# Patient Record
Sex: MALE | Race: WHITE | Employment: OTHER
[De-identification: names, ages, dates, MRNs, and addresses within clinical notes are randomized per-mention and may not be internally consistent; named-entity substitution may affect disease eponyms.]

---

## 2017-01-18 RX ORDER — MELOXICAM 15 MG/1
TABLET ORAL
Qty: 90 TABLET | Refills: 3 | Status: SHIPPED | OUTPATIENT
Start: 2017-01-18 | End: 2018-01-24 | Stop reason: SDUPTHER

## 2017-02-02 ENCOUNTER — SURGERY (OUTPATIENT)
Age: 82
End: 2017-02-02

## 2017-02-02 ENCOUNTER — ANESTHESIA (OUTPATIENT)
Dept: ENDOSCOPY | Age: 82
End: 2017-02-02
Payer: MEDICARE

## 2017-02-02 ENCOUNTER — ANESTHESIA EVENT (OUTPATIENT)
Dept: ENDOSCOPY | Age: 82
End: 2017-02-02
Payer: MEDICARE

## 2017-02-02 VITALS
SYSTOLIC BLOOD PRESSURE: 116 MMHG | DIASTOLIC BLOOD PRESSURE: 67 MMHG | OXYGEN SATURATION: 99 % | RESPIRATION RATE: 18 BRPM

## 2017-02-02 PROCEDURE — 6360000002 HC RX W HCPCS: Performed by: NURSE ANESTHETIST, CERTIFIED REGISTERED

## 2017-02-02 PROCEDURE — 2500000003 HC RX 250 WO HCPCS: Performed by: NURSE ANESTHETIST, CERTIFIED REGISTERED

## 2017-02-02 RX ORDER — PROPOFOL 10 MG/ML
INJECTION, EMULSION INTRAVENOUS PRN
Status: DISCONTINUED | OUTPATIENT
Start: 2017-02-02 | End: 2017-02-02 | Stop reason: SDUPTHER

## 2017-02-02 RX ORDER — LIDOCAINE HYDROCHLORIDE 10 MG/ML
INJECTION, SOLUTION EPIDURAL; INFILTRATION; INTRACAUDAL; PERINEURAL PRN
Status: DISCONTINUED | OUTPATIENT
Start: 2017-02-02 | End: 2017-02-02 | Stop reason: SDUPTHER

## 2017-02-02 RX ADMIN — PROPOFOL 220 MG: 10 INJECTION, EMULSION INTRAVENOUS at 08:18

## 2017-02-02 RX ADMIN — LIDOCAINE HYDROCHLORIDE 5 ML: 10 INJECTION, SOLUTION EPIDURAL; INFILTRATION; INTRACAUDAL; PERINEURAL at 08:18

## 2017-02-20 RX ORDER — SIMVASTATIN 10 MG
TABLET ORAL
Qty: 90 TABLET | Refills: 0 | Status: SHIPPED | OUTPATIENT
Start: 2017-02-20 | End: 2017-05-27 | Stop reason: SDUPTHER

## 2017-03-02 RX ORDER — LISINOPRIL 10 MG/1
TABLET ORAL
Qty: 30 TABLET | Refills: 5 | Status: SHIPPED | OUTPATIENT
Start: 2017-03-02 | End: 2017-08-31 | Stop reason: SDUPTHER

## 2017-04-03 RX ORDER — CARVEDILOL 6.25 MG/1
TABLET ORAL
Qty: 180 TABLET | Refills: 3 | Status: SHIPPED | OUTPATIENT
Start: 2017-04-03 | End: 2018-04-17 | Stop reason: SDUPTHER

## 2017-04-04 ENCOUNTER — OFFICE VISIT (OUTPATIENT)
Dept: FAMILY MEDICINE CLINIC | Age: 82
End: 2017-04-04
Payer: MEDICARE

## 2017-04-04 ENCOUNTER — HOSPITAL ENCOUNTER (OUTPATIENT)
Dept: GENERAL RADIOLOGY | Age: 82
Discharge: HOME | End: 2017-04-04
Payer: MEDICARE

## 2017-04-04 VITALS
WEIGHT: 200 LBS | DIASTOLIC BLOOD PRESSURE: 78 MMHG | SYSTOLIC BLOOD PRESSURE: 140 MMHG | HEART RATE: 86 BPM | BODY MASS INDEX: 25.68 KG/M2 | TEMPERATURE: 98 F | OXYGEN SATURATION: 96 %

## 2017-04-04 DIAGNOSIS — R27.0 ATAXIA: ICD-10-CM

## 2017-04-04 DIAGNOSIS — R90.89 ABNORMAL BRAIN CT: ICD-10-CM

## 2017-04-04 DIAGNOSIS — G31.9 BRAIN ATROPHY (HCC): ICD-10-CM

## 2017-04-04 DIAGNOSIS — Z96.0 HISTORY OF PENILE IMPLANT: ICD-10-CM

## 2017-04-04 DIAGNOSIS — R53.1 WEAKNESS: ICD-10-CM

## 2017-04-04 DIAGNOSIS — G93.89: ICD-10-CM

## 2017-04-04 DIAGNOSIS — F32.89 OTHER DEPRESSION: ICD-10-CM

## 2017-04-04 DIAGNOSIS — I10 ESSENTIAL HYPERTENSION: ICD-10-CM

## 2017-04-04 DIAGNOSIS — E78.2 MIXED HYPERLIPIDEMIA: ICD-10-CM

## 2017-04-04 DIAGNOSIS — J01.20 SUBACUTE ETHMOIDAL SINUSITIS: ICD-10-CM

## 2017-04-04 DIAGNOSIS — N42.9 DISORDER OF PROSTATE: ICD-10-CM

## 2017-04-04 PROCEDURE — 70450 CT HEAD/BRAIN W/O DYE: CPT

## 2017-04-04 PROCEDURE — 93880 EXTRACRANIAL BILAT STUDY: CPT

## 2017-04-04 PROCEDURE — 99214 OFFICE O/P EST MOD 30 MIN: CPT | Performed by: FAMILY MEDICINE

## 2017-04-04 ASSESSMENT — ENCOUNTER SYMPTOMS
CONSTIPATION: 0
ALLERGIC/IMMUNOLOGIC NEGATIVE: 1
BLOOD IN STOOL: 0
VOMITING: 0
EYES NEGATIVE: 1
SHORTNESS OF BREATH: 0
DIARRHEA: 0
CHEST TIGHTNESS: 0
NAUSEA: 0
WHEEZING: 0

## 2017-04-05 ENCOUNTER — TELEPHONE (OUTPATIENT)
Dept: FAMILY MEDICINE CLINIC | Age: 82
End: 2017-04-05

## 2017-04-10 DIAGNOSIS — R26.81 UNSTEADY GAIT: Primary | ICD-10-CM

## 2017-04-20 ENCOUNTER — TELEPHONE (OUTPATIENT)
Dept: FAMILY MEDICINE CLINIC | Age: 82
End: 2017-04-20

## 2017-05-30 RX ORDER — SIMVASTATIN 10 MG
TABLET ORAL
Qty: 90 TABLET | Refills: 3 | Status: SHIPPED | OUTPATIENT
Start: 2017-05-30 | End: 2018-05-30 | Stop reason: SDUPTHER

## 2017-07-05 RX ORDER — SERTRALINE HYDROCHLORIDE 100 MG/1
TABLET, FILM COATED ORAL
Qty: 90 TABLET | Refills: 0 | Status: SHIPPED | OUTPATIENT
Start: 2017-07-05 | End: 2017-10-06 | Stop reason: SDUPTHER

## 2017-07-10 ENCOUNTER — OFFICE VISIT (OUTPATIENT)
Dept: FAMILY MEDICINE CLINIC | Age: 82
End: 2017-07-10
Payer: MEDICARE

## 2017-07-10 VITALS
HEIGHT: 74 IN | SYSTOLIC BLOOD PRESSURE: 138 MMHG | DIASTOLIC BLOOD PRESSURE: 84 MMHG | RESPIRATION RATE: 14 BRPM | WEIGHT: 201 LBS | OXYGEN SATURATION: 98 % | BODY MASS INDEX: 25.8 KG/M2 | TEMPERATURE: 99.1 F | HEART RATE: 62 BPM

## 2017-07-10 DIAGNOSIS — G60.9 IDIOPATHIC PERIPHERAL NEUROPATHY: ICD-10-CM

## 2017-07-10 DIAGNOSIS — I25.10 CORONARY ARTERY DISEASE INVOLVING NATIVE CORONARY ARTERY OF NATIVE HEART WITHOUT ANGINA PECTORIS: ICD-10-CM

## 2017-07-10 DIAGNOSIS — G31.9 BRAIN ATROPHY (HCC): ICD-10-CM

## 2017-07-10 DIAGNOSIS — I10 ESSENTIAL HYPERTENSION: ICD-10-CM

## 2017-07-10 DIAGNOSIS — I48.0 PAROXYSMAL ATRIAL FIBRILLATION (HCC): ICD-10-CM

## 2017-07-10 DIAGNOSIS — R27.0 ATAXIA: ICD-10-CM

## 2017-07-10 DIAGNOSIS — R90.89 ABNORMAL BRAIN CT: ICD-10-CM

## 2017-07-10 DIAGNOSIS — E78.5 HYPERLIPIDEMIA, UNSPECIFIED HYPERLIPIDEMIA TYPE: ICD-10-CM

## 2017-07-10 DIAGNOSIS — G47.10 HYPERSOMNIA: ICD-10-CM

## 2017-07-10 PROCEDURE — 99214 OFFICE O/P EST MOD 30 MIN: CPT | Performed by: FAMILY MEDICINE

## 2017-07-10 ASSESSMENT — ENCOUNTER SYMPTOMS
BLOOD IN STOOL: 0
EYES NEGATIVE: 1
ALLERGIC/IMMUNOLOGIC NEGATIVE: 1
VOMITING: 0
CONSTIPATION: 0
NAUSEA: 0
SHORTNESS OF BREATH: 0
DIARRHEA: 0
WHEEZING: 0
CHEST TIGHTNESS: 0

## 2017-07-11 ENCOUNTER — HOSPITAL ENCOUNTER (OUTPATIENT)
Dept: GENERAL RADIOLOGY | Age: 82
Discharge: HOME | End: 2017-07-11
Payer: MEDICARE

## 2017-07-11 DIAGNOSIS — G31.9 BRAIN ATROPHY (HCC): ICD-10-CM

## 2017-07-11 DIAGNOSIS — R27.0 ATAXIA: ICD-10-CM

## 2017-07-11 DIAGNOSIS — R90.89 ABNORMAL BRAIN CT: ICD-10-CM

## 2017-07-11 DIAGNOSIS — G60.9 IDIOPATHIC PERIPHERAL NEUROPATHY: ICD-10-CM

## 2017-07-11 PROCEDURE — 70450 CT HEAD/BRAIN W/O DYE: CPT

## 2017-07-13 ENCOUNTER — TELEPHONE (OUTPATIENT)
Dept: FAMILY MEDICINE CLINIC | Age: 82
End: 2017-07-13

## 2017-08-07 DIAGNOSIS — R53.1 WEAKNESS: ICD-10-CM

## 2017-09-01 RX ORDER — LISINOPRIL 10 MG/1
TABLET ORAL
Qty: 30 TABLET | Refills: 5 | Status: SHIPPED | OUTPATIENT
Start: 2017-09-01 | End: 2018-03-15 | Stop reason: SDUPTHER

## 2017-10-07 RX ORDER — SERTRALINE HYDROCHLORIDE 100 MG/1
TABLET, FILM COATED ORAL
Qty: 90 TABLET | Refills: 3 | Status: SHIPPED | OUTPATIENT
Start: 2017-10-07 | End: 2018-01-04 | Stop reason: SDUPTHER

## 2018-01-04 RX ORDER — SERTRALINE HYDROCHLORIDE 100 MG/1
TABLET, FILM COATED ORAL
Qty: 90 TABLET | Refills: 3 | Status: SHIPPED | OUTPATIENT
Start: 2018-01-04 | End: 2018-12-20 | Stop reason: SDUPTHER

## 2018-01-24 RX ORDER — MELOXICAM 15 MG/1
15 TABLET ORAL DAILY
Qty: 90 TABLET | Refills: 3 | Status: SHIPPED | OUTPATIENT
Start: 2018-01-24 | End: 2018-12-20 | Stop reason: SDUPTHER

## 2018-02-28 ENCOUNTER — OFFICE VISIT (OUTPATIENT)
Dept: FAMILY MEDICINE CLINIC | Age: 83
End: 2018-02-28
Payer: MEDICARE

## 2018-02-28 VITALS
TEMPERATURE: 97.9 F | DIASTOLIC BLOOD PRESSURE: 72 MMHG | WEIGHT: 202.5 LBS | SYSTOLIC BLOOD PRESSURE: 130 MMHG | BODY MASS INDEX: 26 KG/M2 | OXYGEN SATURATION: 96 % | HEART RATE: 71 BPM

## 2018-02-28 DIAGNOSIS — I10 ESSENTIAL HYPERTENSION: ICD-10-CM

## 2018-02-28 DIAGNOSIS — R26.89 LOSS OF BALANCE: ICD-10-CM

## 2018-02-28 DIAGNOSIS — I48.0 PAROXYSMAL ATRIAL FIBRILLATION (HCC): ICD-10-CM

## 2018-02-28 DIAGNOSIS — I25.10 CORONARY ARTERY DISEASE INVOLVING NATIVE CORONARY ARTERY OF NATIVE HEART WITHOUT ANGINA PECTORIS: ICD-10-CM

## 2018-02-28 DIAGNOSIS — E78.5 HYPERLIPIDEMIA, UNSPECIFIED HYPERLIPIDEMIA TYPE: ICD-10-CM

## 2018-02-28 DIAGNOSIS — R27.0 ATAXIA: ICD-10-CM

## 2018-02-28 PROCEDURE — 99214 OFFICE O/P EST MOD 30 MIN: CPT | Performed by: FAMILY MEDICINE

## 2018-02-28 RX ORDER — MECLIZINE HYDROCHLORIDE 25 MG/1
TABLET ORAL
Qty: 120 TABLET | Refills: 3 | Status: SHIPPED | OUTPATIENT
Start: 2018-02-28 | End: 2019-04-29 | Stop reason: ALTCHOICE

## 2018-02-28 ASSESSMENT — ENCOUNTER SYMPTOMS
BLOOD IN STOOL: 0
COUGH: 0
ALLERGIC/IMMUNOLOGIC NEGATIVE: 1
VOMITING: 0
DIARRHEA: 0
EYES NEGATIVE: 1
CHEST TIGHTNESS: 0
SHORTNESS OF BREATH: 0
NAUSEA: 0
CONSTIPATION: 0
RESPIRATORY NEGATIVE: 1
WHEEZING: 0

## 2018-02-28 NOTE — PROGRESS NOTES
referred to specialty care physician at this time. We therefore going to give him a trial of antevert 25 and have him use one half in the morning one half around noon, one half in the late afternoon and 1 whole pill at bedtime. We will see if this will help his ataxia somewhat. Review of Systems   Constitutional: Positive for fatigue. HENT: Negative. Eyes: Negative. Respiratory: Negative. Negative for cough, chest tightness, shortness of breath and wheezing. Cardiovascular: Negative for chest pain, palpitations and leg swelling. Gastrointestinal: Negative for blood in stool, constipation, diarrhea, nausea and vomiting. Endocrine: Negative. Genitourinary: Negative. Negative for hematuria. Musculoskeletal: Negative. Skin: Negative. Allergic/Immunologic: Negative. Neurological: Positive for weakness. Hematological: Negative. Psychiatric/Behavioral: Negative. Objective:   Physical Exam   Constitutional: He is oriented to person, place, and time. He appears well-developed and well-nourished. HENT:   Head: Normocephalic and atraumatic. Right Ear: External ear normal.   Left Ear: External ear normal.   Nose: Nose normal.   Mouth/Throat: Oropharynx is clear and moist.   Eyes: Conjunctivae and EOM are normal. Pupils are equal, round, and reactive to light. Neck: Normal range of motion. Neck supple. Cardiovascular: Normal rate, regular rhythm, S1 normal, S2 normal, normal heart sounds, intact distal pulses and normal pulses. Pulmonary/Chest: Effort normal and breath sounds normal. No apnea. Abdominal: Soft. Normal appearance. Musculoskeletal: Normal range of motion. Neurological: He is alert and oriented to person, place, and time. He has normal strength and normal reflexes. Skin: Skin is warm, dry and intact. Psychiatric: He has a normal mood and affect.  His speech is normal and behavior is normal. Judgment and thought content normal. Cognition and memory are

## 2018-03-15 RX ORDER — LISINOPRIL 10 MG/1
TABLET ORAL
Qty: 30 TABLET | Refills: 5 | Status: SHIPPED | OUTPATIENT
Start: 2018-03-15 | End: 2018-10-09 | Stop reason: SDUPTHER

## 2018-04-18 RX ORDER — CARVEDILOL 6.25 MG/1
TABLET ORAL
Qty: 180 TABLET | Refills: 3 | Status: SHIPPED | OUTPATIENT
Start: 2018-04-18 | End: 2019-03-18 | Stop reason: SDUPTHER

## 2018-04-18 RX ORDER — CARVEDILOL 6.25 MG/1
TABLET ORAL
Qty: 180 TABLET | Refills: 0 | Status: SHIPPED | OUTPATIENT
Start: 2018-04-18 | End: 2018-04-23 | Stop reason: SDUPTHER

## 2018-04-23 ENCOUNTER — OFFICE VISIT (OUTPATIENT)
Dept: FAMILY MEDICINE CLINIC | Age: 83
End: 2018-04-23
Payer: MEDICARE

## 2018-04-23 ENCOUNTER — HOSPITAL ENCOUNTER (OUTPATIENT)
Dept: GENERAL RADIOLOGY | Age: 83
Discharge: HOME | End: 2018-04-23
Payer: MEDICARE

## 2018-04-23 VITALS
TEMPERATURE: 98.5 F | HEART RATE: 65 BPM | SYSTOLIC BLOOD PRESSURE: 132 MMHG | DIASTOLIC BLOOD PRESSURE: 76 MMHG | HEIGHT: 74 IN | RESPIRATION RATE: 16 BRPM | OXYGEN SATURATION: 97 % | BODY MASS INDEX: 25.8 KG/M2 | WEIGHT: 201 LBS

## 2018-04-23 DIAGNOSIS — R53.1 WEAKNESS: ICD-10-CM

## 2018-04-23 DIAGNOSIS — R41.3 MEMORY LOSS: ICD-10-CM

## 2018-04-23 DIAGNOSIS — R27.0 ATAXIA: ICD-10-CM

## 2018-04-23 DIAGNOSIS — I48.0 PAROXYSMAL ATRIAL FIBRILLATION (HCC): ICD-10-CM

## 2018-04-23 DIAGNOSIS — I25.10 CORONARY ARTERY DISEASE INVOLVING NATIVE CORONARY ARTERY OF NATIVE HEART WITHOUT ANGINA PECTORIS: ICD-10-CM
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
## 2018-04-23 DIAGNOSIS — I10 ESSENTIAL HYPERTENSION: ICD-10-CM

## 2018-04-23 DIAGNOSIS — E78.5 HYPERLIPIDEMIA, UNSPECIFIED HYPERLIPIDEMIA TYPE: ICD-10-CM

## 2018-04-23 DIAGNOSIS — G31.9 BRAIN ATROPHY (HCC): ICD-10-CM

## 2018-04-23 PROCEDURE — 70450 CT HEAD/BRAIN W/O DYE: CPT

## 2018-04-23 PROCEDURE — 99214 OFFICE O/P EST MOD 30 MIN: CPT | Performed by: FAMILY MEDICINE

## 2018-04-23 ASSESSMENT — ENCOUNTER SYMPTOMS
VOMITING: 0
NAUSEA: 0
DIARRHEA: 0
BLOOD IN STOOL: 0
EYES NEGATIVE: 1
CHEST TIGHTNESS: 0
WHEEZING: 0
SHORTNESS OF BREATH: 0
CONSTIPATION: 0
COUGH: 0

## 2018-04-26 ENCOUNTER — OFFICE VISIT (OUTPATIENT)
Dept: NEUROSURGERY | Facility: CLINIC | Age: 83
End: 2018-04-26

## 2018-04-26 VITALS
HEIGHT: 74 IN | BODY MASS INDEX: 25.8 KG/M2 | DIASTOLIC BLOOD PRESSURE: 94 MMHG | WEIGHT: 201 LBS | SYSTOLIC BLOOD PRESSURE: 142 MMHG

## 2018-04-26 DIAGNOSIS — R41.3 MEMORY DEFICIT: ICD-10-CM

## 2018-04-26 DIAGNOSIS — R26.89 BALANCE PROBLEM: Primary | ICD-10-CM

## 2018-04-26 DIAGNOSIS — Z78.9 NONSMOKER: ICD-10-CM

## 2018-04-26 PROCEDURE — 99204 OFFICE O/P NEW MOD 45 MIN: CPT | Performed by: NURSE PRACTITIONER

## 2018-04-26 RX ORDER — NITROGLYCERIN 0.4 MG/1
0.4 TABLET SUBLINGUAL
COMMUNITY
Start: 2018-03-12 | End: 2019-08-27 | Stop reason: SDUPTHER

## 2018-04-26 NOTE — PROGRESS NOTES
"Neurosurgery Initial Patient Visit    Patient: Ayan Pichardo  : 1932    Primary Care Provider: Carlyle Coker MD    Requesting Provider:  Carlyle Coker MD      History    Chief Complaint:   Chief Complaint   Patient presents with   • Balance Issues     Pt states that \"he just knows he has hydrocephalitis\".  The patient states that he has issues with balance       History of Present Illness: He is an 85-year-old  male who presents with chief complaint of balance, neck pain and popping with movement.  He later states he is also having issues with his memory.  He has been referred by Dr. Coker, and we have been asked to see him in consultation for further evaluation of possible hydrocephalus.    The patient and his wife give history that he has actually been having some issues with his memory that have been noticeable, for about the past year.  For the last 4-6 weeks he has had terrible problems with his bowel and and has had several falls as a result.  It is difficult for him to describe how he feels when he loses his balance.  He does not particularly feel that he is not lightheaded or actually dizzy.  He does not relate any associated headache.  He is not describing any joint pain or radicular pain into the lower extremities, particularly no numbness.  He has been using a cane to help him feel more stable.  He has had a recent CT of the head done on the .  We also had imaging available from [facility] with additional CTs done in April and 2017.  While there is some slight ventriculomegaly, this appears to be stable and perhaps is seen in the setting of cerebral atrophy.  No other obvious acute neurologic findings have been seen.  The patient has been doing extensive research and is quite certain that he has hydrocephalus which his sister also hands.  She is now in her 90s and he describes that she is treated with repeated lumbar punctures for CSF removal.    Again, he has noted no " headache but is aware that he is having issues with his memory.  He has difficulty with short-term memory and daily activities.  He has to rely on his wife for assistance in remembering much of anything.  He has difficulty with his balance, which affects his gait and ambulation, but it does not appear to be broad-based.  He does not relate any difficulty with urinary control or incontinence.    Allergies:   Review of patient's allergies indicates no known allergies.    Past Medical History:    Past Medical History:   Diagnosis Date   • Atrial fibrillation    • Back pain    • Brain atrophy    • CAD (coronary artery disease)    • Constipation    • Hyperlipidemia    • Hypersomnia    • Hypertension    • Memory deficit    • Neuropathy    • Radiculopathy        Past Surgical History:   Past Surgical History:   Procedure Laterality Date   • AV NODE ABLATION     • PENILE PROSTHESIS IMPLANT         Medications:    Current Outpatient Prescriptions on File Prior to Visit   Medication Sig Dispense Refill   • aspirin 81 MG EC tablet Take 81 mg by mouth Daily.     • carvedilol (COREG) 6.25 MG tablet Take 6.25 mg by mouth 2 (Two) Times a Day With Meals.     • lisinopril (PRINIVIL,ZESTRIL) 10 MG tablet Take 10 mg by mouth Daily.     • meloxicam (MOBIC) 15 MG tablet Take 15 mg by mouth Daily.     • sertraline (ZOLOFT) 100 MG tablet Take 100 mg by mouth Daily.     • simvastatin (ZOCOR) 10 MG tablet Take 10 mg by mouth Every Night.     • [DISCONTINUED] dutasteride-tamsulosin (NADIR) 0.5-0.4 MG capsule capsule Take  by mouth Daily.     • [DISCONTINUED] linaclotide (LINZESS) 290 MCG capsule capsule Take 290 mcg by mouth Every Morning Before Breakfast.       No current facility-administered medications on file prior to visit.         Social History:   reports that he has never smoked. He has never used smokeless tobacco. He reports that he does not drink alcohol or use drugs.  He is  and his wife accompanies him.  They live here  in [place]   He is retired as a  with the [employer].    Family History:    Family History   Problem Relation Age of Onset   • Cancer Mother    • Hypertension Mother        Review of Systems:  Review of Systems   Constitutional: Positive for fatigue. Negative for chills, fever and unexpected weight change.   HENT: Positive for tinnitus. Negative for congestion, hearing loss, rhinorrhea and sore throat.    Eyes: Negative for photophobia and visual disturbance.   Respiratory: Negative for cough, shortness of breath and wheezing.    Cardiovascular: Negative for chest pain and palpitations.   Gastrointestinal: Negative for constipation, diarrhea, nausea and vomiting.   Genitourinary: Negative for difficulty urinating.   Musculoskeletal: Positive for gait problem, neck pain and neck stiffness. Negative for arthralgias and back pain.   Skin: Negative for rash.   Allergic/Immunologic: Negative for environmental allergies.   Neurological: Positive for dizziness. Negative for seizures, numbness and headaches.   Hematological: Does not bruise/bleed easily.   Psychiatric/Behavioral: Negative for confusion. The patient is not nervous/anxious.    All other systems reviewed and are negative.          Neurological Physical Examination    Physical Exam   Constitutional: He is oriented to person, place, and time. He appears well-developed and well-nourished. No distress.   He is pleasant and cooperative in no acute distress.  He exhibits short-term memory deficit and asks questions repeatedly.   HENT:   Head: Normocephalic and atraumatic.   Eyes: No scleral icterus.   Neck: Neck supple. No tracheal deviation and normal range of motion present.   Cardiovascular: Normal rate, regular rhythm and normal heart sounds.    No murmur heard.  Pulmonary/Chest: Effort normal and breath sounds normal. No respiratory distress. He has no wheezes.   Respirations even and unlabored with no distress.   Musculoskeletal:   There is good  symmetric strength of both upper and lower extremities.  I do not detect a true focal weakness.  He ambulates with steady gait, unassisted for short distances.  His gait does not appear broad-based, nor is it shuffling.   Neurological: He is alert and oriented to person, place, and time. He displays normal reflexes. No cranial nerve deficit.   Optic discs not visualized.  Cranial nerves II through XII appear intact without obvious, acute neurologic deficit.  He is able to obey and follow commands very well without particular difficulty.   Skin: Skin is warm and dry. No rash noted.   Psychiatric: He has a normal mood and affect. His speech is normal and behavior is normal. Cognition and memory are normal.   Vitals reviewed.         Medical Decision Making    Management Options:  In the office we have discussed his care.  I have reviewed multiple CT images on disc from [facility].  It is difficult for me to determine that there has been any significant change in ventricular size over the past year.  I have discussed this with them.    The patient and his wife are well known to our practice.  I spent quite a bit of time with them in the past, and she has been a patient previously.  In many ways they are much as I remember them, although I do note a particular difference in the patient's demeanor and obviously he is having significant difficulties with memory.  His overall presentation to me is not clearly that of hydrocephalus.  I believe there is at least an underlying issue of dementia.  I believe he has been prescribed meclizine but has not yet been able to get the medication and begin using it.  We have talked about physical therapy to evaluate and assist with strengthening, gait and ambulation, and he is very agreeable.  We have also talked about referral to neurology, and they are also agreeable.  Would like to see how he responds to therapy, hoping that this will help to improve his balance and gait.  We would then  see him back with Dr. Rodriguez in about a month.  Hopefully we will also have neurology's thoughts and recommendations.  If he does not improve, then perhaps consider large volume CSF removal and possible MRI of the brain for further evaluation.  Again, they are agreeable with our plan of care.    His imaging is here in the office on file for further review as needed.    Ayan was seen today for balance issues.    Diagnoses and all orders for this visit:    Balance problem  -     Ambulatory Referral to Neurology  -     Ambulatory Referral to Physical Therapy Evaluate and treat (3x a week for 4 weeks), Neuro    Memory deficit  -     Ambulatory Referral to Neurology  -     Ambulatory Referral to Physical Therapy Evaluate and treat (3x a week for 4 weeks), Neuro    BMI 25.0-25.9,adult    Nonsmoker        Thank you, Dr. Coker, for this Consultation and the opportunity to participate in the care of this pleasant patient.

## 2018-05-03 ENCOUNTER — TELEPHONE (OUTPATIENT)
Dept: FAMILY MEDICINE CLINIC | Age: 83
End: 2018-05-03

## 2018-05-09 ENCOUNTER — APPOINTMENT (OUTPATIENT)
Dept: PHYSICAL THERAPY | Facility: HOSPITAL | Age: 83
End: 2018-05-09

## 2018-05-30 RX ORDER — SIMVASTATIN 10 MG
TABLET ORAL
Qty: 90 TABLET | Refills: 1 | Status: SHIPPED | OUTPATIENT
Start: 2018-05-30 | End: 2018-10-09 | Stop reason: SDUPTHER

## 2018-08-10 ENCOUNTER — APPOINTMENT (OUTPATIENT)
Dept: GENERAL RADIOLOGY | Facility: HOSPITAL | Age: 83
End: 2018-08-10

## 2018-08-10 ENCOUNTER — HOSPITAL ENCOUNTER (EMERGENCY)
Facility: HOSPITAL | Age: 83
Discharge: HOME | End: 2018-08-10
Attending: EMERGENCY MEDICINE | Admitting: EMERGENCY MEDICINE

## 2018-08-10 VITALS
RESPIRATION RATE: 16 BRPM | BODY MASS INDEX: 23.1 KG/M2 | SYSTOLIC BLOOD PRESSURE: 160 MMHG | TEMPERATURE: 98 F | HEIGHT: 74 IN | WEIGHT: 180 LBS | DIASTOLIC BLOOD PRESSURE: 90 MMHG | OXYGEN SATURATION: 97 % | HEART RATE: 86 BPM

## 2018-08-10 DIAGNOSIS — T75.1XXA NEAR DROWNING, INITIAL ENCOUNTER: Primary | ICD-10-CM

## 2018-08-10 PROCEDURE — 93005 ELECTROCARDIOGRAM TRACING: CPT

## 2018-08-10 PROCEDURE — 93005 ELECTROCARDIOGRAM TRACING: CPT | Performed by: EMERGENCY MEDICINE

## 2018-08-10 PROCEDURE — 71046 X-RAY EXAM CHEST 2 VIEWS: CPT

## 2018-08-10 PROCEDURE — 99284 EMERGENCY DEPT VISIT MOD MDM: CPT

## 2018-08-10 PROCEDURE — 93010 ELECTROCARDIOGRAM REPORT: CPT | Performed by: INTERNAL MEDICINE
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
## 2018-08-22 ENCOUNTER — OFFICE VISIT (OUTPATIENT)
Dept: OTOLARYNGOLOGY | Facility: CLINIC | Age: 83
End: 2018-08-22

## 2018-08-22 VITALS
DIASTOLIC BLOOD PRESSURE: 80 MMHG | TEMPERATURE: 98 F | SYSTOLIC BLOOD PRESSURE: 141 MMHG | WEIGHT: 189 LBS | HEART RATE: 83 BPM | BODY MASS INDEX: 24.26 KG/M2 | HEIGHT: 74 IN

## 2018-08-22 DIAGNOSIS — C43.9 RECURRENT MALIGNANT MELANOMA OF SKIN (HCC): Primary | ICD-10-CM

## 2018-08-22 PROCEDURE — 99203 OFFICE O/P NEW LOW 30 MIN: CPT | Performed by: OTOLARYNGOLOGY

## 2018-08-22 RX ORDER — LIDOCAINE AND PRILOCAINE 25; 25 MG/G; MG/G
CREAM TOPICAL
Qty: 1 EACH | Refills: 0 | Status: SHIPPED | OUTPATIENT
Start: 2018-08-22 | End: 2018-09-05

## 2018-08-22 RX ORDER — CLOPIDOGREL BISULFATE 75 MG
TABLET ORAL
COMMUNITY
End: 2018-09-05

## 2018-08-22 NOTE — PROGRESS NOTES
CC:   Chief Complaint   Patient presents with   • Skin Lesion     SCALP MELANOMA       HPI: Ayan Pichardo is a 86 y.o. male who reports a skin lesion on the scalp.  This lesion has been present for 6 months.  The lesion has changed. He reports the lesion came up as a knot with a central black spot.  Nothing makes the lesion better or worse.  An excisional biopsy has been performed by Dr. Galarza on 7/16/18 revealing malignant melanoma 2.5 mm in depth with adjacent extensive mature scar. No ulceration with 5/mm2 mitoses.  He had a melanoma removed from this same area 10 years ago in [place] - not sure how it was reconstructed.    Prior history of skin cancer: prior history of malignant melanoma of the scalp removed approximately 10 years ago by Dr. Paula in [place]. His wife is unsure of the pathology.    Dermatologist: Rahat Galarza MD    He is taking ASA and Plavix for history of Afib requiring ablation x 2 by Dr. Alvares at [facility] in [place].     PFSH:  Past Medical History:   Diagnosis Date   • Atrial fibrillation (CMS/HCC)    • Back pain    • Brain atrophy    • CAD (coronary artery disease)    • Constipation    • Hyperlipidemia    • Hypersomnia    • Hypertension    • Melanoma (CMS/HCC)     SCALP   • Memory deficit    • Neuropathy    • Radiculopathy      Past Surgical History:   Procedure Laterality Date   • AV NODE ABLATION     • PENILE PROSTHESIS IMPLANT       Family History   Problem Relation Age of Onset   • Cancer Mother    • Hypertension Mother      Social History   Substance Use Topics   • Smoking status: Never Smoker   • Smokeless tobacco: Never Used   • Alcohol use No     Allergies:  Patient has no known allergies.    Current Outpatient Prescriptions:   •  aspirin 81 MG EC tablet, Take 81 mg by mouth Daily., Disp: , Rfl:   •  carvedilol (COREG) 6.25 MG tablet, Take 6.25 mg by mouth 2 (Two) Times a Day With Meals., Disp: , Rfl:   •  clopidogrel (PLAVIX) 75 MG tablet, Plavix 75 mg tablet  Take 1  "tablet every day by oral route., Disp: , Rfl:   •  lisinopril (PRINIVIL,ZESTRIL) 10 MG tablet, Take 10 mg by mouth Daily., Disp: , Rfl:   •  meloxicam (MOBIC) 15 MG tablet, Take 15 mg by mouth Daily., Disp: , Rfl:   •  nitroglycerin (NITROSTAT) 0.4 MG SL tablet, , Disp: , Rfl:   •  sertraline (ZOLOFT) 100 MG tablet, Take 100 mg by mouth Daily., Disp: , Rfl:   •  simvastatin (ZOCOR) 10 MG tablet, Take 10 mg by mouth Every Night., Disp: , Rfl:     ROS:  Review of Systems   Constitutional: Negative for activity change, appetite change, chills, fatigue, fever and unexpected weight change.   HENT: Negative for congestion, dental problem, facial swelling and nosebleeds.    Eyes: Negative for discharge and redness.   Skin: Negative for color change, pallor and rash.   Hematological: Negative for adenopathy. Does not bruise/bleed easily.       PE:  /80   Pulse 83   Temp 98 °F (36.7 °C)   Ht 188 cm (74\")   Wt 85.7 kg (189 lb)   BMI 24.27 kg/m²   Physical Exam   Constitutional: He is oriented to person, place, and time. He appears well-developed and well-nourished. He is cooperative. No distress.   HENT:   Head: Normocephalic and atraumatic.   Right Ear: External ear normal.   Left Ear: External ear normal.   Nose: Nose normal. No nasal deformity.   Mouth/Throat: Uvula is midline, oropharynx is clear and moist and mucous membranes are normal.   Eyes: Pupils are equal, round, and reactive to light. Conjunctivae, EOM and lids are normal. Right eye exhibits no discharge. Left eye exhibits no discharge. No scleral icterus.   Neck: Normal range of motion and phonation normal. Neck supple. No tracheal deviation present.   Cardiovascular: Normal rate and regular rhythm.    Pulmonary/Chest: Effort normal. No stridor. No respiratory distress.   Musculoskeletal: Normal range of motion. He exhibits no edema or deformity.   Lymphadenopathy:     He has no cervical adenopathy.   Neurological: He is alert and oriented to person, " place, and time. He has normal strength. No cranial nerve deficit. Coordination normal.   Skin: Skin is warm and dry. No rash noted. He is not diaphoretic. No erythema. No pallor.   Left central scalp (just left of midline) with 3 x 2.5 cm granulating wound    Psychiatric: He has a normal mood and affect. His speech is normal and behavior is normal. Judgment and thought content normal. Cognition and memory are normal.   Nursing note and vitals reviewed.          Data review:      Assessment:  1. Recurrent malignant melanoma of skin (CMS/HCC)        Plan:    1.  I have offered and recommended excision of the malignant melanoma of the scalp with FTSG in the operating room with permanent section analysis. I have also recommended sentinel lymph node biopsy with lymphoscintigraphy. Since this lesion is marcela the left paramedian, will follow left-sided drainage for SLNBx.  2. The risks and benefits of my recommendations, as well as other treatment options were discussed with the patient and wife today.   3. Discussion of skin lesion. Discussed risks, benefits, alternatives, and possible complications of excision of the skin lesion with reconstruction utilizing local tissue rearrangement, full-thickness skin grafting, or local interpolated flaps. Risks include, but are not limited too: bleeding, infection, hematoma, recurrence, need for additional procedures, flap failure, cosmetic deformity. Patient understands risks and would like to proceed with surgery.  4. We will contact Dr. Alvares at [facility] regarding clearance to stop ASA and Plavix prior to the procedure.      Return for 1 week postoperatively.      Eric Silva MD   08/22/2018  10:47 AM

## 2018-08-27 PROBLEM — C43.9: Status: ACTIVE | Noted: 2018-08-27

## 2018-09-02 ENCOUNTER — APPOINTMENT (OUTPATIENT)
Dept: GENERAL RADIOLOGY | Facility: HOSPITAL | Age: 83
End: 2018-09-02

## 2018-09-02 ENCOUNTER — HOSPITAL ENCOUNTER (EMERGENCY)
Facility: HOSPITAL | Age: 83
Discharge: HOME | End: 2018-09-02
Admitting: EMERGENCY MEDICINE

## 2018-09-02 VITALS
WEIGHT: 180 LBS | DIASTOLIC BLOOD PRESSURE: 70 MMHG | OXYGEN SATURATION: 98 % | RESPIRATION RATE: 16 BRPM | HEART RATE: 100 BPM | TEMPERATURE: 97.4 F | BODY MASS INDEX: 23.1 KG/M2 | HEIGHT: 74 IN | SYSTOLIC BLOOD PRESSURE: 134 MMHG

## 2018-09-02 DIAGNOSIS — R07.9 CHEST PAIN, UNSPECIFIED TYPE: Primary | ICD-10-CM

## 2018-09-02 LAB
ALBUMIN SERPL-MCNC: 3.8 G/DL (ref 3.5–5)
ALBUMIN/GLOB SERPL: 1.4 G/DL (ref 1.1–2.5)
ALP SERPL-CCNC: 74 U/L (ref 24–120)
ALT SERPL W P-5'-P-CCNC: 30 U/L (ref 0–54)
ANION GAP SERPL CALCULATED.3IONS-SCNC: 7 MMOL/L (ref 4–13)
APTT PPP: 36 SECONDS (ref 24.1–34.8)
AST SERPL-CCNC: 31 U/L (ref 7–45)
BASOPHILS # BLD AUTO: 0.03 10*3/MM3 (ref 0–0.2)
BASOPHILS NFR BLD AUTO: 0.5 % (ref 0–2)
BILIRUB SERPL-MCNC: 0.6 MG/DL (ref 0.1–1)
BUN BLD-MCNC: 23 MG/DL (ref 5–21)
BUN/CREAT SERPL: 24 (ref 7–25)
CALCIUM SPEC-SCNC: 9.8 MG/DL (ref 8.4–10.4)
CHLORIDE SERPL-SCNC: 103 MMOL/L (ref 98–110)
CO2 SERPL-SCNC: 31 MMOL/L (ref 24–31)
CREAT BLD-MCNC: 0.96 MG/DL (ref 0.5–1.4)
DEPRECATED RDW RBC AUTO: 43.8 FL (ref 40–54)
EOSINOPHIL # BLD AUTO: 0.17 10*3/MM3 (ref 0–0.7)
EOSINOPHIL NFR BLD AUTO: 2.9 % (ref 0–4)
ERYTHROCYTE [DISTWIDTH] IN BLOOD BY AUTOMATED COUNT: 12.2 % (ref 12–15)
GFR SERPL CREATININE-BSD FRML MDRD: 74 ML/MIN/1.73
GLOBULIN UR ELPH-MCNC: 2.7 GM/DL
GLUCOSE BLD-MCNC: 95 MG/DL (ref 70–100)
HCT VFR BLD AUTO: 40.7 % (ref 40–52)
HGB BLD-MCNC: 13.8 G/DL (ref 14–18)
IMM GRANULOCYTES # BLD: 0.02 10*3/MM3 (ref 0–0.03)
IMM GRANULOCYTES NFR BLD: 0.3 % (ref 0–5)
INR PPP: 1.03 (ref 0.91–1.09)
LIPASE SERPL-CCNC: 87 U/L (ref 23–203)
LYMPHOCYTES # BLD AUTO: 1.72 10*3/MM3 (ref 0.72–4.86)
LYMPHOCYTES NFR BLD AUTO: 28.9 % (ref 15–45)
MCH RBC QN AUTO: 32.9 PG (ref 28–32)
MCHC RBC AUTO-ENTMCNC: 33.9 G/DL (ref 33–36)
MCV RBC AUTO: 96.9 FL (ref 82–95)
MONOCYTES # BLD AUTO: 0.48 10*3/MM3 (ref 0.19–1.3)
MONOCYTES NFR BLD AUTO: 8.1 % (ref 4–12)
NEUTROPHILS # BLD AUTO: 3.54 10*3/MM3 (ref 1.87–8.4)
NEUTROPHILS NFR BLD AUTO: 59.3 % (ref 39–78)
NRBC BLD MANUAL-RTO: 0 /100 WBC (ref 0–0)
NT-PROBNP SERPL-MCNC: 326 PG/ML (ref 0–1800)
PLATELET # BLD AUTO: 125 10*3/MM3 (ref 130–400)
PMV BLD AUTO: 10.1 FL (ref 6–12)
POTASSIUM BLD-SCNC: 5.2 MMOL/L (ref 3.5–5.3)
PROT SERPL-MCNC: 6.5 G/DL (ref 6.3–8.7)
PROTHROMBIN TIME: 13.8 SECONDS (ref 11.9–14.6)
RBC # BLD AUTO: 4.2 10*6/MM3 (ref 4.8–5.9)
SODIUM BLD-SCNC: 141 MMOL/L (ref 135–145)
TROPONIN I SERPL-MCNC: <0.012 NG/ML (ref 0–0.03)
TROPONIN I SERPL-MCNC: <0.012 NG/ML (ref 0–0.03)
WBC NRBC COR # BLD: 5.96 10*3/MM3 (ref 4.8–10.8)

## 2018-09-02 PROCEDURE — 36415 COLL VENOUS BLD VENIPUNCTURE: CPT

## 2018-09-02 PROCEDURE — 85025 COMPLETE CBC W/AUTO DIFF WBC: CPT | Performed by: NURSE PRACTITIONER

## 2018-09-02 PROCEDURE — 99285 EMERGENCY DEPT VISIT HI MDM: CPT

## 2018-09-02 PROCEDURE — 85730 THROMBOPLASTIN TIME PARTIAL: CPT | Performed by: NURSE PRACTITIONER

## 2018-09-02 PROCEDURE — 83880 ASSAY OF NATRIURETIC PEPTIDE: CPT | Performed by: NURSE PRACTITIONER

## 2018-09-02 PROCEDURE — 93005 ELECTROCARDIOGRAM TRACING: CPT | Performed by: NURSE PRACTITIONER

## 2018-09-02 PROCEDURE — 85610 PROTHROMBIN TIME: CPT | Performed by: NURSE PRACTITIONER

## 2018-09-02 PROCEDURE — 83690 ASSAY OF LIPASE: CPT | Performed by: NURSE PRACTITIONER

## 2018-09-02 PROCEDURE — 71045 X-RAY EXAM CHEST 1 VIEW: CPT

## 2018-09-02 PROCEDURE — 93010 ELECTROCARDIOGRAM REPORT: CPT | Performed by: INTERNAL MEDICINE

## 2018-09-02 PROCEDURE — 80053 COMPREHEN METABOLIC PANEL: CPT | Performed by: NURSE PRACTITIONER

## 2018-09-02 PROCEDURE — 93005 ELECTROCARDIOGRAM TRACING: CPT

## 2018-09-02 PROCEDURE — 84484 ASSAY OF TROPONIN QUANT: CPT | Performed by: NURSE PRACTITIONER

## 2018-09-02 RX ORDER — SODIUM CHLORIDE 0.9 % (FLUSH) 0.9 %
10 SYRINGE (ML) INJECTION AS NEEDED
Status: DISCONTINUED | OUTPATIENT
Start: 2018-09-02 | End: 2018-09-02 | Stop reason: HOSPADM

## 2018-09-02 NOTE — DISCHARGE INSTRUCTIONS
Please follow up with Dr. Alvares on Tuesday  Return to the ER as needed          Nonspecific Chest Pain  Chest pain can be caused by many different conditions. There is always a chance that your pain could be related to something serious, such as a heart attack or a blood clot in your lungs. Chest pain can also be caused by conditions that are not life-threatening. If you have chest pain, it is very important to follow up with your health care provider.  What are the causes?  Causes of this condition include:  · Heartburn.  · Pneumonia or bronchitis.  · Anxiety or stress.  · Inflammation around your heart (pericarditis) or lung (pleuritis or pleurisy).  · A blood clot in your lung.  · A collapsed lung (pneumothorax). This can develop suddenly on its own (spontaneous pneumothorax) or from trauma to the chest.  · Shingles infection (varicella-zoster virus).  · Heart attack.  · Damage to the bones, muscles, and cartilage that make up your chest wall. This can include:  ? Bruised bones due to injury.  ? Strained muscles or cartilage due to frequent or repeated coughing or overwork.  ? Fracture to one or more ribs.  ? Sore cartilage due to inflammation (costochondritis).    What increases the risk?  Risk factors for this condition may include:  · Activities that increase your risk for trauma or injury to your chest.  · Respiratory infections or conditions that cause frequent coughing.  · Medical conditions or overeating that can cause heartburn.  · Heart disease or family history of heart disease.  · Conditions or health behaviors that increase your risk of developing a blood clot.  · Having had chicken pox (varicella zoster).    What are the signs or symptoms?  Chest pain can feel like:  · Burning or tingling on the surface of your chest or deep in your chest.  · Crushing, pressure, aching, or squeezing pain.  · Dull or sharp pain that is worse when you move, cough, or take a deep breath.  · Pain that is also felt in  your back, neck, shoulder, or arm, or pain that spreads to any of these areas.    Your chest pain may come and go, or it may stay constant.  How is this diagnosed?  Lab tests or other studies may be needed to find the cause of your pain. Your health care provider may have you take a test called an ECG (electrocardiogram). An ECG records your heartbeat patterns at the time the test is performed. You may also have other tests, such as:  · Transthoracic echocardiogram (TTE). In this test, sound waves are used to create a picture of the heart structures and to look at how blood flows through your heart.  · Transesophageal echocardiogram (JAYLYN). This is a more advanced imaging test that takes images from inside your body. It allows your health care provider to see your heart in finer detail.  · Cardiac monitoring. This allows your health care provider to monitor your heart rate and rhythm in real time.  · Holter monitor. This is a portable device that records your heartbeat and can help to diagnose abnormal heartbeats. It allows your health care provider to track your heart activity for several days, if needed.  · Stress tests. These can be done through exercise or by taking medicine that makes your heart beat more quickly.  · Blood tests.  · Other imaging tests.    How is this treated?  Treatment depends on what is causing your chest pain. Treatment may include:  · Medicines. These may include:  ? Acid blockers for heartburn.  ? Anti-inflammatory medicine.  ? Pain medicine for inflammatory conditions.  ? Antibiotic medicine, if an infection is present.  ? Medicines to dissolve blood clots.  ? Medicines to treat coronary artery disease (CAD).  · Supportive care for conditions that do not require medicines. This may include:  ? Resting.  ? Applying heat or cold packs to injured areas.  ? Limiting activities until pain decreases.    Follow these instructions at home:  Medicines  · If you were prescribed an antibiotic, take  it as told by your health care provider. Do not stop taking the antibiotic even if you start to feel better.  · Take over-the-counter and prescription medicines only as told by your health care provider.  Lifestyle  · Do not use any products that contain nicotine or tobacco, such as cigarettes and e-cigarettes. If you need help quitting, ask your health care provider.  · Do not drink alcohol.  · Make lifestyle changes as directed by your health care provider. These may include:  ? Getting regular exercise. Ask your health care provider to suggest some activities that are safe for you.  ? Eating a heart-healthy diet. A registered dietitian can help you to learn healthy eating options.  ? Maintaining a healthy weight.  ? Managing diabetes, if necessary.  ? Reducing stress, such as with yoga or relaxation techniques.  General instructions  · Avoid any activities that bring on chest pain.  · If heartburn is the cause for your chest pain, raise (elevate) the head of your bed about 6 inches (15 cm) by putting blocks under the legs. Sleeping with more pillows does not effectively relieve heartburn because it only changes the position of your head.  · Keep all follow-up visits as told by your health care provider. This is important. This includes any further testing if your chest pain does not go away.  Contact a health care provider if:  · Your chest pain does not go away.  · You have a rash with blisters on your chest.  · You have a fever.  · You have chills.  Get help right away if:  · Your chest pain is worse.  · You have a cough that gets worse, or you cough up blood.  · You have severe pain in your abdomen.  · You have severe weakness.  · You faint.  · You have sudden, unexplained chest discomfort.  · You have sudden, unexplained discomfort in your arms, back, neck, or jaw.  · You have shortness of breath at any time.  · You suddenly start to sweat, or your skin gets clammy.  · You feel nauseous or you vomit.  · You  suddenly feel light-headed or dizzy.  · Your heart begins to beat quickly, or it feels like it is skipping beats.  These symptoms may represent a serious problem that is an emergency. Do not wait to see if the symptoms will go away. Get medical help right away. Call your local emergency services (911 in the [place] Do not drive yourself to the hospital.  This information is not intended to replace advice given to you by your health care provider. Make sure you discuss any questions you have with your health care provider.  Document Released: 09/27/2006 Document Revised: 09/11/2017 Document Reviewed: 09/11/2017  [employer] Interactive Patient Education © 2017 [employer]
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
## 2018-09-05 ENCOUNTER — APPOINTMENT (OUTPATIENT)
Dept: PREADMISSION TESTING | Facility: HOSPITAL | Age: 83
End: 2018-09-05

## 2018-09-05 VITALS
SYSTOLIC BLOOD PRESSURE: 156 MMHG | WEIGHT: 192.24 LBS | RESPIRATION RATE: 18 BRPM | HEART RATE: 75 BPM | HEIGHT: 72 IN | DIASTOLIC BLOOD PRESSURE: 102 MMHG | OXYGEN SATURATION: 98 % | BODY MASS INDEX: 26.04 KG/M2

## 2018-09-10 ENCOUNTER — TELEPHONE (OUTPATIENT)
Dept: OTOLARYNGOLOGY | Facility: CLINIC | Age: 83
End: 2018-09-10

## 2018-09-10 RX ORDER — LIDOCAINE AND PRILOCAINE 25; 25 MG/G; MG/G
CREAM TOPICAL ONCE
Status: COMPLETED | OUTPATIENT
Start: 2018-09-11 | End: 2018-09-11

## 2018-09-10 NOTE — ED NOTES
"ED Call Back Questions    1. How are you doing since leaving the Emergency Department?    Doing well, good er visit  2. Do you have any questions about your discharge instructions? No     3. Have you filled your new prescriptions yet? N/A  a. Do you have any questions about those medications? N/A    4. Were you able to make a follow-up appointment with the physician? Yes     5. Do you have a primary care physician? Yes   a. If No, would you like for me to set you up with one? N/A  i. If Yes, “I will have our ED  give you a call right back at this number to work with you on the best time for an appointment.”    6. We are always looking to get better at what we do. Do you have any suggestions for what we can do to be even better? No   a. If Yes, \"Thank you for sharing your concerns. I apologize. I will follow up with our manager and patient . Would you like someone to call you back?\" N/A    7. Is there anything else I can do for you? No     "

## 2018-09-10 NOTE — TELEPHONE ENCOUNTER
Patient holding Aspirin x7 days. No longer takes Plavix. Arrival time given for surgery tomorrow.

## 2018-09-11 ENCOUNTER — ANESTHESIA EVENT (OUTPATIENT)
Dept: PERIOP | Facility: HOSPITAL | Age: 83
End: 2018-09-11

## 2018-09-11 ENCOUNTER — ANESTHESIA (OUTPATIENT)
Dept: PERIOP | Facility: HOSPITAL | Age: 83
End: 2018-09-11

## 2018-09-11 ENCOUNTER — HOSPITAL ENCOUNTER (OUTPATIENT)
Facility: HOSPITAL | Age: 83
Discharge: HOME | End: 2018-09-12
Attending: OTOLARYNGOLOGY | Admitting: RADIOLOGY

## 2018-09-11 ENCOUNTER — HOSPITAL ENCOUNTER (OUTPATIENT)
Dept: NUCLEAR MEDICINE | Facility: HOSPITAL | Age: 83
Discharge: HOME | End: 2018-09-11
Attending: OTOLARYNGOLOGY

## 2018-09-11 DIAGNOSIS — C43.9 RECURRENT MALIGNANT MELANOMA OF SKIN (HCC): ICD-10-CM

## 2018-09-11 PROCEDURE — 63710000001 ATORVASTATIN 10 MG TABLET: Performed by: OTOLARYNGOLOGY

## 2018-09-11 PROCEDURE — 25010000002 SUCCINYLCHOLINE PER 20 MG: Performed by: NURSE ANESTHETIST, CERTIFIED REGISTERED

## 2018-09-11 PROCEDURE — A9541 TC99M SULFUR COLLOID: HCPCS | Performed by: OTOLARYNGOLOGY

## 2018-09-11 PROCEDURE — 38510 BIOPSY/REMOVAL LYMPH NODES: CPT | Performed by: OTOLARYNGOLOGY

## 2018-09-11 PROCEDURE — 25010000002 FENTANYL CITRATE (PF) 100 MCG/2ML SOLUTION: Performed by: NURSE ANESTHETIST, CERTIFIED REGISTERED

## 2018-09-11 PROCEDURE — 63710000001 LISINOPRIL 10 MG TABLET: Performed by: OTOLARYNGOLOGY

## 2018-09-11 PROCEDURE — 94799 UNLISTED PULMONARY SVC/PX: CPT

## 2018-09-11 PROCEDURE — G0378 HOSPITAL OBSERVATION PER HR: HCPCS

## 2018-09-11 PROCEDURE — 63710000001 HYDROCODONE-ACETAMINOPHEN 7.5-325 MG TABLET: Performed by: OTOLARYNGOLOGY

## 2018-09-11 PROCEDURE — 0 TECHNETIUM FILTERED SULFUR COLLOID: Performed by: OTOLARYNGOLOGY

## 2018-09-11 PROCEDURE — A9270 NON-COVERED ITEM OR SERVICE: HCPCS | Performed by: OTOLARYNGOLOGY

## 2018-09-11 PROCEDURE — 25010000002 ONDANSETRON PER 1 MG: Performed by: NURSE ANESTHETIST, CERTIFIED REGISTERED

## 2018-09-11 PROCEDURE — 15220 FTH/GFT FR S/A/L 20 SQ CM/<: CPT | Performed by: OTOLARYNGOLOGY

## 2018-09-11 PROCEDURE — 63710000001 ZOLPIDEM 5 MG TABLET: Performed by: OTOLARYNGOLOGY

## 2018-09-11 PROCEDURE — 25010000002 PROPOFOL 10 MG/ML EMULSION: Performed by: NURSE ANESTHETIST, CERTIFIED REGISTERED

## 2018-09-11 PROCEDURE — 63710000001 CARVEDILOL 6.25 MG TABLET: Performed by: OTOLARYNGOLOGY

## 2018-09-11 PROCEDURE — 25010000003 CEFAZOLIN PER 500 MG: Performed by: OTOLARYNGOLOGY

## 2018-09-11 PROCEDURE — 78195 LYMPH SYSTEM IMAGING: CPT

## 2018-09-11 PROCEDURE — 15221 FTH/GFT FR S/A/L EACH ADDL: CPT | Performed by: OTOLARYNGOLOGY

## 2018-09-11 PROCEDURE — 21014 EXC FACE TUM DEEP 2 CM/>: CPT | Performed by: OTOLARYNGOLOGY

## 2018-09-11 PROCEDURE — 25010000002 DEXAMETHASONE PER 1 MG: Performed by: NURSE ANESTHETIST, CERTIFIED REGISTERED

## 2018-09-11 PROCEDURE — 63710000001 SERTRALINE 100 MG TABLET: Performed by: OTOLARYNGOLOGY

## 2018-09-11 PROCEDURE — 38724 REMOVAL OF LYMPH NODES NECK: CPT | Performed by: OTOLARYNGOLOGY

## 2018-09-11 PROCEDURE — 88305 TISSUE EXAM BY PATHOLOGIST: CPT | Performed by: OTOLARYNGOLOGY

## 2018-09-11 RX ORDER — SERTRALINE HYDROCHLORIDE 100 MG/1
100 TABLET, FILM COATED ORAL DAILY
Status: DISCONTINUED | OUTPATIENT
Start: 2018-09-11 | End: 2018-09-12 | Stop reason: HOSPADM

## 2018-09-11 RX ORDER — OXYCODONE AND ACETAMINOPHEN 7.5; 325 MG/1; MG/1
2 TABLET ORAL ONCE AS NEEDED
Status: DISCONTINUED | OUTPATIENT
Start: 2018-09-11 | End: 2018-09-11 | Stop reason: HOSPADM

## 2018-09-11 RX ORDER — MEPERIDINE HYDROCHLORIDE 25 MG/ML
12.5 INJECTION INTRAMUSCULAR; INTRAVENOUS; SUBCUTANEOUS
Status: DISCONTINUED | OUTPATIENT
Start: 2018-09-11 | End: 2018-09-11 | Stop reason: HOSPADM

## 2018-09-11 RX ORDER — NALOXONE HCL 0.4 MG/ML
0.4 VIAL (ML) INJECTION
Status: DISCONTINUED | OUTPATIENT
Start: 2018-09-11 | End: 2018-09-12 | Stop reason: HOSPADM

## 2018-09-11 RX ORDER — PROPOFOL 10 MG/ML
VIAL (ML) INTRAVENOUS AS NEEDED
Status: DISCONTINUED | OUTPATIENT
Start: 2018-09-11 | End: 2018-09-11 | Stop reason: SURG

## 2018-09-11 RX ORDER — IBUPROFEN 600 MG/1
600 TABLET ORAL ONCE AS NEEDED
Status: DISCONTINUED | OUTPATIENT
Start: 2018-09-11 | End: 2018-09-11 | Stop reason: HOSPADM

## 2018-09-11 RX ORDER — LIDOCAINE HYDROCHLORIDE AND EPINEPHRINE 10; 10 MG/ML; UG/ML
INJECTION, SOLUTION INFILTRATION; PERINEURAL AS NEEDED
Status: DISCONTINUED | OUTPATIENT
Start: 2018-09-11 | End: 2018-09-11 | Stop reason: HOSPADM

## 2018-09-11 RX ORDER — HYDROCODONE BITARTRATE AND ACETAMINOPHEN 7.5; 325 MG/1; MG/1
1 TABLET ORAL EVERY 4 HOURS PRN
Status: DISCONTINUED | OUTPATIENT
Start: 2018-09-11 | End: 2018-09-12 | Stop reason: HOSPADM

## 2018-09-11 RX ORDER — OXYCODONE AND ACETAMINOPHEN 10; 325 MG/1; MG/1
1 TABLET ORAL ONCE AS NEEDED
Status: DISCONTINUED | OUTPATIENT
Start: 2018-09-11 | End: 2018-09-11 | Stop reason: HOSPADM

## 2018-09-11 RX ORDER — SODIUM CHLORIDE 0.9 % (FLUSH) 0.9 %
1-10 SYRINGE (ML) INJECTION AS NEEDED
Status: DISCONTINUED | OUTPATIENT
Start: 2018-09-11 | End: 2018-09-12 | Stop reason: HOSPADM

## 2018-09-11 RX ORDER — ACETAMINOPHEN 500 MG
1000 TABLET ORAL ONCE
Status: COMPLETED | OUTPATIENT
Start: 2018-09-11 | End: 2018-09-11

## 2018-09-11 RX ORDER — IPRATROPIUM BROMIDE AND ALBUTEROL SULFATE 2.5; .5 MG/3ML; MG/3ML
3 SOLUTION RESPIRATORY (INHALATION) ONCE AS NEEDED
Status: DISCONTINUED | OUTPATIENT
Start: 2018-09-11 | End: 2018-09-11 | Stop reason: HOSPADM

## 2018-09-11 RX ORDER — FENTANYL CITRATE 50 UG/ML
25 INJECTION, SOLUTION INTRAMUSCULAR; INTRAVENOUS AS NEEDED
Status: DISCONTINUED | OUTPATIENT
Start: 2018-09-11 | End: 2018-09-11 | Stop reason: HOSPADM

## 2018-09-11 RX ORDER — SODIUM CHLORIDE, SODIUM LACTATE, POTASSIUM CHLORIDE, CALCIUM CHLORIDE 600; 310; 30; 20 MG/100ML; MG/100ML; MG/100ML; MG/100ML
1000 INJECTION, SOLUTION INTRAVENOUS CONTINUOUS
Status: DISCONTINUED | OUTPATIENT
Start: 2018-09-11 | End: 2018-09-11

## 2018-09-11 RX ORDER — LABETALOL HYDROCHLORIDE 5 MG/ML
5 INJECTION, SOLUTION INTRAVENOUS
Status: DISCONTINUED | OUTPATIENT
Start: 2018-09-11 | End: 2018-09-11 | Stop reason: HOSPADM

## 2018-09-11 RX ORDER — ONDANSETRON 2 MG/ML
4 INJECTION INTRAMUSCULAR; INTRAVENOUS ONCE AS NEEDED
Status: DISCONTINUED | OUTPATIENT
Start: 2018-09-11 | End: 2018-09-11 | Stop reason: HOSPADM

## 2018-09-11 RX ORDER — ROCURONIUM BROMIDE 10 MG/ML
INJECTION, SOLUTION INTRAVENOUS AS NEEDED
Status: DISCONTINUED | OUTPATIENT
Start: 2018-09-11 | End: 2018-09-11 | Stop reason: SURG

## 2018-09-11 RX ORDER — PROMETHAZINE HYDROCHLORIDE 25 MG/ML
12.5 INJECTION, SOLUTION INTRAMUSCULAR; INTRAVENOUS EVERY 6 HOURS PRN
Status: DISCONTINUED | OUTPATIENT
Start: 2018-09-11 | End: 2018-09-12 | Stop reason: HOSPADM

## 2018-09-11 RX ORDER — METOCLOPRAMIDE HYDROCHLORIDE 5 MG/ML
5 INJECTION INTRAMUSCULAR; INTRAVENOUS
Status: DISCONTINUED | OUTPATIENT
Start: 2018-09-11 | End: 2018-09-11 | Stop reason: HOSPADM

## 2018-09-11 RX ORDER — FENTANYL CITRATE 50 UG/ML
INJECTION, SOLUTION INTRAMUSCULAR; INTRAVENOUS AS NEEDED
Status: DISCONTINUED | OUTPATIENT
Start: 2018-09-11 | End: 2018-09-11 | Stop reason: SURG

## 2018-09-11 RX ORDER — SODIUM CHLORIDE, SODIUM LACTATE, POTASSIUM CHLORIDE, CALCIUM CHLORIDE 600; 310; 30; 20 MG/100ML; MG/100ML; MG/100ML; MG/100ML
100 INJECTION, SOLUTION INTRAVENOUS CONTINUOUS
Status: DISCONTINUED | OUTPATIENT
Start: 2018-09-11 | End: 2018-09-12 | Stop reason: HOSPADM

## 2018-09-11 RX ORDER — NALOXONE HCL 0.4 MG/ML
0.4 VIAL (ML) INJECTION AS NEEDED
Status: DISCONTINUED | OUTPATIENT
Start: 2018-09-11 | End: 2018-09-11 | Stop reason: HOSPADM

## 2018-09-11 RX ORDER — LIDOCAINE HYDROCHLORIDE 20 MG/ML
INJECTION, SOLUTION INFILTRATION; PERINEURAL AS NEEDED
Status: DISCONTINUED | OUTPATIENT
Start: 2018-09-11 | End: 2018-09-11 | Stop reason: SURG

## 2018-09-11 RX ORDER — MAGNESIUM HYDROXIDE 1200 MG/15ML
LIQUID ORAL AS NEEDED
Status: DISCONTINUED | OUTPATIENT
Start: 2018-09-11 | End: 2018-09-11 | Stop reason: HOSPADM

## 2018-09-11 RX ORDER — CARVEDILOL 6.25 MG/1
6.25 TABLET ORAL 2 TIMES DAILY WITH MEALS
Status: DISCONTINUED | OUTPATIENT
Start: 2018-09-11 | End: 2018-09-12 | Stop reason: HOSPADM

## 2018-09-11 RX ORDER — MORPHINE SULFATE 10 MG/ML
6 INJECTION INTRAMUSCULAR; INTRAVENOUS; SUBCUTANEOUS
Status: DISCONTINUED | OUTPATIENT
Start: 2018-09-11 | End: 2018-09-12 | Stop reason: HOSPADM

## 2018-09-11 RX ORDER — DEXAMETHASONE SODIUM PHOSPHATE 4 MG/ML
INJECTION, SOLUTION INTRA-ARTICULAR; INTRALESIONAL; INTRAMUSCULAR; INTRAVENOUS; SOFT TISSUE AS NEEDED
Status: DISCONTINUED | OUTPATIENT
Start: 2018-09-11 | End: 2018-09-11 | Stop reason: SURG

## 2018-09-11 RX ORDER — ATORVASTATIN CALCIUM 10 MG/1
10 TABLET, FILM COATED ORAL NIGHTLY
Status: DISCONTINUED | OUTPATIENT
Start: 2018-09-11 | End: 2018-09-12 | Stop reason: HOSPADM

## 2018-09-11 RX ORDER — SODIUM CHLORIDE 0.9 % (FLUSH) 0.9 %
3 SYRINGE (ML) INJECTION AS NEEDED
Status: DISCONTINUED | OUTPATIENT
Start: 2018-09-11 | End: 2018-09-12 | Stop reason: HOSPADM

## 2018-09-11 RX ORDER — PROMETHAZINE HYDROCHLORIDE 25 MG/1
6.25 TABLET ORAL EVERY 4 HOURS PRN
Status: DISCONTINUED | OUTPATIENT
Start: 2018-09-11 | End: 2018-09-12 | Stop reason: HOSPADM

## 2018-09-11 RX ORDER — ZOLPIDEM TARTRATE 5 MG/1
5 TABLET ORAL NIGHTLY PRN
Status: DISCONTINUED | OUTPATIENT
Start: 2018-09-11 | End: 2018-09-12 | Stop reason: HOSPADM

## 2018-09-11 RX ORDER — ONDANSETRON 2 MG/ML
INJECTION INTRAMUSCULAR; INTRAVENOUS AS NEEDED
Status: DISCONTINUED | OUTPATIENT
Start: 2018-09-11 | End: 2018-09-11 | Stop reason: SURG

## 2018-09-11 RX ORDER — PROMETHAZINE HYDROCHLORIDE 12.5 MG/1
6.25 SUPPOSITORY RECTAL EVERY 4 HOURS PRN
Status: DISCONTINUED | OUTPATIENT
Start: 2018-09-11 | End: 2018-09-12 | Stop reason: HOSPADM

## 2018-09-11 RX ORDER — LISINOPRIL 10 MG/1
10 TABLET ORAL DAILY
Status: DISCONTINUED | OUTPATIENT
Start: 2018-09-11 | End: 2018-09-12 | Stop reason: HOSPADM

## 2018-09-11 RX ORDER — SUCCINYLCHOLINE CHLORIDE 20 MG/ML
INJECTION INTRAMUSCULAR; INTRAVENOUS AS NEEDED
Status: DISCONTINUED | OUTPATIENT
Start: 2018-09-11 | End: 2018-09-11 | Stop reason: SURG

## 2018-09-11 RX ADMIN — LISINOPRIL 10 MG: 10 TABLET ORAL at 16:32

## 2018-09-11 RX ADMIN — CARVEDILOL 6.25 MG: 6.25 TABLET, FILM COATED ORAL at 17:44

## 2018-09-11 RX ADMIN — CEFAZOLIN 2 G: 330 INJECTION, POWDER, FOR SOLUTION INTRAMUSCULAR; INTRAVENOUS at 10:42

## 2018-09-11 RX ADMIN — SODIUM CHLORIDE, POTASSIUM CHLORIDE, SODIUM LACTATE AND CALCIUM CHLORIDE 100 ML/HR: 600; 310; 30; 20 INJECTION, SOLUTION INTRAVENOUS at 16:27

## 2018-09-11 RX ADMIN — DEXAMETHASONE SODIUM PHOSPHATE 4 MG: 4 INJECTION, SOLUTION INTRAMUSCULAR; INTRAVENOUS at 11:43

## 2018-09-11 RX ADMIN — SUCCINYLCHOLINE CHLORIDE 40 MG: 20 INJECTION, SOLUTION INTRAMUSCULAR; INTRAVENOUS at 11:02

## 2018-09-11 RX ADMIN — SODIUM CHLORIDE, POTASSIUM CHLORIDE, SODIUM LACTATE AND CALCIUM CHLORIDE: 600; 310; 30; 20 INJECTION, SOLUTION INTRAVENOUS at 12:15

## 2018-09-11 RX ADMIN — SODIUM CHLORIDE, POTASSIUM CHLORIDE, SODIUM LACTATE AND CALCIUM CHLORIDE 1000 ML: 600; 310; 30; 20 INJECTION, SOLUTION INTRAVENOUS at 06:05

## 2018-09-11 RX ADMIN — PROPOFOL 50 MG: 10 INJECTION, EMULSION INTRAVENOUS at 11:02

## 2018-09-11 RX ADMIN — SUCCINYLCHOLINE CHLORIDE 120 MG: 20 INJECTION, SOLUTION INTRAMUSCULAR; INTRAVENOUS at 10:37

## 2018-09-11 RX ADMIN — LIDOCAINE HYDROCHLORIDE 50 MG: 20 INJECTION, SOLUTION INFILTRATION; PERINEURAL at 10:37

## 2018-09-11 RX ADMIN — ONDANSETRON HYDROCHLORIDE 4 MG: 2 SOLUTION INTRAMUSCULAR; INTRAVENOUS at 12:17

## 2018-09-11 RX ADMIN — SERTRALINE 100 MG: 100 TABLET, FILM COATED ORAL at 16:32

## 2018-09-11 RX ADMIN — ROCURONIUM BROMIDE 10 MG: 10 INJECTION INTRAVENOUS at 10:36

## 2018-09-11 RX ADMIN — EPHEDRINE SULFATE 10 MG: 50 INJECTION INTRAMUSCULAR; INTRAVENOUS; SUBCUTANEOUS at 13:50

## 2018-09-11 RX ADMIN — PROPOFOL 150 MG: 10 INJECTION, EMULSION INTRAVENOUS at 10:37

## 2018-09-11 RX ADMIN — ZOLPIDEM TARTRATE 5 MG: 5 TABLET, FILM COATED ORAL at 20:50

## 2018-09-11 RX ADMIN — LIDOCAINE AND PRILOCAINE: 25; 25 CREAM TOPICAL at 06:22

## 2018-09-11 RX ADMIN — ACETAMINOPHEN 1000 MG: 500 TABLET, FILM COATED ORAL at 09:29

## 2018-09-11 RX ADMIN — TECHNETIUM TC 99M SULFUR COLLOID 1 DOSE: KIT at 07:24

## 2018-09-11 RX ADMIN — LIDOCAINE HYDROCHLORIDE 0.5 ML: 10 INJECTION, SOLUTION EPIDURAL; INFILTRATION; INTRACAUDAL; PERINEURAL at 06:05

## 2018-09-11 RX ADMIN — HYDROCODONE BITARTRATE AND ACETAMINOPHEN 1 TABLET: 7.5; 325 TABLET ORAL at 17:44

## 2018-09-11 RX ADMIN — FENTANYL CITRATE 50 MCG: 50 INJECTION, SOLUTION INTRAMUSCULAR; INTRAVENOUS at 13:50

## 2018-09-11 RX ADMIN — CEFAZOLIN 1 G: 1 INJECTION, POWDER, FOR SOLUTION INTRAMUSCULAR; INTRAVENOUS at 18:58

## 2018-09-11 RX ADMIN — FENTANYL CITRATE 100 MCG: 50 INJECTION, SOLUTION INTRAMUSCULAR; INTRAVENOUS at 10:34

## 2018-09-11 RX ADMIN — ATORVASTATIN CALCIUM 10 MG: 10 TABLET, FILM COATED ORAL at 20:03

## 2018-09-11 RX ADMIN — FENTANYL CITRATE 50 MCG: 50 INJECTION, SOLUTION INTRAMUSCULAR; INTRAVENOUS at 13:12

## 2018-09-11 NOTE — OP NOTE
Preprocedure diagnosis: Malignant melanoma superior and left scalp    Post procedure diagnosis:  Malignant melanoma superior and left scalp    Procedure performed:      1) Excision malignant neoplasm of skin of scalp, 6 cm x 5.5 cm, subfascial     2) full-thickness skin graft closure of 6 cm x 5.5 cm     3) left selective neck dissection (level IIA and IIB)     4) right sentinel lymph node biopsy.     5) complex closure skin of right neck, 10 cm    Surgeon: Eric Silva M.D.    Anesthesia: Gen. with 7 cc of 1% lidocaine with 1:100,000 epinephrine    Specimen:  1) Malignant melanoma of scalp  - stitch at 12 o'clock, permanent (12 o'clock response to the patient's anterior, 3 o'clock would be his right)     2) left sentinel lymph node, level II    3) left neck contents, selective neck dissection, level II    4) right sentinel lymph node, level V    5) right level V neck contents (incomplete dissection of level V)    Complications: None    Disposition: Home    Details: After patient verification and informed consent was obtained, the patient was taken to the operating room and laid supine on the operative table.  The skin was cleansed with alcohol, the lesion was marked with 1.5 cm margins, and the skin of the left neck, right neck in the supraclavicular skin and scalp were infiltrated with 1% lidocaine of 1:100,000 epinephrine.  The skin was sterilely prepped with Betadine and the patient was sterilely draped.    The left sentinel node dissection was performed.  A 8 cm incision was created in a cervical crease partially 3 cm below the angle of the mandible utilizing a 15 blade.  I dissected through the platysma muscle and obtained hemostasis with bipolar cautery set at 15.  The external jugular vein was suture ligated using 3-0 silk sutures.  Subplatysmal skin flaps were elevated superiorly and inferiorly, identifying the anterior border of the sternomastoid muscle and identified the greater auricular nerve and  preserve this.  Dissection was then carried along the anterior border of the sternomastoid muscle, identifying 2 branches of cranial nerve XI.  These branches were preserved.  I then used the Bowers counter to follow the lymph node to level IIb.  As a result, I performed a selective neck dissection in this area.  Identified the posterior belly of the digastric muscle and skeletonized this.  I identified the internal jugular vein and stay superficial to this.  I further dissected the neck contents off the anterior border of the sternomastoid muscle, again preserving the spinal accessory nerve.  The spinal accessory nerves were skeletonized and the fibrofatty tissue was passed under these nerves to superior neck.  I then dissected onto the submuscular floor utilizing the Bovie cautery set at 25.  This is removed and the Cornelio counter demonstrated mild uptake.  I then tracked this more superiorly, removing 2 additional packets of fibrofatty tissue.  The last had a significant uptake and was sent as a sentinel lymph node.  The additional neck contents were sent as level II.  I then placed a 10 Turkmen drain and secured this to the skin using a 2-0 silk suture.  A Valsalva maneuver was performed and hemostasis was obtained with bipolar cautery set at 25.  I then reapproximated the latissimus muscle using 3-0 Vicryl suture.  I reapproximated the dermis using 4-0 Vicryl suture.  I closed the skin using horizontal mattress 4-0 nylon sutures.  Mastisol and Steri-Strips were placed.    Point the scalp to remove the melanoma.  I incised the skin using a 15 blade and undermined this in the subcutaneous galeal plane using a 15 blade and curved iris scissors.  This is oriented with a stitch at 12 o'clock and sent for permanent section pathology.    At this point, I measured the defect was 6 cm x 5.5 cm.  I designed a skin graft taken from the right supra-auricular skin and converted this into a fusiform fashion measuring  approximately 10 cm x 5.5 cm.  The skin was incised using a 15 blade and undermining the skin using the Bovie cautery set at 25.    The skin was widely undermined for approximate 3 cm superiorly and 3 cm inferiorly in anticipation of closure.    I used the Edmond counter to retract the lymph node into level V area.  I initially dissected along the anterior Brown border of the sternocleidomastoid muscle, but the uptake was noted posterior to this.  I then went on the posterior aspect of sternomastoid muscle and removed fibrofatty tissue multiple pieces and level V.  The third specimen demonstrated significant uptake and this was sent as the sentinel node.  Additional level V contents were sent separately.    I then closed the platysma muscle using 3-0 Vicryl suture.  The dermis was closed using 4-0 Vicryl suture.  I placed a running, locking 5-0 fast absorbing gut to complete this complex closure.  I then placed Mastisol and Steri-Strips.    The skin graft was thinned and the back table using curved iris scissors and trimmed for inset.  This was inset using 4-0 silk sutures with the ends left long for the bolster.  I then placed a running locking 5-0 fast absorbing gut and tacking sutures of 5-0 fast absorbing gut. A xeroform and cotton coated in bactroban ointment bolster was then placed and affixed using the silk sutures.     The patient was weaned from anesthesia, and transferred to the PACU for recovery without apparent complication.

## 2018-09-11 NOTE — INTERVAL H&P NOTE
H&P reviewed.  The patient was examined and there are no changes to the H&P. Off ASA and Plavix for 1-2 weeks. Left level II node marked.

## 2018-09-11 NOTE — H&P (VIEW-ONLY)
CC:   Chief Complaint   Patient presents with   • Skin Lesion     SCALP MELANOMA       HPI: Ayan Pichardo is a 86 y.o. male who reports a skin lesion on the scalp.  This lesion has been present for 6 months.  The lesion has changed. He reports the lesion came up as a knot with a central black spot.  Nothing makes the lesion better or worse.  An excisional biopsy has been performed by Dr. Galarza on 7/16/18 revealing malignant melanoma 2.5 mm in depth with adjacent extensive mature scar. No ulceration with 5/mm2 mitoses.  He had a melanoma removed from this same area 10 years ago in [place] - not sure how it was reconstructed.    Prior history of skin cancer: prior history of malignant melanoma of the scalp removed approximately 10 years ago by Dr. Paula in [place]. His wife is unsure of the pathology.    Dermatologist: Rahat Galarza MD    He is taking ASA and Plavix for history of Afib requiring ablation x 2 by Dr. Alvares at [facility] in [place].     PFSH:  Past Medical History:   Diagnosis Date   • Atrial fibrillation (CMS/HCC)    • Back pain    • Brain atrophy    • CAD (coronary artery disease)    • Constipation    • Hyperlipidemia    • Hypersomnia    • Hypertension    • Melanoma (CMS/HCC)     SCALP   • Memory deficit    • Neuropathy    • Radiculopathy      Past Surgical History:   Procedure Laterality Date   • AV NODE ABLATION     • PENILE PROSTHESIS IMPLANT       Family History   Problem Relation Age of Onset   • Cancer Mother    • Hypertension Mother      Social History   Substance Use Topics   • Smoking status: Never Smoker   • Smokeless tobacco: Never Used   • Alcohol use No     Allergies:  Patient has no known allergies.    Current Outpatient Prescriptions:   •  aspirin 81 MG EC tablet, Take 81 mg by mouth Daily., Disp: , Rfl:   •  carvedilol (COREG) 6.25 MG tablet, Take 6.25 mg by mouth 2 (Two) Times a Day With Meals., Disp: , Rfl:   •  clopidogrel (PLAVIX) 75 MG tablet, Plavix 75 mg tablet  Take 1  "tablet every day by oral route., Disp: , Rfl:   •  lisinopril (PRINIVIL,ZESTRIL) 10 MG tablet, Take 10 mg by mouth Daily., Disp: , Rfl:   •  meloxicam (MOBIC) 15 MG tablet, Take 15 mg by mouth Daily., Disp: , Rfl:   •  nitroglycerin (NITROSTAT) 0.4 MG SL tablet, , Disp: , Rfl:   •  sertraline (ZOLOFT) 100 MG tablet, Take 100 mg by mouth Daily., Disp: , Rfl:   •  simvastatin (ZOCOR) 10 MG tablet, Take 10 mg by mouth Every Night., Disp: , Rfl:     ROS:  Review of Systems   Constitutional: Negative for activity change, appetite change, chills, fatigue, fever and unexpected weight change.   HENT: Negative for congestion, dental problem, facial swelling and nosebleeds.    Eyes: Negative for discharge and redness.   Skin: Negative for color change, pallor and rash.   Hematological: Negative for adenopathy. Does not bruise/bleed easily.       PE:  /80   Pulse 83   Temp 98 °F (36.7 °C)   Ht 188 cm (74\")   Wt 85.7 kg (189 lb)   BMI 24.27 kg/m²   Physical Exam   Constitutional: He is oriented to person, place, and time. He appears well-developed and well-nourished. He is cooperative. No distress.   HENT:   Head: Normocephalic and atraumatic.   Right Ear: External ear normal.   Left Ear: External ear normal.   Nose: Nose normal. No nasal deformity.   Mouth/Throat: Uvula is midline, oropharynx is clear and moist and mucous membranes are normal.   Eyes: Pupils are equal, round, and reactive to light. Conjunctivae, EOM and lids are normal. Right eye exhibits no discharge. Left eye exhibits no discharge. No scleral icterus.   Neck: Normal range of motion and phonation normal. Neck supple. No tracheal deviation present.   Cardiovascular: Normal rate and regular rhythm.    Pulmonary/Chest: Effort normal. No stridor. No respiratory distress.   Musculoskeletal: Normal range of motion. He exhibits no edema or deformity.   Lymphadenopathy:     He has no cervical adenopathy.   Neurological: He is alert and oriented to person, " place, and time. He has normal strength. No cranial nerve deficit. Coordination normal.   Skin: Skin is warm and dry. No rash noted. He is not diaphoretic. No erythema. No pallor.   Left central scalp (just left of midline) with 3 x 2.5 cm granulating wound    Psychiatric: He has a normal mood and affect. His speech is normal and behavior is normal. Judgment and thought content normal. Cognition and memory are normal.   Nursing note and vitals reviewed.          Data review:      Assessment:  1. Recurrent malignant melanoma of skin (CMS/HCC)        Plan:    1.  I have offered and recommended excision of the malignant melanoma of the scalp with FTSG in the operating room with permanent section analysis. I have also recommended sentinel lymph node biopsy with lymphoscintigraphy. Since this lesion is marcela the left paramedian, will follow left-sided drainage for SLNBx.  2. The risks and benefits of my recommendations, as well as other treatment options were discussed with the patient and wife today.   3. Discussion of skin lesion. Discussed risks, benefits, alternatives, and possible complications of excision of the skin lesion with reconstruction utilizing local tissue rearrangement, full-thickness skin grafting, or local interpolated flaps. Risks include, but are not limited too: bleeding, infection, hematoma, recurrence, need for additional procedures, flap failure, cosmetic deformity. Patient understands risks and would like to proceed with surgery.  4. We will contact Dr. Alvares at [facility] regarding clearance to stop ASA and Plavix prior to the procedure.      Return for 1 week postoperatively.      Eric Silva MD   08/22/2018  10:47 AM

## 2018-09-11 NOTE — PLAN OF CARE
Problem: Patient Care Overview  Goal: Plan of Care Review  Outcome: Ongoing (interventions implemented as appropriate)   09/11/18 1727   Coping/Psychosocial   Plan of Care Reviewed With patient   Plan of Care Review   Progress no change   OTHER   Outcome Summary Received patient from surgery. Dsg to top of head w/ sutures intact. Bilat neck incisions w/ steri strips intact. MILAGROS drain to left side of neck draining well. Reports pain at 2 however states that is the same pain that has been present prior to admission. Denies nausea. Voiding. Up to bedside chair. Tolerating diet. Will cont to monitor.      Goal: Individualization and Mutuality  Outcome: Ongoing (interventions implemented as appropriate)    Goal: Discharge Needs Assessment  Outcome: Ongoing (interventions implemented as appropriate)    Goal: Interprofessional Rounds/Family Conf  Outcome: Ongoing (interventions implemented as appropriate)      Problem: Surgery Nonspecified (Adult)  Goal: Signs and Symptoms of Listed Potential Problems Will be Absent, Minimized or Managed (Surgery Nonspecified)  Outcome: Ongoing (interventions implemented as appropriate)   09/11/18 1727   Goal/Outcome Evaluation   Problems Assessed (Surgery) all   Problems Present (Surgery) pain     Goal: Anesthesia/Sedation Recovery  Outcome: Outcome(s) achieved Date Met: 09/11/18

## 2018-09-11 NOTE — PROGRESS NOTES
Postop check:    Doing well.  Mild left neck soreness.  No SOA.      Drain with around 20 cc sanguinous discharge.  No hematoma.  CN XI intact.  No GA numbness.    Recovering well    Requesting Ambien.  Plan on discharge in AM    Eric Silva MD  09/11/18  4:03 PM

## 2018-09-11 NOTE — ANESTHESIA POSTPROCEDURE EVALUATION
"Patient: Ayan Pichardo    Procedure Summary     Date:  09/11/18 Room / Location:  [facility] OR  /  [facility] OR    Anesthesia Start:  1031 Anesthesia Stop:  1424    Procedure:  Procedure performed:     1) Excision malignant neoplasm of skin of scalp, 6 cm x 5.5 cm, subfascial    2) full-thickness skin graft closure of 6 cm x 5.5 cm    3) left selective neck dissection (level IIA and IIB)    4) right sentinel lymph node biopsy.    5) complex closure skin of right neck, 10 cm  (N/A ) Diagnosis:       Recurrent malignant melanoma of skin (CMS/HCC)      (Recurrent malignant melanoma of skin (CMS/HCC) [C43.9])    Surgeon:  Eric Silva MD Provider:  Willi Mahan CRNA    Anesthesia Type:  general ASA Status:  3          Anesthesia Type: general  Last vitals  BP   164/69 (09/11/18 1611)   Temp   97.4 °F (36.3 °C) (09/11/18 1611)   Pulse   62 (09/11/18 1611)   Resp   18 (09/11/18 1611)     SpO2   94 % (09/11/18 1611)     Post Anesthesia Care and Evaluation    PONV Status: none  Comments: Patient d/c from PACU prior to anes eval based on Wesley score.  Please see RN notes for details of d/c criteria.    Blood pressure 164/69, pulse 62, temperature 97.4 °F (36.3 °C), temperature source Oral, resp. rate 18, height 183 cm (72.05\"), weight 87.2 kg (192 lb 3.9 oz), SpO2 94 %.          "

## 2018-09-11 NOTE — ANESTHESIA PROCEDURE NOTES
Airway  Urgency: elective    Date/Time: 9/11/2018 10:39 AM  End Time:9/11/2018 10:39 AM  Airway not difficult    General Information and Staff    Patient location during procedure: OR  CRNA: DRU CHANG    Indications and Patient Condition  Indications for airway management: airway protection    Preoxygenated: yes  Mask difficulty assessment: 1 - vent by mask    Final Airway Details  Final airway type: endotracheal airway      Successful airway: ETT  Cuffed: yes   Successful intubation technique: direct laryngoscopy  Facilitating devices/methods: intubating stylet  Endotracheal tube insertion site: oral  Blade: Vega  Blade size: 2  ETT size: 7.5 mm  Cormack-Lehane Classification: grade IIb - view of arytenoids or posterior of glottis only  Placement verified by: capnometry   Cuff volume (mL): 6  Measured from: teeth  ETT to teeth (cm): 22  Number of attempts at approach: 1

## 2018-09-11 NOTE — ANESTHESIA PREPROCEDURE EVALUATION
Anesthesia Evaluation     Patient summary reviewed   no history of anesthetic complications:  NPO Solid Status: > 8 hours  NPO Liquid Status: > 8 hours           Airway   Mallampati: I  TM distance: >3 FB  Neck ROM: full  No difficulty expected  Dental          Pulmonary - negative pulmonary ROS   (-) COPD, asthma, sleep apnea, not a smoker  Cardiovascular     ECG reviewed  Patient on routine beta blocker and Beta blocker given within 24 hours of surgery    (+) hypertension, CAD, cardiac stents dysrhythmias (s/p ablation) Atrial Fib,   (-) pacemaker, past MI, angina    ROS comment: Patient took Bblocker 9/10 @ 10am/ HR during exam 52. Will not give additional bblockade    Neuro/Psych  (-) seizures, TIA, CVA  GI/Hepatic/Renal/Endo    (-) liver disease, no renal disease, diabetes    Musculoskeletal     (+) back pain,   Abdominal    Substance History      OB/GYN          Other      history of cancer (melanoma)                    Anesthesia Plan    ASA 3     general     intravenous induction   Anesthetic plan, all risks, benefits, and alternatives have been provided, discussed and informed consent has been obtained with: patient.

## 2018-09-12 VITALS
RESPIRATION RATE: 18 BRPM | HEART RATE: 84 BPM | OXYGEN SATURATION: 99 % | HEIGHT: 72 IN | SYSTOLIC BLOOD PRESSURE: 147 MMHG | BODY MASS INDEX: 26.04 KG/M2 | DIASTOLIC BLOOD PRESSURE: 73 MMHG | WEIGHT: 192.24 LBS | TEMPERATURE: 97.8 F

## 2018-09-12 PROBLEM — I48.0 PAROXYSMAL ATRIAL FIBRILLATION (HCC): Status: ACTIVE | Noted: 2018-09-12

## 2018-09-12 PROBLEM — I20.9 ANGINA PECTORIS (HCC): Status: ACTIVE | Noted: 2018-09-12

## 2018-09-12 PROBLEM — M79.604 PAIN IN BOTH LOWER EXTREMITIES: Status: ACTIVE | Noted: 2018-09-12

## 2018-09-12 PROBLEM — M54.16 LUMBAR RADICULOPATHY: Status: ACTIVE | Noted: 2018-09-12

## 2018-09-12 PROBLEM — I10 BENIGN ESSENTIAL HYPERTENSION: Status: ACTIVE | Noted: 2018-09-12

## 2018-09-12 PROBLEM — R20.1 HYPOESTHESIA OF SKIN: Status: ACTIVE | Noted: 2018-09-12

## 2018-09-12 PROBLEM — I25.10 CORONARY ARTERIOSCLEROSIS IN NATIVE ARTERY: Status: ACTIVE | Noted: 2018-09-12

## 2018-09-12 PROBLEM — R53.81 MALAISE AND FATIGUE: Status: ACTIVE | Noted: 2018-09-12

## 2018-09-12 PROBLEM — R06.00 DYSPNEA: Status: ACTIVE | Noted: 2018-09-12

## 2018-09-12 PROBLEM — G60.9 IDIOPATHIC PERIPHERAL NEUROPATHY: Status: ACTIVE | Noted: 2018-09-12

## 2018-09-12 PROBLEM — N40.0 BENIGN PROSTATIC HYPERPLASIA: Status: ACTIVE | Noted: 2018-09-12

## 2018-09-12 PROBLEM — R53.83 MALAISE AND FATIGUE: Status: ACTIVE | Noted: 2018-09-12

## 2018-09-12 PROBLEM — I50.20 SYSTOLIC HEART FAILURE (HCC): Status: ACTIVE | Noted: 2018-09-12

## 2018-09-12 PROBLEM — M79.605 PAIN IN BOTH LOWER EXTREMITIES: Status: ACTIVE | Noted: 2018-09-12

## 2018-09-12 PROBLEM — E78.2 MIXED HYPERLIPIDEMIA: Status: ACTIVE | Noted: 2018-09-12

## 2018-09-12 PROCEDURE — 63710000001 CARVEDILOL 6.25 MG TABLET: Performed by: OTOLARYNGOLOGY

## 2018-09-12 PROCEDURE — A9270 NON-COVERED ITEM OR SERVICE: HCPCS | Performed by: OTOLARYNGOLOGY

## 2018-09-12 PROCEDURE — 94760 N-INVAS EAR/PLS OXIMETRY 1: CPT

## 2018-09-12 PROCEDURE — 99024 POSTOP FOLLOW-UP VISIT: CPT | Performed by: OTOLARYNGOLOGY

## 2018-09-12 PROCEDURE — 63710000001 LISINOPRIL 10 MG TABLET: Performed by: OTOLARYNGOLOGY

## 2018-09-12 PROCEDURE — 25010000003 CEFAZOLIN PER 500 MG: Performed by: OTOLARYNGOLOGY

## 2018-09-12 PROCEDURE — 94799 UNLISTED PULMONARY SVC/PX: CPT

## 2018-09-12 PROCEDURE — 63710000001 SERTRALINE 100 MG TABLET: Performed by: OTOLARYNGOLOGY

## 2018-09-12 RX ORDER — HYDROCODONE BITARTRATE AND ACETAMINOPHEN 7.5; 325 MG/1; MG/1
1 TABLET ORAL EVERY 4 HOURS PRN
Qty: 30 TABLET | Refills: 0 | Status: SHIPPED | OUTPATIENT
Start: 2018-09-12 | End: 2018-09-19

## 2018-09-12 RX ORDER — CEPHALEXIN 500 MG/1
500 CAPSULE ORAL 3 TIMES DAILY
Qty: 21 CAPSULE | Refills: 0 | Status: SHIPPED | OUTPATIENT
Start: 2018-09-12 | End: 2018-09-19

## 2018-09-12 RX ADMIN — SODIUM CHLORIDE, POTASSIUM CHLORIDE, SODIUM LACTATE AND CALCIUM CHLORIDE 100 ML/HR: 600; 310; 30; 20 INJECTION, SOLUTION INTRAVENOUS at 02:12

## 2018-09-12 RX ADMIN — CEFAZOLIN 1 G: 1 INJECTION, POWDER, FOR SOLUTION INTRAMUSCULAR; INTRAVENOUS at 02:02

## 2018-09-12 RX ADMIN — SERTRALINE 100 MG: 100 TABLET, FILM COATED ORAL at 08:32

## 2018-09-12 RX ADMIN — LISINOPRIL 10 MG: 10 TABLET ORAL at 08:32

## 2018-09-12 RX ADMIN — CARVEDILOL 6.25 MG: 6.25 TABLET, FILM COATED ORAL at 08:33

## 2018-09-12 NOTE — DISCHARGE SUMMARY
ENT/[facility] ([facility]) Discharge Summary:    Date of Admission: 9/11/2018  Date of Discharge:  9/12/2018    Discharge Diagnosis: Recurrent melanoma of the scalp    Presenting Problem/History of Present Illness  Recurrent malignant melanoma of skin (CMS/HCC) [C43.9]  Recurrent malignant melanoma of skin (CMS/HCC) [C43.9]       Hospital Course  Patient is a 86 y.o. male presented with recurrent melanoma of the scalp.  He was taken to the operating room on 09/11/2018 for excision of the melanoma on the scalp with full-thickness skin graft reconstruction, left selective neck dissection, right sentinel lymph node biopsy.  This is performed without complication.  He was admitted overnight for drain placement.  His drain had an output of 30/20/20 and was removed in the morning.  He was ambulatory with no pain.  Crated nerve XI was intact.  He was urinating.  He was not passing gas or having a bowel movement, but this is normal for his baseline.  He was ready for discharge.      Procedures Performed  Procedure(s):  Procedure performed:     1) Excision malignant neoplasm of skin of scalp, 6 cm x 5.5 cm, subfascial    2) full-thickness skin graft closure of 6 cm x 5.5 cm    3) left selective neck dissection (level IIA and IIB)    4) right sentinel lymph node biopsy.    5) complex closure skin of right neck, 10 cm        Consults:   Consults     No orders found for last 30 day(s).          Pertinent Test Results: Pathology is pending    Condition on Discharge:  Stable    Vital Signs  Temp:  [96.8 °F (36 °C)-98 °F (36.7 °C)] 97.8 °F (36.6 °C)  Heart Rate:  [52-84] 84  Resp:  [14-18] 18  BP: (112-164)/(55-81) 147/73    Physical Exam:   Physical Exam   Awake and interactive in no acute distress.  His neck to restrict mild bruising.  There is no hematoma formation.  The MILAGROS drain was removed.  The suture lines have Steri-Strips in place without drainage.  His skin graft site of the scalp had a bolster dressing in place.    Discharge  Disposition  Home or Self Care    Discharge Medications     Discharge Medications      New Medications      Instructions Start Date   HYDROcodone-acetaminophen 7.5-325 MG per tablet  Commonly known as:  NORCO   1 tablet, Oral, Every 4 Hours PRN         Continue These Medications      Instructions Start Date   aspirin 81 MG EC tablet   81 mg, Oral, Daily      carvedilol 6.25 MG tablet  Commonly known as:  COREG   6.25 mg, Oral, 2 Times Daily With Meals      lisinopril 10 MG tablet  Commonly known as:  PRINIVIL,ZESTRIL   10 mg, Oral, Daily      meloxicam 15 MG tablet  Commonly known as:  MOBIC   15 mg, Oral, Daily      nitroglycerin 0.4 MG SL tablet  Commonly known as:  NITROSTAT   0.4 mg, Sublingual, Every 5 Minutes PRN      sertraline 100 MG tablet  Commonly known as:  ZOLOFT   100 mg, Oral, Daily      simvastatin 10 MG tablet  Commonly known as:  ZOCOR   10 mg, Oral, Nightly             Discharge Diet:   Diet Instructions     Advance Diet As Tolerated             Activity at Discharge:   Activity Instructions     Discharge Activity Restrictions       1) No driving for 1 week and no longer taking narcotics.   2) Return to school / work in 1 week.  3) May shower - keep water below the collar bone.  4) Do not lift / push / pull more then 10 lbs.          Follow-up Appointments  Future Appointments  Date Time Provider Department Center   9/19/2018 11:00 AM Eric Silva MD [facility] None     Additional Instructions for the Follow-ups that You Need to Schedule     Discharge Follow-up with Specified Provider: [facility]; 1 Week    As directed      To:  [facility]    Follow Up:  1 Week                 Eric Silva MD  09/12/18  8:59 AM

## 2018-09-12 NOTE — PLAN OF CARE
Problem: Patient Care Overview  Goal: Plan of Care Review  Outcome: Ongoing (interventions implemented as appropriate)   09/12/18 0325   Coping/Psychosocial   Plan of Care Reviewed With patient;spouse   Plan of Care Review   Progress no change   OTHER   Outcome Summary Pt took an ambien for sleep and 1-2 hrs later had a confused episode of pulling his IV out ; bed alarm turned on; replaced IV site     Goal: Individualization and Mutuality  Outcome: Ongoing (interventions implemented as appropriate)   09/11/18 4831   Individualization   Patient Specific Preferences Patient likes to stand at the toilet to urinate. Will not use a urinal. Also does not want a nurse present. Prefers to toilet independently.    Patient Specific Goals (Include Timeframe) Surgery and home asap   Patient Specific Interventions IVF, monitor and treat as ordered   Mutuality/Individual Preferences   How Would You and/or Your Support Person Like to Participate in Your Care? remain informed on care       Problem: Surgery Nonspecified (Adult)  Goal: Signs and Symptoms of Listed Potential Problems Will be Absent, Minimized or Managed (Surgery Nonspecified)  Outcome: Ongoing (interventions implemented as appropriate)   09/12/18 0325   Goal/Outcome Evaluation   Problems Assessed (Surgery) all   Problems Present (Surgery) situational response;bleeding

## 2018-09-13 LAB
CYTO UR: NORMAL
LAB AP CASE REPORT: NORMAL
PATH REPORT.FINAL DX SPEC: NORMAL
PATH REPORT.GROSS SPEC: NORMAL

## 2018-09-19 ENCOUNTER — OFFICE VISIT (OUTPATIENT)
Dept: OTOLARYNGOLOGY | Facility: CLINIC | Age: 83
End: 2018-09-19

## 2018-09-19 VITALS
WEIGHT: 192 LBS | HEART RATE: 70 BPM | TEMPERATURE: 98 F | DIASTOLIC BLOOD PRESSURE: 75 MMHG | BODY MASS INDEX: 26.01 KG/M2 | HEIGHT: 72 IN | RESPIRATION RATE: 20 BRPM | SYSTOLIC BLOOD PRESSURE: 142 MMHG

## 2018-09-19 DIAGNOSIS — C43.9 RECURRENT MALIGNANT MELANOMA OF SKIN (HCC): Primary | ICD-10-CM

## 2018-09-19 PROCEDURE — 99024 POSTOP FOLLOW-UP VISIT: CPT | Performed by: OTOLARYNGOLOGY

## 2018-09-19 NOTE — PROGRESS NOTES
"Ayan Pichardo returns to the office following excision of the malignant melanoma of the scalp, full-thickness skin graft closure, left selective neck dissection, right sentinel lymph node biopsy, and complex closure skin of the right neck on 09/11/2018.    SUBJECTIVE:  Since surgery he is doing well. He denies fever, chills, bleeding, or drainage. The pain has decreased since the procedure    OBJECTIVE:  /75   Pulse 70   Temp 98 °F (36.7 °C)   Resp 20   Ht 182.9 cm (72\")   Wt 87.1 kg (192 lb)   BMI 26.04 kg/m²   Bolster removed. FTSG is pink and healthy. Bilateral neck incisions are healing well- sutures removed. There is no hematoma.  CNXI is intact.    Pathology:      ASSESSMENT:  Ayan was seen today for post-op.    Diagnoses and all orders for this visit:    Recurrent malignant melanoma of skin (CMS/HCC)    BMI 26.0-26.9,adult        PLAN:   Pathology report reviewed with patient. May briefly get the graft wet in the shower. Keep the graft coated in vaseline. Follow-up in 3 weeks.      QUALITY MEASURES    Body Mass Index Screening and Follow-Up Plan  Body mass index is 26.04 kg/m².  Patient's Body mass index is 26.04 kg/m². BMI is above normal parameters. Recommendations include: educational material.    Tobacco Use: Screening and Cessation Intervention  Smoking status: Never Smoker                                                              Smokeless tobacco: Never Used                            Eric Silva MD   09/19/2018  11:34 AM    "

## 2018-10-09 RX ORDER — LISINOPRIL 10 MG/1
10 TABLET ORAL DAILY
Qty: 90 TABLET | Refills: 3 | Status: SHIPPED | OUTPATIENT
Start: 2018-10-09 | End: 2018-11-27 | Stop reason: SDUPTHER

## 2018-10-09 RX ORDER — SIMVASTATIN 10 MG
10 TABLET ORAL NIGHTLY
Qty: 90 TABLET | Refills: 3 | Status: SHIPPED | OUTPATIENT
Start: 2018-10-09 | End: 2019-07-05 | Stop reason: SDUPTHER

## 2018-10-10 ENCOUNTER — OFFICE VISIT (OUTPATIENT)
Dept: OTOLARYNGOLOGY | Facility: CLINIC | Age: 83
End: 2018-10-10

## 2018-10-10 VITALS
TEMPERATURE: 98 F | HEIGHT: 72 IN | RESPIRATION RATE: 20 BRPM | SYSTOLIC BLOOD PRESSURE: 140 MMHG | DIASTOLIC BLOOD PRESSURE: 79 MMHG | HEART RATE: 80 BPM | BODY MASS INDEX: 25.73 KG/M2 | WEIGHT: 190 LBS

## 2018-10-10 DIAGNOSIS — C43.9 RECURRENT MALIGNANT MELANOMA OF SKIN (HCC): Primary | ICD-10-CM

## 2018-10-10 PROCEDURE — 99024 POSTOP FOLLOW-UP VISIT: CPT | Performed by: OTOLARYNGOLOGY

## 2018-10-10 NOTE — PROGRESS NOTES
"Ayan Pichardo returns to the office following excision of the malignant melanoma of the scalp, full-thickness skin graft closure, left selective neck dissection, right sentinel lymph node biopsy, and complex closure skin of the right neck on 09/11/2018.    SUBJECTIVE:  Since surgery he is doing well. He denies fever, chills, bleeding, or drainage. The pain has ceased since the procedure.  He does report popping and clicking at times when rotating his neck. No pain with this.  No finger weakness or numbness.  This was present prior to surgery.    OBJECTIVE:  /79   Pulse 80   Temp 98 °F (36.7 °C)   Resp 20   Ht 182.9 cm (72\")   Wt 86.2 kg (190 lb)   BMI 25.77 kg/m²   FTSG is pink and healthy. Bilateral neck incisions are healing well. There is noLAD.  CNXI is intact.    Pathology:      ASSESSMENT:  Ayan was seen today for follow-up.    Diagnoses and all orders for this visit:    Recurrent malignant melanoma of skin (CMS/HCC)    BMI 26.0-26.9,adult        PLAN:   Pathology report reviewed with patient. May briefly get the graft wet in the shower. Keep the graft coated in vaseline. Follow-up in 3 weeks.      QUALITY MEASURES    Body Mass Index Screening and Follow-Up Plan  Body mass index is 25.77 kg/m².  Patient's Body mass index is 25.77 kg/m². BMI is above normal parameters. Recommendations include: educational material.    Tobacco Use: Screening and Cessation Intervention  Smoking status: Never Smoker                                                              Smokeless tobacco: Never Used                            Eric Silva MD   09/19/2018  11:34 AM  "

## 2018-11-21 ENCOUNTER — OFFICE VISIT (OUTPATIENT)
Dept: OTOLARYNGOLOGY | Facility: CLINIC | Age: 83
End: 2018-11-21

## 2018-11-21 VITALS
HEIGHT: 72 IN | RESPIRATION RATE: 20 BRPM | DIASTOLIC BLOOD PRESSURE: 93 MMHG | BODY MASS INDEX: 25.73 KG/M2 | SYSTOLIC BLOOD PRESSURE: 158 MMHG | WEIGHT: 190 LBS | TEMPERATURE: 97.6 F | HEART RATE: 64 BPM

## 2018-11-21 DIAGNOSIS — C43.9 RECURRENT MALIGNANT MELANOMA OF SKIN (HCC): Primary | ICD-10-CM

## 2018-11-21 PROCEDURE — 99024 POSTOP FOLLOW-UP VISIT: CPT | Performed by: NURSE PRACTITIONER

## 2018-11-21 NOTE — PROGRESS NOTES
"Ayan Pichardo returns to the office following excision of the malignant melanoma of the scalp, full-thickness skin graft closure, left selective neck dissection, right sentinel lymph node biopsy, and complex closure skin of the right neck on 09/11/2018.    SUBJECTIVE:  Since surgery, he has been doing very well. He denies fever, chills, bleeding, or drainage. The pain has ceased since the procedure.    OBJECTIVE:  /93   Pulse 64   Temp 97.6 °F (36.4 °C)   Resp 20   Ht 182.9 cm (72\")   Wt 86.2 kg (190 lb)   BMI 25.77 kg/m²   FTSG is well healed without evidence of recurrence. Bilateral neck incisions are well healed. There is no lymphadenopathy.  CNXI is intact.    Pathology:      ASSESSMENT:  Ayan was seen today for post-op.    Diagnoses and all orders for this visit:    Recurrent malignant melanoma of skin (CMS/HCC)    BMI 26.0-26.9,adult        PLAN:   Protect the incisions from sunlight. Sunlight to the incisions will cause permanent pigmentation to the incision line and make the incision more noticeable. After the incision has reepithelialized (typically 2-3 weeks after the procedure), you may begin to use sunscreen with an SPF of 15 or greater    I discussed the use of  Vitamin E, Mederma, a high-quality extra virgin olive oil, or a silicone-based wound cream to the incisions to optimize the end result. Apply topically twice daily, or as directed, to help optimize wound healing and decrease erythema.    We discussed the importance of routine skin checks with a dermatologist or your PCP every 6-12 months. He sees Dr. Galarza in May. Follow-up in 6 weeks.        QUALITY MEASURES    Body Mass Index Screening and Follow-Up Plan  Body mass index is 25.77 kg/m².  Patient's Body mass index is 25.77 kg/m². BMI is above normal parameters. Recommendations include: educational material.    Tobacco Use: Screening and Cessation Intervention  Social History    Tobacco Use      Smoking status: Never Smoker      " Smokeless tobacco: Never Used        Juanita Perez, APRN

## 2018-11-27 ENCOUNTER — OFFICE VISIT (OUTPATIENT)
Dept: FAMILY MEDICINE CLINIC | Age: 83
End: 2018-11-27
Payer: MEDICARE

## 2018-11-27 VITALS
TEMPERATURE: 98.3 F | HEART RATE: 84 BPM | SYSTOLIC BLOOD PRESSURE: 168 MMHG | BODY MASS INDEX: 25.78 KG/M2 | DIASTOLIC BLOOD PRESSURE: 99 MMHG | OXYGEN SATURATION: 98 % | WEIGHT: 200.8 LBS

## 2018-11-27 DIAGNOSIS — R26.89 IMBALANCE: ICD-10-CM

## 2018-11-27 DIAGNOSIS — M54.16 LUMBAR RADICULOPATHY: ICD-10-CM

## 2018-11-27 DIAGNOSIS — R27.0 ATAXIA: ICD-10-CM

## 2018-11-27 DIAGNOSIS — I10 ESSENTIAL HYPERTENSION: ICD-10-CM

## 2018-11-27 DIAGNOSIS — I25.10 CORONARY ARTERY DISEASE INVOLVING NATIVE CORONARY ARTERY OF NATIVE HEART WITHOUT ANGINA PECTORIS: ICD-10-CM

## 2018-11-27 DIAGNOSIS — E78.5 HYPERLIPIDEMIA, UNSPECIFIED HYPERLIPIDEMIA TYPE: ICD-10-CM

## 2018-11-27 LAB
ANION GAP SERPL CALCULATED.3IONS-SCNC: 13 MMOL/L (ref 7–19)
BUN BLDV-MCNC: 23 MG/DL (ref 8–23)
CALCIUM SERPL-MCNC: 10 MG/DL (ref 8.8–10.2)
CHLORIDE BLD-SCNC: 105 MMOL/L (ref 98–111)
CO2: 28 MMOL/L (ref 22–29)
CREAT SERPL-MCNC: 1 MG/DL (ref 0.5–1.2)
GFR NON-AFRICAN AMERICAN: >60
GLUCOSE BLD-MCNC: 85 MG/DL (ref 74–109)
HCT VFR BLD CALC: 46.9 % (ref 42–52)
HEMOGLOBIN: 15.3 G/DL (ref 14–18)
MCH RBC QN AUTO: 32.7 PG (ref 27–31)
MCHC RBC AUTO-ENTMCNC: 32.6 G/DL (ref 33–37)
MCV RBC AUTO: 100.2 FL (ref 80–94)
PDW BLD-RTO: 11.9 % (ref 11.5–14.5)
PLATELET # BLD: 125 K/UL (ref 130–400)
PMV BLD AUTO: 10 FL (ref 9.4–12.4)
POTASSIUM SERPL-SCNC: 4.4 MMOL/L (ref 3.5–5)
RBC # BLD: 4.68 M/UL (ref 4.7–6.1)
SODIUM BLD-SCNC: 146 MMOL/L (ref 136–145)
WBC # BLD: 6.4 K/UL (ref 4.8–10.8)

## 2018-11-27 PROCEDURE — 99214 OFFICE O/P EST MOD 30 MIN: CPT | Performed by: FAMILY MEDICINE

## 2018-11-27 RX ORDER — LISINOPRIL 10 MG/1
10 TABLET ORAL 2 TIMES DAILY
Qty: 60 TABLET | Refills: 5 | Status: SHIPPED | OUTPATIENT
Start: 2018-11-27

## 2018-11-27 ASSESSMENT — ENCOUNTER SYMPTOMS
COUGH: 0
VOMITING: 0
SHORTNESS OF BREATH: 0
BLOOD IN STOOL: 0
NAUSEA: 0
DIARRHEA: 0
WHEEZING: 0
EYES NEGATIVE: 1
CONSTIPATION: 0
CHEST TIGHTNESS: 0

## 2018-11-27 ASSESSMENT — PATIENT HEALTH QUESTIONNAIRE - PHQ9
SUM OF ALL RESPONSES TO PHQ9 QUESTIONS 1 & 2: 0
SUM OF ALL RESPONSES TO PHQ QUESTIONS 1-9: 0
2. FEELING DOWN, DEPRESSED OR HOPELESS: 0
1. LITTLE INTEREST OR PLEASURE IN DOING THINGS: 0
SUM OF ALL RESPONSES TO PHQ QUESTIONS 1-9: 0

## 2018-11-27 NOTE — PROGRESS NOTES
Kena SHANKAR [address] [address] [address] [address]  Dept: [phone]  Dept Fax: [phone]  Loc: [phone]    Marilyn Cook is a 80 y.o. male who presents today for his medical conditions/complaints as noted below. Marilyn Cook is c/o of Fall (pt states thathe is having trouble with this balance )        HPI:     HPI  This patient is seen here by the undersigned for management of chronic disease states but comes in only intermittently. He has had some other health issues recently which have precluded his coming here. He did have a lesion on the vertex of his scalp which was excised and found to be melanoma. He did have wider excision done by Dr. Matthias Bell  in [place] nearly a month or so ago. 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 He would like to investigate this if at all possible. He also complains of declining memory. He has been trying Prevagen over-the-counter preparation for memory restoration and we have discussed various other options such as Aricept and Namenda. We have decided not to do this at this point in time. He is receptive to the attending a ataxia clinic if we can find one close by. He states that going to [facility] wasn't considerable of convenience to him but he does have family in [place]. He states he has children there and would like to see if we can find an ataxia clinic there. We were able to find an ataxia clinic and have facilitated an appointment for him to have an evaluation there. There he can go up and visit with his family and stay with them overnight the while he will have family to take him into the clinic and be with him at the visit. The patient does have available Antivert 25 to use it one half to one every 6 hours intervals on an as-needed basis but this remains of questionable help.  We are going to obtain CBC and BMP on the patient while he

## 2018-12-04 ENCOUNTER — TELEPHONE (OUTPATIENT)
Dept: FAMILY MEDICINE CLINIC | Age: 83
End: 2018-12-04

## 2018-12-04 DIAGNOSIS — R53.1 WEAKNESS: Primary | ICD-10-CM

## 2018-12-06 DIAGNOSIS — R53.1 WEAKNESS: ICD-10-CM

## 2018-12-06 LAB
FOLATE: >20 NG/ML (ref 4.5–32.2)
VITAMIN B-12: 753 PG/ML (ref 211–946)

## 2018-12-10 ENCOUNTER — TELEPHONE (OUTPATIENT)
Dept: FAMILY MEDICINE CLINIC | Age: 83
End: 2018-12-10

## 2018-12-10 RX ORDER — RIVASTIGMINE 4.6 MG/24H
1 PATCH, EXTENDED RELEASE TRANSDERMAL DAILY
Qty: 30 PATCH | Refills: 3 | Status: CANCELLED | OUTPATIENT
Start: 2018-12-10

## 2018-12-11 RX ORDER — RIVASTIGMINE 4.6 MG/24H
1 PATCH, EXTENDED RELEASE TRANSDERMAL DAILY
Qty: 30 PATCH | Refills: 3 | Status: SHIPPED | OUTPATIENT
Start: 2018-12-11 | End: 2019-04-29 | Stop reason: ALTCHOICE

## 2018-12-20 RX ORDER — MELOXICAM 15 MG/1
15 TABLET ORAL DAILY
Qty: 90 TABLET | Refills: 3 | Status: SHIPPED | OUTPATIENT
Start: 2018-12-20

## 2018-12-20 RX ORDER — SERTRALINE HYDROCHLORIDE 100 MG/1
TABLET, FILM COATED ORAL
Qty: 90 TABLET | Refills: 3 | Status: SHIPPED | OUTPATIENT
Start: 2018-12-20 | End: 2019-07-05 | Stop reason: SDUPTHER

## 2019-01-01 ENCOUNTER — CLINICAL SUPPORT NO REQUIREMENTS (OUTPATIENT)
Dept: CARDIOLOGY | Facility: CLINIC | Age: 84
End: 2019-01-01

## 2019-01-01 DIAGNOSIS — I45.9 HEART BLOCK: Primary | ICD-10-CM

## 2019-01-02 ENCOUNTER — OFFICE VISIT (OUTPATIENT)
Dept: OTOLARYNGOLOGY | Facility: CLINIC | Age: 84
End: 2019-01-02

## 2019-01-02 VITALS
RESPIRATION RATE: 20 BRPM | HEIGHT: 72 IN | WEIGHT: 190 LBS | HEART RATE: 68 BPM | TEMPERATURE: 97.7 F | DIASTOLIC BLOOD PRESSURE: 80 MMHG | SYSTOLIC BLOOD PRESSURE: 110 MMHG | BODY MASS INDEX: 25.73 KG/M2

## 2019-01-02 DIAGNOSIS — C43.9 RECURRENT MALIGNANT MELANOMA OF SKIN (HCC): Primary | ICD-10-CM

## 2019-01-02 PROCEDURE — 99213 OFFICE O/P EST LOW 20 MIN: CPT | Performed by: OTOLARYNGOLOGY

## 2019-01-02 RX ORDER — LISINOPRIL 10 MG/1
10 TABLET ORAL
COMMUNITY
Start: 2018-11-27 | End: 2019-01-02

## 2019-01-02 NOTE — PROGRESS NOTES
Chief Complaint   Patient presents with   • Melanoma     Recurrent Melanoma of scalp     Skin Cancer Follow-up and Surveillance Visit    Ayan Pichardo presents for a cancer surveillance and skin check following excision of the malignant melanoma of the scalp, full-thickness skin graft closure, left selective neck dissection, right sentinel lymph node biopsy, and complex closure skin of the right neck on 09/11/2018.     Subjective: Since surgery, He is doing well without complaints.  Symptoms denied postoperatively include pain, fever, chills, bleeding and drainage. The patient states the pain has ceased since surgery.     Patient presents for follow-up general head and neck skin examination.  Patient has a history of malignant melanoma.  He has not noted recurrence.  The patient has not noted new worrisome lesions.  Denies neck pain, unintentional weight loss, lymphadenopathy.    Pathology review: Pathology is available. Pathology demonstrates a diagnosis of focal melanoma in-situ without evidence of residual invasive melanoma. All margins are clear. Melanoma in situ present 2.25 mm from 6-9-12:00 margin. Lymph nodes are negative for metastases.    Dermatologist:  Rahat Galarza MD. He is seeing Dr. Galarza in May.     Additional Skin Cancer History: prior history of malignant melanoma of the scalp removed approximately 10 years ago by Dr. Paula in [place].    Past Medical History:   Diagnosis Date   • Atrial fibrillation (CMS/HCC)    • Back pain    • Brain atrophy    • CAD (coronary artery disease)    • Constipation    • Hyperlipidemia    • Hypersomnia    • Hypertension    • Melanoma (CMS/HCC)     SCALP   • Memory deficit    • Neuropathy    • Radiculopathy      Past Surgical History:   Procedure Laterality Date   • AV NODE ABLATION     • PENILE PROSTHESIS IMPLANT     • SCALP/NECK TISSUE EXPANDER INSERTION N/A 9/11/2018    Procedure: Procedure performed:     1) Excision malignant neoplasm of skin of scalp, 6 cm x  "5.5 cm, subfascial    2) full-thickness skin graft closure of 6 cm x 5.5 cm    3) left selective neck dissection (level IIA and IIB)    4) right sentinel lymph node biopsy.    5) complex closure skin of right neck, 10 cm ;  Surgeon: Eric Silva MD;  Location: [facility];  Service: ENT     Family History   Problem Relation Age of Onset   • Cancer Mother    • Hypertension Mother      Social History     Tobacco Use   • Smoking status: Never Smoker   • Smokeless tobacco: Never Used   Substance Use Topics   • Alcohol use: No   • Drug use: No     Allergies:  Patient has no known allergies.    Current Outpatient Medications:   •  carvedilol (COREG) 6.25 MG tablet, Take 6.25 mg by mouth 2 (Two) Times a Day With Meals., Disp: , Rfl:   •  meloxicam (MOBIC) 15 MG tablet, Take 15 mg by mouth Daily., Disp: , Rfl:   •  nitroglycerin (NITROSTAT) 0.4 MG SL tablet, Place 0.4 mg under the tongue Every 5 (Five) Minutes As Needed., Disp: , Rfl:   •  sertraline (ZOLOFT) 100 MG tablet, Take 100 mg by mouth Daily., Disp: , Rfl:   •  simvastatin (ZOCOR) 10 MG tablet, Take 10 mg by mouth Every Night., Disp: , Rfl:   •  aspirin 81 MG EC tablet, Take 81 mg by mouth Daily., Disp: , Rfl:     Review of Systems   Constitutional: Negative for activity change, appetite change, chills, fatigue, fever and unexpected weight change.   HENT: Negative for congestion, dental problem, facial swelling and nosebleeds.    Eyes: Negative for discharge and redness.   Musculoskeletal: Positive for arthralgias. Negative for neck pain.   Skin: Negative for color change, pallor and rash.   Hematological: Negative for adenopathy. Does not bruise/bleed easily.          Objective:  /80   Pulse 68   Temp 97.7 °F (36.5 °C)   Resp 20   Ht 182.9 cm (72\")   Wt 86.2 kg (190 lb)   BMI 25.77 kg/m²     Physical Exam   Constitutional: He is oriented to person, place, and time. He appears well-developed and well-nourished. He is cooperative. No distress.   HENT: "   Head: Normocephalic and atraumatic.       Right Ear: External ear normal.   Left Ear: External ear normal.   Nose: Nose normal. No nasal deformity.   Mouth/Throat: Uvula is midline, oropharynx is clear and moist and mucous membranes are normal.   Eyes: Conjunctivae, EOM and lids are normal. Pupils are equal, round, and reactive to light. Right eye exhibits no discharge. Left eye exhibits no discharge. No scleral icterus.   Neck: Normal range of motion and phonation normal. Neck supple. No tracheal deviation present.   Cardiovascular: Normal rate and regular rhythm.   Pulmonary/Chest: Effort normal. No stridor. No respiratory distress.   Musculoskeletal: Normal range of motion. He exhibits no edema or deformity.   Lymphadenopathy:     He has no cervical adenopathy.   Neurological: He is alert and oriented to person, place, and time. He has normal strength. No cranial nerve deficit. Coordination normal.   Skin: Skin is warm and dry. No rash noted. He is not diaphoretic. No erythema. No pallor.   Psychiatric: He has a normal mood and affect. His speech is normal and behavior is normal. Judgment and thought content normal. Cognition and memory are normal.   Nursing note and vitals reviewed.      Results Reviewed:        Assessment:  Ayan was seen today for melanoma.    Diagnoses and all orders for this visit:    Recurrent malignant melanoma of skin (CMS/HCC)    BMI 26.0-26.9,adult        Plan:    Protect the incisions from sunlight. Sunlight to the incisions will cause permanent pigmentation to the incision line and make the incision more noticeable. After the incision has reepithelialized (typically 2-3 weeks after the procedure), you may begin to use sunscreen with an SPF of 15 or greater    I discussed the use of  Vitamin E, Mederma, a high-quality extra virgin olive oil, or a silicone-based wound cream to the incisions to optimize the end result. Apply topically twice daily, or as directed, to help optimize wound  healing and decrease erythema.    Has F/U with Dr. Galarza in May; F/U with me in March.    QUALITY MEASURES    Body Mass Index Screening and Follow-Up Plan  Body mass index is 25.77 kg/m².  Patient's Body mass index is 25.77 kg/m². BMI is above normal parameters. Recommendations include: educational material.    Tobacco Use: Screening and Cessation Intervention  Social History    Tobacco Use      Smoking status: Never Smoker      Smokeless tobacco: Never Used      Return in about 3 months (around 4/2/2019), or if symptoms worsen or fail to improve, for Recheck.      Eric Silva MD  01/02/19  3:34 PM

## 2019-03-18 RX ORDER — CARVEDILOL 6.25 MG/1
TABLET ORAL
Qty: 180 TABLET | Refills: 3 | Status: SHIPPED | OUTPATIENT
Start: 2019-03-18

## 2019-04-29 ENCOUNTER — OFFICE VISIT (OUTPATIENT)
Dept: FAMILY MEDICINE CLINIC | Age: 84
End: 2019-04-29
Payer: MEDICARE

## 2019-04-29 VITALS
DIASTOLIC BLOOD PRESSURE: 86 MMHG | OXYGEN SATURATION: 99 % | SYSTOLIC BLOOD PRESSURE: 134 MMHG | WEIGHT: 196 LBS | BODY MASS INDEX: 25.16 KG/M2 | HEART RATE: 81 BPM | RESPIRATION RATE: 14 BRPM | TEMPERATURE: 97.9 F

## 2019-04-29 DIAGNOSIS — E56.9 VITAMIN DEFICIENCY: ICD-10-CM

## 2019-04-29 DIAGNOSIS — R53.1 WEAKNESS: ICD-10-CM

## 2019-04-29 DIAGNOSIS — R26.89 LOSS OF BALANCE: ICD-10-CM

## 2019-04-29 DIAGNOSIS — R41.3 MEMORY LOSS: ICD-10-CM

## 2019-04-29 DIAGNOSIS — E55.9 VITAMIN D DEFICIENCY: ICD-10-CM

## 2019-04-29 DIAGNOSIS — M54.16 LUMBAR RADICULOPATHY: ICD-10-CM

## 2019-04-29 DIAGNOSIS — I10 ESSENTIAL HYPERTENSION: ICD-10-CM

## 2019-04-29 DIAGNOSIS — E78.5 HYPERLIPIDEMIA, UNSPECIFIED HYPERLIPIDEMIA TYPE: ICD-10-CM

## 2019-04-29 DIAGNOSIS — I25.10 CORONARY ARTERY DISEASE INVOLVING NATIVE CORONARY ARTERY OF NATIVE HEART WITHOUT ANGINA PECTORIS: ICD-10-CM

## 2019-04-29 DIAGNOSIS — R27.0 ATAXIA: ICD-10-CM

## 2019-04-29 DIAGNOSIS — G31.9 BRAIN ATROPHY (HCC): ICD-10-CM

## 2019-04-29 DIAGNOSIS — I48.0 PAROXYSMAL ATRIAL FIBRILLATION (HCC): ICD-10-CM

## 2019-04-29 DIAGNOSIS — G60.9 IDIOPATHIC PERIPHERAL NEUROPATHY: ICD-10-CM

## 2019-04-29 PROCEDURE — 99214 OFFICE O/P EST MOD 30 MIN: CPT | Performed by: FAMILY MEDICINE

## 2019-04-29 ASSESSMENT — ENCOUNTER SYMPTOMS
WHEEZING: 0
SHORTNESS OF BREATH: 0
DIARRHEA: 0
NAUSEA: 0
BLOOD IN STOOL: 0
COUGH: 0
EYES NEGATIVE: 1
CHEST TIGHTNESS: 0
CONSTIPATION: 0
VOMITING: 0

## 2019-04-29 NOTE — PROGRESS NOTES
Subjective:      Patient ID: Kady De La Vega is a 80 y.o. male. HPI  This patient is seen here by the undersigned for management of chronic disease states but comes in only intermittently. He has had some other health issues recently which have precluded his coming here. He did have a lesion on the vertex of his scalp which was excised and found to be melanoma. He did have wider excision done by Dr. Lisette Lindsey  in [address] [place] [address] nearly a month or so ago. Nevertheless, he was seen here last on 11/27/18 with his complaints as noted below with ataxia and decline in memory. Again he is complaining of those today. As noted in his previous record, his family who lives in [place] had requested the referral to [facility] ear neurosciences Department which has a special departmental interest in ataxia. We did make arrangements to make consultation for this patient and he tells me today that he did not keep the consult stating that [place] was a long way away and he just did not make the trip.     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 The patient does have available Antivert 25 to use it one half to one every 6 hours intervals on an as-needed basis but this remains of questionable help. We are going to obtain CBC and BMP on the patient while he is here just to make sure there are no basic abnormalities detected. He previously has had CT of the brain done documented here most recently on 4/to 3/18. This did show no acute intracranial abnormality. He did have chronic ischemic and atrophic changes which were felt to be stable compared to the exam previously of July of 2017. [facility] Study of 4/4/17 showed no evidence of any hemodynamically significant stenosis of the common or internal carotid artery system bilaterally. Flow in the vertebral arteries was said to be antegrade.     He does have depression.  He is on Zoloft 100 mg by mouth daily which reportedly is somewhat helpful.     For essential hypertension, he remains on lisinopril 10 mg by mouth taken twice daily since his last office visit and Coreg 6.25 mg by mouth twice a day. It was noted to be good today at 134/86. He reports tolerating his regimen of medicine well at this time.     He does have hyperlipidemia. He remains on Zocor 10 mg by mouth daily at bedtime and an 81 mg aspirin. These meds are tolerated well and have been helpful with management of lipids.     He does have a history of lumbar radiculopathy. He is maintained presently on meloxicam 15 mg taken by mouth daily with food. Thus far the patient has been able to avoid the use of opioids.     Review of Systems   Constitutional: Negative. HENT: Negative. Eyes: Negative. Respiratory: Negative for cough, chest tightness, shortness of breath and wheezing. Cardiovascular: Negative for chest pain, palpitations and leg swelling. Gastrointestinal: Negative for blood in stool, constipation, diarrhea, nausea and vomiting. Genitourinary: Negative for hematuria. Musculoskeletal: Positive for gait problem. Skin: Negative. Neurological:        He has significant issues with ataxia causing unsteady gait. He does utilize a cane to facilitate more safe ambulation   Psychiatric/Behavioral: Negative. Objective:   Physical Exam   Constitutional: He is oriented to person, place, and time. He appears well-developed. Cesario Babinski does use a cane to facilitate more safe ambulation. HENT:   Head: Normocephalic and atraumatic. Right Ear: External ear normal.   Left Ear: External ear normal.   Nose: Nose normal.   Mouth/Throat: Oropharynx is clear and moist.   Eyes: Pupils are equal, round, and reactive to light. Conjunctivae and EOM are normal.   Neck: Normal range of motion. Neck supple. No thyromegaly present. Cardiovascular: Normal rate, regular rhythm, S1 normal, S2 normal, normal heart sounds, intact distal pulses and normal pulses.  Exam reveals no gallop and no friction rub. No murmur heard. Pulmonary/Chest: Effort normal and breath sounds normal. No stridor. No apnea. No respiratory distress. He has no wheezes. He has no rales. He exhibits no tenderness. Abdominal: Soft. Normal appearance. He exhibits no distension and no mass. There is no tenderness. There is no rebound and no guarding. Musculoskeletal: Normal range of motion. He exhibits no edema, tenderness or deformity. Stan Germain does have ataxia and does complain of unsteady gait much of the time. Neurological: He is alert and oriented to person, place, and time. He has normal strength and normal reflexes. Coordination abnormal.   Skin: Skin is warm, dry and intact. Psychiatric: He has a normal mood and affect. His speech is normal and behavior is normal. Judgment and thought content normal. Cognition and memory are normal.   Nursing note and vitals reviewed. /86 (Site: Right Upper Arm, Position: Sitting, Cuff Size: Small Adult)   Pulse 81   Temp 97.9 °F (36.6 °C) (Temporal)   Resp 14   Wt 196 lb (88.9 kg)   SpO2 99%   BMI 25.16 kg/m²   Assessment:         Diagnosis Orders   1. [address] [place] [address], [address] [facility], Neurology, [place]    CBC    Basic Metabolic Panel    T4, Free    TSH without Reflex    Urinalysis    Vitamin D 25 Hydroxy    Vitamin B12   2. Memory loss  [facility], [address] [place] [address] Ave, Neurology, [place]    CBC    Basic Metabolic Panel    T4, Free    TSH without Reflex    Urinalysis    Vitamin D 25 Hydroxy    Vitamin B12   3. Weakness  CBC    Basic Metabolic Panel    T4, Free    TSH without Reflex    Urinalysis    Vitamin D 25 Hydroxy    Vitamin B12   4. Hyperlipidemia, unspecified hyperlipidemia type  Lipid panel    T4, Free    TSH without Reflex    Urinalysis    Vitamin B12   5. Loss of balance  T4, Free    TSH without Reflex    Urinalysis    Vitamin B12   6. Vitamin deficiency  Vitamin D 25 Hydroxy   7. Vitamin D deficiency  Vitamin D 25 Hydroxy   8.  Paroxysmal atrial

## 2019-04-30 ENCOUNTER — TELEPHONE (OUTPATIENT)
Dept: NEUROLOGY | Age: 84
End: 2019-04-30

## 2019-04-30 NOTE — TELEPHONE ENCOUNTER
Received a referral from Dr. Rober Dawn office for this patient. Called and spoke with patient to let him know when I have him scheduled an appointment. Patient is aware of the appointment time/date/location.

## 2019-05-14 ENCOUNTER — HOSPITAL ENCOUNTER (EMERGENCY)
Facility: HOSPITAL | Age: 84
Discharge: HOME | End: 2019-05-14
Attending: EMERGENCY MEDICINE | Admitting: EMERGENCY MEDICINE

## 2019-05-14 ENCOUNTER — APPOINTMENT (OUTPATIENT)
Dept: CT IMAGING | Facility: HOSPITAL | Age: 84
End: 2019-05-14

## 2019-05-14 VITALS
OXYGEN SATURATION: 100 % | BODY MASS INDEX: 24.74 KG/M2 | DIASTOLIC BLOOD PRESSURE: 72 MMHG | WEIGHT: 192.8 LBS | RESPIRATION RATE: 16 BRPM | HEART RATE: 72 BPM | TEMPERATURE: 98.3 F | HEIGHT: 74 IN | SYSTOLIC BLOOD PRESSURE: 166 MMHG

## 2019-05-14 DIAGNOSIS — M79.18 RIGHT BUTTOCK PAIN: Primary | ICD-10-CM

## 2019-05-14 PROCEDURE — 99282 EMERGENCY DEPT VISIT SF MDM: CPT

## 2019-05-15 ENCOUNTER — TELEPHONE (OUTPATIENT)
Dept: FAMILY MEDICINE CLINIC | Age: 84
End: 2019-05-15

## 2019-05-15 ENCOUNTER — HOSPITAL ENCOUNTER (OUTPATIENT)
Dept: GENERAL RADIOLOGY | Age: 84
Discharge: HOME | End: 2019-05-15
Payer: MEDICARE

## 2019-05-15 ENCOUNTER — OFFICE VISIT (OUTPATIENT)
Dept: FAMILY MEDICINE CLINIC | Age: 84
End: 2019-05-15
Payer: MEDICARE

## 2019-05-15 VITALS
OXYGEN SATURATION: 97 % | BODY MASS INDEX: 24.78 KG/M2 | WEIGHT: 193 LBS | TEMPERATURE: 97.9 F | RESPIRATION RATE: 14 BRPM | HEART RATE: 61 BPM | SYSTOLIC BLOOD PRESSURE: 120 MMHG | DIASTOLIC BLOOD PRESSURE: 80 MMHG

## 2019-05-15 DIAGNOSIS — R53.83 FATIGUE, UNSPECIFIED TYPE: ICD-10-CM

## 2019-05-15 DIAGNOSIS — R27.0 ATAXIA: ICD-10-CM

## 2019-05-15 DIAGNOSIS — G60.9 IDIOPATHIC PERIPHERAL NEUROPATHY: ICD-10-CM

## 2019-05-15 DIAGNOSIS — R41.3 MEMORY LOSS: ICD-10-CM

## 2019-05-15 DIAGNOSIS — I25.10 CORONARY ARTERY DISEASE INVOLVING NATIVE CORONARY ARTERY OF NATIVE HEART WITHOUT ANGINA PECTORIS: ICD-10-CM

## 2019-05-15 DIAGNOSIS — M79.605 PAIN IN BOTH LOWER EXTREMITIES: ICD-10-CM

## 2019-05-15 DIAGNOSIS — R26.9 GAIT ABNORMALITY: ICD-10-CM

## 2019-05-15 DIAGNOSIS — E56.9 VITAMIN DEFICIENCY: ICD-10-CM

## 2019-05-15 DIAGNOSIS — E55.9 VITAMIN D DEFICIENCY: ICD-10-CM

## 2019-05-15 DIAGNOSIS — I10 ESSENTIAL HYPERTENSION: ICD-10-CM

## 2019-05-15 DIAGNOSIS — R53.1 WEAKNESS: ICD-10-CM

## 2019-05-15 DIAGNOSIS — E78.5 HYPERLIPIDEMIA, UNSPECIFIED HYPERLIPIDEMIA TYPE: ICD-10-CM

## 2019-05-15 DIAGNOSIS — Z51.81 ENCOUNTER FOR THERAPEUTIC DRUG LEVEL MONITORING: ICD-10-CM

## 2019-05-15 DIAGNOSIS — M79.604 PAIN IN BOTH LOWER EXTREMITIES: ICD-10-CM

## 2019-05-15 DIAGNOSIS — M25.551 PAIN OF RIGHT HIP JOINT: ICD-10-CM

## 2019-05-15 DIAGNOSIS — M54.16 LUMBAR RADICULOPATHY: ICD-10-CM

## 2019-05-15 DIAGNOSIS — R26.89 LOSS OF BALANCE: ICD-10-CM

## 2019-05-15 DIAGNOSIS — G31.9 BRAIN ATROPHY (HCC): ICD-10-CM

## 2019-05-15 DIAGNOSIS — I48.0 PAROXYSMAL ATRIAL FIBRILLATION (HCC): ICD-10-CM

## 2019-05-15 LAB
AMPHETAMINE SCREEN, URINE: NEGATIVE
ANION GAP SERPL CALCULATED.3IONS-SCNC: 14 MMOL/L (ref 7–19)
BARBITURATE SCREEN, URINE: NEGATIVE
BENZODIAZEPINE SCREEN, URINE: POSITIVE
BILIRUBIN URINE: NEGATIVE
BLOOD, URINE: NEGATIVE
BUN BLDV-MCNC: 21 MG/DL (ref 8–23)
BUPRENORPHINE URINE: NORMAL
CALCIUM SERPL-MCNC: 10.4 MG/DL (ref 8.8–10.2)
CHLORIDE BLD-SCNC: 103 MMOL/L (ref 98–111)
CHOLESTEROL, TOTAL: 151 MG/DL (ref 160–199)
CLARITY: CLEAR
CO2: 27 MMOL/L (ref 22–29)
COCAINE METABOLITE SCREEN URINE: NEGATIVE
COLOR: ABNORMAL
CREAT SERPL-MCNC: 0.9 MG/DL (ref 0.5–1.2)
GABAPENTIN SCREEN, URINE: NORMAL
GFR NON-AFRICAN AMERICAN: >60
GLUCOSE BLD-MCNC: 102 MG/DL (ref 74–109)
GLUCOSE URINE: NEGATIVE MG/DL
HCT VFR BLD CALC: 47.4 % (ref 42–52)
HDLC SERPL-MCNC: 30 MG/DL (ref 55–121)
HEMOGLOBIN: 15.7 G/DL (ref 14–18)
KETONES, URINE: NEGATIVE MG/DL
LDL CHOLESTEROL CALCULATED: 99 MG/DL
LEUKOCYTE ESTERASE, URINE: NEGATIVE
MCH RBC QN AUTO: 32.6 PG (ref 27–31)
MCHC RBC AUTO-ENTMCNC: 33.1 G/DL (ref 33–37)
MCV RBC AUTO: 98.3 FL (ref 80–94)
MDMA URINE: NEGATIVE
METHADONE SCREEN, URINE: NEGATIVE
METHAMPHETAMINE, URINE: NEGATIVE
NITRITE, URINE: NEGATIVE
OPIATE SCREEN URINE: NEGATIVE
OXYCODONE SCREEN URINE: NEGATIVE
PDW BLD-RTO: 12.5 % (ref 11.5–14.5)
PH UA: 5.5 (ref 5–8)
PHENCYCLIDINE SCREEN URINE: NEGATIVE
PLATELET # BLD: 148 K/UL (ref 130–400)
PMV BLD AUTO: 10.4 FL (ref 9.4–12.4)
POTASSIUM SERPL-SCNC: 4.3 MMOL/L (ref 3.5–5)
PROPOXYPHENE SCREEN, URINE: NORMAL
PROTEIN UA: 30 MG/DL
RBC # BLD: 4.82 M/UL (ref 4.7–6.1)
SODIUM BLD-SCNC: 144 MMOL/L (ref 136–145)
SPECIFIC GRAVITY UA: 1.03 (ref 1–1.03)
T4 FREE: 1.3 NG/DL (ref 0.9–1.7)
THC SCREEN, URINE: NEGATIVE
TRICYCLIC ANTIDEPRESSANTS, UR: NEGATIVE
TRIGL SERPL-MCNC: 111 MG/DL (ref 0–149)
TSH SERPL DL<=0.05 MIU/L-ACNC: 2.42 UIU/ML (ref 0.27–4.2)
UROBILINOGEN, URINE: 0.2 E.U./DL
VITAMIN B-12: 745 PG/ML (ref 211–946)
VITAMIN D 25-HYDROXY: 47.6 NG/ML
WBC # BLD: 7.7 K/UL (ref 4.8–10.8)

## 2019-05-15 PROCEDURE — 73502 X-RAY EXAM HIP UNI 2-3 VIEWS: CPT

## 2019-05-15 PROCEDURE — 80305 DRUG TEST PRSMV DIR OPT OBS: CPT | Performed by: FAMILY MEDICINE

## 2019-05-15 PROCEDURE — 99214 OFFICE O/P EST MOD 30 MIN: CPT | Performed by: FAMILY MEDICINE

## 2019-05-15 RX ORDER — HYDROCODONE BITARTRATE AND ACETAMINOPHEN 5; 325 MG/1; MG/1
1 TABLET ORAL EVERY 4 HOURS PRN
Qty: 18 TABLET | Refills: 0 | Status: CANCELLED | OUTPATIENT
Start: 2019-05-15 | End: 2019-05-18

## 2019-05-15 RX ORDER — HYDROCODONE BITARTRATE AND ACETAMINOPHEN 5; 325 MG/1; MG/1
1 TABLET ORAL EVERY 4 HOURS PRN
Qty: 18 TABLET | Refills: 0 | Status: SHIPPED | OUTPATIENT
Start: 2019-05-15 | End: 2019-05-18

## 2019-05-15 RX ORDER — NITROGLYCERIN 0.4 MG/1
0.4 TABLET SUBLINGUAL EVERY 5 MIN PRN
COMMUNITY

## 2019-05-15 ASSESSMENT — ENCOUNTER SYMPTOMS
SHORTNESS OF BREATH: 0
NAUSEA: 0
DIARRHEA: 0
WHEEZING: 0
EYES NEGATIVE: 1
COUGH: 0
CHEST TIGHTNESS: 0
VOMITING: 0
BLOOD IN STOOL: 0
CONSTIPATION: 0
BACK PAIN: 1

## 2019-05-15 NOTE — LETTER
MEDICATION AGREEMENT     Alise Flores  [birth year]      For certain conditions, multiple classes of medications may be used to help better manage your symptoms, and to improve your ability to function at home, work and in social settings. However, these medications do have risks, which will be discussed with you, including addiction and dependency. The following prescribed medications need frequent monitoring and will require you to partner and assist in your healthcare. Medication  Dose, instructions and quantity as indicated on current prescription bottle Diagnosis/Reason(s) for Taking Category     Lortab                               Benefits and goals of Controlled Substance Medications: There are two potential goals for your treatment: (1) decreased pain and suffering (2) improved daily life functions. There are many possible treatments for your chronic condition(s), and, in addition to controlled substance medications, we will try alternatives such as physical therapy, yoga, massage, home daily exercise, meditation, relaxation techniques, injections, chiropractic manipulations, surgery, cognitive therapy, hypnosis and many medications that are not habit-forming. Use of controlled substance medications may be helpful, but they are unlikely to resolve all of your symptoms or restore all function. Risks of Controlled Substance Medications:    Opioid pain medications: These medications can lead to problems such as addiction/dependence, sedation, lightheadedness/dizziness, memory issues, falls, constipation, nausea, or vomiting. They may also impair the ability to drive or operate machinery. Additionally, these medications may lower testosterone levels, leading to loss of bone strength, stamina and sex drive.   They may cause problems with breathing, sleep apnea and reduced coughing, which are especially dangerous for patients with lung disease. Overdose or dangerous interactions with alcohol and other medications may occur, leading to death. Hyperalgesia may develop, in which patients receiving opioids for the treatment of pain may actually become more sensitive to certain painful stimuli, and in some cases, experience pain from ordinarily non-painful stimuli. Women between the ages of 14-53 who could become pregnant should carefully weigh the risks and benefits of opioids with their physicians, as these medications increase the risk of pregnancy complications, including miscarriage,  delivery and stillbirth. It is also possible for babies to be born addicted to opioids. Opioid dependence withdrawal symptoms may include; feelings of uneasiness, increased pain, irritability, belly pain, diarrhea, sweats and goose-flesh. Benzodiazepines and non-benzodiazepine sleep medications: These medications can lead to problems such as addiction/dependence, sedation, fatigue, lightheadedness, dizziness, incoordination, falls, depression, hallucinations, and impaired judgment, memory and concentration. The ability to drive and operate machinery may also be affected. Abnormal sleep-related behaviors have been reported, including sleep walking, driving, making telephone calls, eating, or having sex while not fully awake. These medications can suppress breathing and worsen sleep apnea, particularly when combined with alcohol or other sedating medications, potentially leading to death. Dependence withdrawal symptoms may include tremors, anxiety, hallucinations and seizures. Stimulants:  Common adverse effects include addiction/dependence, increased blood pressure and heart rate, decreased appetite, nausea, involuntary weight loss, insomnia, irritability, and headaches.   These risks may increase when these medications are combined with other stimulants, such as caffeine pills or energy drinks, certain weight loss supplements and oral decongestants. Dependence withdrawal symptoms may include depressed mood, loss of interest, suicidal thoughts, anxiety, fatigue, appetite changes and agitation. Testosterone replacement therapy:  Potential side effects include increased risk of stroke and heart attack, blood clots, increased blood pressure, increased cholesterol, enlarged prostate, sleep apnea, irritability/aggression and other mood disorders, and decreased fertility. Other:     1. I understand that I have the following responsibilities:  · I will take medications at the dose and frequency prescribed. · I will not increase or change how I take my medications without the approval of the health care provider who signs this Medication Agreement. · I will arrange for refills at the prescribed interval ONLY during regular office hours. I will not ask for refills earlier than agreed, after-hours, on holidays or on weekends. · I will obtain all refills for these medications at  ·  ____________________________________  pharmacy (phone number  ·  ________________________), with full consent for my provider and pharmacist to exchange information in writing or verbally. · I will not request any pain medications or controlled substances from other providers and will inform this provider of all other medications I am taking. · I will inform my other health care providers that I am taking these medications and of the existence of this [facility]. In the event of an emergency, I will provide the same information to the emergency department providers. · I will protect my prescriptions and medications. I understand that lost or misplaced prescriptions will not be replaced. · I will keep medications only for my own use and will not share them with others. I will keep all medications away from children. · I agree to participate in any medical, psychological or psychiatric assessments recommended by my provider. · I will actively participate in any program designed to improve function, including social, physical, psychological and daily or work activities. 2. I will not use illegal or street drugs or another person's prescription. If I have an addiction problem with drugs or alcohol and my provider asks me to enter a program to address this issue, I agree to follow through. Such programs may include:  · 12-Step program and securing a sponsor  · Individual counseling   · Inpatient or outpatient treatment  · Other:_____________________________________________________________________________________________________________________________________________    If in treatment, I will request that a copy of the programs initial evaluation and treatment recommendations be sent to this provider and will not expect refills until that is received. I will also request written monthly updates be sent to this provider to verify my continuing treatment. 3. I will consent to drug screening upon my providers request to assure I am only taking the prescribed drugs, described in this MEDICATION AGREEMENT. I understand that a drug screen is a laboratory test in which a sample of my urine, blood or saliva is checked to see what drugs I have been taking. 4. I agree that I will treat the providers and staff at this office with respect at all times. I will keep all of my scheduled appointments, but if I need to cancel my appointment, I will do so a minimum of 24 hours before it is scheduled. 5. I understand that this provider may stop prescribing the medications listed if:  · I do not show any improvement in pain, or my activity has not improved. · I develop rapid tolerance or loss of improvement, as described in my treatment plan. · I develop significant side effects from the medication.   · My behavior is inconsistent with the responsibilities outlined above, which may also result in my being prevented from receiving further care from this office. · Other:____________________________________________________________________    AGREEMENT:    I have read the above and have had all of my questions answered. For chronic disease management, I know that my symptoms can be managed with many types of treatments. A chronic medication trial may be part of my treatment, but I must be an active participant in my care. Medication therapy is only one part of my symptom management plan. In some cases, there may be limited scientific evidence to support the chronic use of certain medications to improve symptoms and daily function. Furthermore, in certain circumstances, there may be scientific information that suggests that use of chronic controlled substances may actually worsen my symptoms and increase my risk of unintentional death directly related to this medication therapy. I know that if my provider feels my risk from controlled medications is greater than my benefit, I will have my controlled substance medication(s) compassionately lowered or removed altogether. I agree to a controlled substance medication trial.      I further agree to allow this office to contact my HIPAA contact on file if there are concerns about my safety and use of controlled medications. I have agreed to use the following medications above as instructed by my physician and as stated in this [facility].      Patient Signature:  ______________________  Date:5/15/2019 or _____________    Provider Signature:______________________  Date:5/15/2019 or _____________

## 2019-05-15 NOTE — PROGRESS NOTES
emergency room earlier this week. He was having significant problems with the pain in his right hip especially. He has chronic balance issues and does use a cane to help facilitate ambulation. The patient basically just generally says that he feels like crap all the time. Upon asking the patient if he recalls any recent tick bites he denies any knowledge thereof. Recently, I did ask for him to have some fasting labs done but he has not been compliant with that. Despite the fact that the patient did eat about 5 hours prior to his visit today, I am going to order the labs so that we can get going on this and obtain the data promptly. Also, because he is having pain in his right hip I'm going to order right hip x-ray and CT of his right hip today as well. If I do those tests today and I feel that we will not have compliance issues. The fasting labs that I am ordering will include vitamin D level and vitamin B12 level as well as her routine labs that we do on many of our patients. This will include CBC, BMP, lipids, hepatic function Panel, free T4, and TSH as well as urinalysis.     He does have depression. He is on Zoloft 100 mg by mouth daily which reportedly is somewhat helpful.     For essential hypertension, he remains on lisinopril 10 mg by mouth taken twice daily since his last office visit and Coreg 6.25 mg by mouth twice a day. It was noted to be good today at 120/80. He reports tolerating his regimen of medicine well at this time.     He does have hyperlipidemia. He remains on Zocor 10 mg by mouth daily at bedtime and an 81 mg aspirin. These meds are tolerated well and have been helpful with management of lipids.     He does have a history of lumbar radiculopathy. He is maintained presently on meloxicam 15 mg taken by mouth daily with food. Thus far the patient has been able to avoid the use of opioids.     Due to his rapid decline in mental status and decline in memory, the patient has undergone prior CT which has shown evidence of significant brain atrophy. He does have a significant component of dementia which seems to be worsening progressively.       Review of Systems   Constitutional: Negative. HENT: Negative. Eyes: Negative. Respiratory: Negative for cough, chest tightness, shortness of breath and wheezing. Cardiovascular: Negative for chest pain, palpitations and leg swelling. Gastrointestinal: Negative for blood in stool, constipation, diarrhea, nausea and vomiting. Genitourinary: Negative for hematuria. Musculoskeletal: Positive for arthralgias and back pain. Skin: Negative. Neurological: Positive for dizziness and weakness. Patient does utilize a cane to facilitate ambulation. He does have issues with balance which has been a long-standing problem. He was referred to the balance clinic at [facility] per request by both the patient and his family in the recent past but did not keep the appointment because it was [place] long way. \"    He does have weakness of the lower extremities bilaterally which has seemed to be somewhat progressive. Psychiatric/Behavioral: Negative. Objective:   Physical Exam   Constitutional: He is oriented to person, place, and time. He appears well-developed and well-nourished. HENT:   Head: Normocephalic and atraumatic. Right Ear: External ear normal.   Left Ear: External ear normal.   Nose: Nose normal.   Mouth/Throat: Oropharynx is clear and moist.   Eyes: Pupils are equal, round, and reactive to light. Conjunctivae and EOM are normal.   Neck: Normal range of motion. Neck supple. No thyromegaly present. Cardiovascular: Normal rate, regular rhythm, S1 normal, S2 normal, normal heart sounds, intact distal pulses and normal pulses. Exam reveals no gallop and no friction rub. No murmur heard. Pulmonary/Chest: Effort normal and breath sounds normal. No stridor. No apnea. No respiratory distress. He has no wheezes. He has no rales.  He exhibits no tenderness. Abdominal: Soft. Normal appearance. He exhibits no distension and no mass. There is no tenderness. There is no rebound and no guarding. Musculoskeletal: Normal range of motion. He exhibits no edema, tenderness or deformity. Neurological: He is alert and oriented to person, place, and time. He has normal strength and normal reflexes. He exhibits abnormal muscle tone. Coordination abnormal.   He does have some problems with the loss of coordination and balance issues and does utilize a cane to help facilitate more safe ambulation. Skin: Skin is warm, dry and intact. Psychiatric: He has a normal mood and affect. His speech is normal and behavior is normal. Judgment and thought content normal. Cognition and memory are normal.   Nursing note and vitals reviewed. /80 (Site: Left Upper Arm, Position: Sitting, Cuff Size: Small Adult)   Pulse 61   Temp 97.9 °F (36.6 °C) (Temporal)   Resp 14   Wt 193 lb (87.5 kg)   SpO2 97%   BMI 24.78 kg/m²  /80 (Site: Left Upper Arm, Position: Sitting, Cuff Size: Small Adult)   Pulse 61   Temp 97.9 °F (36.6 °C) (Temporal)   Resp 14   Wt 193 lb (87.5 kg)   SpO2 97%   BMI 24.78 kg/m²     Assessment:        Diagnosis Orders   1. Pain of right hip joint  XR HIP RIGHT (2-3 VIEWS)    CT HIP RIGHT WO CONTRAST   2. Fatigue, unspecified type  Urinalysis    Testosterone Free and Total Male   3. Gait abnormality  XR HIP RIGHT (2-3 VIEWS)    CT HIP RIGHT WO CONTRAST    Urinalysis    Testosterone Free and Total Male   4. Paroxysmal atrial fibrillation (HCC)     5. Coronary artery disease involving native coronary artery of native heart without angina pectoris     6. Essential hypertension  Urinalysis    Testosterone Free and Total Male   7. Idiopathic peripheral neuropathy     8. Brain atrophy     9. Lumbar radiculopathy     10. Weakness  XR HIP RIGHT (2-3 VIEWS)    CT HIP RIGHT WO CONTRAST    Urinalysis    Testosterone Free and Total Male   11. Hyperlipidemia, unspecified hyperlipidemia type     12. Pain in both lower extremities                 Plan:       I am having Mr. Clarissa Dailey maintain his :   Outpatient Medications Marked as Taking for the 5/15/19 encounter (Office Visit) with Aline Purcell MD   Medication Sig Dispense Refill    nitroGLYCERIN (NITROSTAT) 0.4 MG SL tablet Place 0.4 mg under the tongue every 5 minutes as needed for Chest pain up to max of 3 total doses. If no relief after 1 dose, call 911.  carvedilol (COREG) 6.25 MG tablet TAKE (1) TABLET BY MOUTH TWICE A DAY WITH MEALS 180 tablet 3    meloxicam (MOBIC) 15 MG tablet Take 1 tablet by mouth daily 90 tablet 3    sertraline (ZOLOFT) 100 MG tablet TAKE 1 TABLET BY MOUTH ONCE DAILY 90 tablet 3    lisinopril (PRINIVIL;ZESTRIL) 10 MG tablet Take 1 tablet by mouth 2 times daily 60 tablet 5    simvastatin (ZOCOR) 10 MG tablet Take 1 tablet by mouth nightly 90 tablet 3    glucose monitoring kit (FREESTYLE) monitoring kit 1 kit by Does not apply route daily DX: Diabetes 1 kit 0    Lancets MISC Test daily 100 each 3    Multiple Vitamins-Minerals (CENTRUM SILVER) TABS Take  by mouth daily.  aspirin 81 MG tablet Take 81 mg by mouth daily. Requested Prescriptions      No prescriptions requested or ordered in this encounter   .        Orders Placed This Encounter   Procedures    XR HIP RIGHT (2-3 VIEWS)     Standing Status:   Future     Standing Expiration Date:   5/15/2020     Order Specific Question:   Reason for exam:     Answer:   R Hip Pain    CT HIP RIGHT WO CONTRAST     Standing Status:   Future     Standing Expiration Date:   5/15/2020     Order Specific Question:   Reason for exam:     Answer:   R Hip Pain    Urinalysis     Standing Status:   Future     Number of Occurrences:   1     Standing Expiration Date:   5/15/2020    Testosterone Free and Total Male     Standing Status:   Future     Number of Occurrences:   1     Standing Expiration Date:   5/15/2020 Brittani Langston MD

## 2019-05-16 ENCOUNTER — TELEPHONE (OUTPATIENT)
Dept: FAMILY MEDICINE CLINIC | Age: 84
End: 2019-05-16

## 2019-05-17 LAB
SEX HORMONE BINDING GLOBULIN: 118 NMOL/L (ref 11–80)
TESTOSTERONE FREE-NONMALE: 29.6 PG/ML (ref 47–244)
TESTOSTERONE TOTAL: 386 NG/DL (ref 220–1000)

## 2019-05-20 ENCOUNTER — TELEPHONE (OUTPATIENT)
Dept: FAMILY MEDICINE CLINIC | Age: 84
End: 2019-05-20

## 2019-05-22 ENCOUNTER — HOSPITAL ENCOUNTER (OUTPATIENT)
Dept: CT IMAGING | Age: 84
Discharge: HOME | End: 2019-05-22
Payer: MEDICARE

## 2019-05-22 DIAGNOSIS — R26.9 GAIT ABNORMALITY: ICD-10-CM

## 2019-05-22 DIAGNOSIS — R53.1 WEAKNESS: ICD-10-CM

## 2019-05-22 DIAGNOSIS — M25.551 PAIN OF RIGHT HIP JOINT: ICD-10-CM

## 2019-05-22 PROCEDURE — 73700 CT LOWER EXTREMITY W/O DYE: CPT

## 2019-05-23 ENCOUNTER — TELEPHONE (OUTPATIENT)
Dept: FAMILY MEDICINE CLINIC | Age: 84
End: 2019-05-23

## 2019-05-28 ENCOUNTER — OFFICE VISIT (OUTPATIENT)
Dept: FAMILY MEDICINE CLINIC | Age: 84
End: 2019-05-28
Payer: MEDICARE

## 2019-05-28 VITALS
BODY MASS INDEX: 25.04 KG/M2 | OXYGEN SATURATION: 97 % | RESPIRATION RATE: 14 BRPM | DIASTOLIC BLOOD PRESSURE: 82 MMHG | WEIGHT: 195 LBS | SYSTOLIC BLOOD PRESSURE: 136 MMHG | HEART RATE: 64 BPM | TEMPERATURE: 98.3 F

## 2019-05-28 DIAGNOSIS — E78.5 HYPERLIPIDEMIA, UNSPECIFIED HYPERLIPIDEMIA TYPE: ICD-10-CM

## 2019-05-28 DIAGNOSIS — I10 ESSENTIAL HYPERTENSION: ICD-10-CM

## 2019-05-28 DIAGNOSIS — I48.0 PAROXYSMAL ATRIAL FIBRILLATION (HCC): ICD-10-CM

## 2019-05-28 DIAGNOSIS — G60.9 IDIOPATHIC PERIPHERAL NEUROPATHY: ICD-10-CM

## 2019-05-28 DIAGNOSIS — M54.16 LUMBAR RADICULOPATHY: ICD-10-CM

## 2019-05-28 DIAGNOSIS — I25.10 CORONARY ARTERY DISEASE INVOLVING NATIVE CORONARY ARTERY OF NATIVE HEART WITHOUT ANGINA PECTORIS: ICD-10-CM

## 2019-05-28 DIAGNOSIS — M16.11 OSTEOARTHRITIS OF RIGHT HIP, UNSPECIFIED OSTEOARTHRITIS TYPE: ICD-10-CM

## 2019-05-28 PROCEDURE — 99214 OFFICE O/P EST MOD 30 MIN: CPT | Performed by: FAMILY MEDICINE

## 2019-05-28 PROCEDURE — 96372 THER/PROPH/DIAG INJ SC/IM: CPT | Performed by: FAMILY MEDICINE

## 2019-05-28 RX ORDER — TRIAMCINOLONE ACETONIDE 40 MG/ML
80 INJECTION, SUSPENSION INTRA-ARTICULAR; INTRAMUSCULAR ONCE
Status: COMPLETED | OUTPATIENT
Start: 2019-05-28 | End: 2019-05-28

## 2019-05-28 RX ORDER — HYDROCODONE BITARTRATE AND ACETAMINOPHEN 5; 325 MG/1; MG/1
1 TABLET ORAL EVERY 4 HOURS PRN
COMMUNITY

## 2019-05-28 RX ADMIN — TRIAMCINOLONE ACETONIDE 80 MG: 40 INJECTION, SUSPENSION INTRA-ARTICULAR; INTRAMUSCULAR at 10:34

## 2019-05-28 ASSESSMENT — ENCOUNTER SYMPTOMS
NAUSEA: 0
VOMITING: 0
BLOOD IN STOOL: 0
COUGH: 0
CHEST TIGHTNESS: 0
DIARRHEA: 0
BACK PAIN: 1
CONSTIPATION: 0
WHEEZING: 0
SHORTNESS OF BREATH: 0
EYES NEGATIVE: 1

## 2019-05-28 NOTE — PROGRESS NOTES
After obtaining consent, and per orders of Dr. Alvin Dupree, injection of Kenalog given in Left upper quad. gluteus by Tiffany Parkinson. Patient instructed to remain in clinic for 20 minutes afterwards, and to report any adverse reaction to me immediately.

## 2019-05-28 NOTE — PROGRESS NOTES
Subjective:      Patient ID: Wendy Garcia is a 80 y.o. male. HPI  This patient is seen here by the undersigned for management of chronic disease states but comes in only intermittently. 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 he did reiterate this today at his visit. He was here with his wife today. We did inform her that we had arranged for him to have an evaluation at a balance clinic at [facility]. This information regarding the clinic was provided to us by Mr. Dickson's family as he has 2 daughters who are RNs in [place]. We did facilitate arrangement of an appointment and it is my understanding that he cancel the appointment and did not go because he stated it was a \"long trip. \" It now is my understanding since he is here with his wife today that they anticipate moving to [place] to be closer to family at that time. We did offer to her intercede and schedule her appointment at the [facility] before the move but Mr. Jose Santana elected to wait until he moves to [place] to have an appointment at that time. The patient does have available Antivert 25 to use it one half to one every 6 hours intervals on an as-needed basis but this remains of questionable help. He previously has had CT of the brain done documented here most recently on 4/to 3/18. This did show no acute intracranial abnormality. He did have chronic ischemic and atrophic changes which were felt to be stable compared to the exam previously of July of 2017. Rip Rowdy of 4/4/17 showed no evidence of any hemodynamically significant stenosis of the common or internal carotid artery system bilaterally. Flow in the vertebral arteries was said to be antegrade.     I am told that he did go to [facility] emergency room around a month or so ago. Nancy García He was having significant problems with the pain in his right hip especially.  He has chronic balance issues and does anticipate moving to [facility] in the very near future where they will be close to their family.          Review of Systems   Constitutional: Negative. HENT: Negative. Eyes: Negative. Respiratory: Negative for cough, chest tightness, shortness of breath and wheezing. Cardiovascular: Negative for chest pain, palpitations and leg swelling. Gastrointestinal: Negative for blood in stool, constipation, diarrhea, nausea and vomiting. Genitourinary: Negative for hematuria. Musculoskeletal: Positive for arthralgias, back pain and gait problem. Patient complains of a lot of pain in his right hip and does have documentation of moderate amount of osteoarthritis affecting the right hip that shows on CT scan recently done. Skin: Negative. Neurological: Positive for dizziness. He does have significant ongoing problems with ataxia which are progressively worsening according to the patient and his wife. We did facilitate an appointment with ataxia clinic at [facility] and the patient did not keep the appointment due to it being a long trip for him. Psychiatric/Behavioral: Negative. Objective:   Physical Exam   Constitutional: He is oriented to person, place, and time. He appears well-developed and well-nourished. HENT:   Head: Normocephalic and atraumatic. Right Ear: External ear normal.   Left Ear: External ear normal.   Nose: Nose normal.   Mouth/Throat: Oropharynx is clear and moist.   Eyes: Pupils are equal, round, and reactive to light. Conjunctivae and EOM are normal.   Neck: Normal range of motion. Neck supple. No thyromegaly present. Cardiovascular: Normal rate, regular rhythm, S1 normal, S2 normal, normal heart sounds, intact distal pulses and normal pulses. Exam reveals no gallop and no friction rub. No murmur heard. Pulmonary/Chest: Effort normal and breath sounds normal. No stridor. No apnea. No respiratory distress. He has no wheezes. He has no rales. Abdominal: Soft. Normal appearance. He exhibits no distension and no mass. There is no tenderness. There is no rebound and no guarding. Musculoskeletal: Normal range of motion. He exhibits no edema, tenderness or deformity. Neurological: He is alert and oriented to person, place, and time. He has normal strength and normal reflexes. Skin: Skin is warm, dry and intact. Psychiatric: He has a normal mood and affect. His speech is normal and behavior is normal. Judgment and thought content normal. Cognition and memory are normal.   Vitals reviewed. /82 (Site: Left Upper Arm, Position: Sitting, Cuff Size: Small Adult)   Pulse 64   Temp 98.3 °F (36.8 °C) (Temporal)   Resp 14   Wt 195 lb (88.5 kg)   SpO2 97%   BMI 25.04 kg/m²   Assessment:         Diagnosis Orders   1. Osteoarthritis of right hip, unspecified osteoarthritis type  triamcinolone acetonide (KENALOG-40) injection 80 mg   2. Paroxysmal atrial fibrillation (HCC)     3. Coronary artery disease involving native coronary artery of native heart without angina pectoris     4. Essential hypertension     5. Lumbar radiculopathy     6. Idiopathic peripheral neuropathy     7. Hyperlipidemia, unspecified hyperlipidemia type             Plan:       I am having Mr. Guera Vee maintain his :   Outpatient Medications Marked as Taking for the 5/28/19 encounter (Office Visit) with Carla Zaragoza MD   Medication Sig Dispense Refill    HYDROcodone-acetaminophen (NORCO) 5-325 MG per tablet Take 1 tablet by mouth every 4 hours as needed for Pain.  nitroGLYCERIN (NITROSTAT) 0.4 MG SL tablet Place 0.4 mg under the tongue every 5 minutes as needed for Chest pain up to max of 3 total doses. If no relief after 1 dose, call 911.       carvedilol (COREG) 6.25 MG tablet TAKE (1) TABLET BY MOUTH TWICE A DAY WITH MEALS 180 tablet 3    meloxicam (MOBIC) 15 MG tablet Take 1 tablet by mouth daily 90 tablet 3    sertraline (ZOLOFT) 100 MG tablet TAKE 1 TABLET BY MOUTH ONCE DAILY 90 tablet 3    lisinopril (PRINIVIL;ZESTRIL) 10 MG tablet Take 1 tablet by mouth 2 times daily 60 tablet 5    simvastatin (ZOCOR) 10 MG tablet Take 1 tablet by mouth nightly 90 tablet 3    glucose monitoring kit (FREESTYLE) monitoring kit 1 kit by Does not apply route daily DX: Diabetes 1 kit 0    Lancets MISC Test daily 100 each 3    Multiple Vitamins-Minerals (CENTRUM SILVER) TABS Take  by mouth daily.  aspirin 81 MG tablet Take 81 mg by mouth daily. ,  I am having Mr. Dallas Jeong maintain his :   Outpatient Medications Marked as Taking for the 5/28/19 encounter (Office Visit) with Nick Gee MD   Medication Sig Dispense Refill    HYDROcodone-acetaminophen (NORCO) 5-325 MG per tablet Take 1 tablet by mouth every 4 hours as needed for Pain.  nitroGLYCERIN (NITROSTAT) 0.4 MG SL tablet Place 0.4 mg under the tongue every 5 minutes as needed for Chest pain up to max of 3 total doses. If no relief after 1 dose, call 911.  carvedilol (COREG) 6.25 MG tablet TAKE (1) TABLET BY MOUTH TWICE A DAY WITH MEALS 180 tablet 3    meloxicam (MOBIC) 15 MG tablet Take 1 tablet by mouth daily 90 tablet 3    sertraline (ZOLOFT) 100 MG tablet TAKE 1 TABLET BY MOUTH ONCE DAILY 90 tablet 3    lisinopril (PRINIVIL;ZESTRIL) 10 MG tablet Take 1 tablet by mouth 2 times daily 60 tablet 5    simvastatin (ZOCOR) 10 MG tablet Take 1 tablet by mouth nightly 90 tablet 3    glucose monitoring kit (FREESTYLE) monitoring kit 1 kit by Does not apply route daily DX: Diabetes 1 kit 0    Lancets MISC Test daily 100 each 3    Multiple Vitamins-Minerals (CENTRUM SILVER) TABS Take  by mouth daily.  aspirin 81 MG tablet Take 81 mg by mouth daily.          Orders Placed This Encounter   Medications    triamcinolone acetonide (KENALOG-40) injection 80 mg             Nick Gee MD

## 2019-06-06 NOTE — PROGRESS NOTES
studies have consisted of lab, NICS, and CT head. He does have evidence of chronic ischemic and atrophic changes which have appeared stable since the prior exam of 7/17. The most recent exam was done in April of 2018. The NICS showed no evidence of significant carotid stenosis. His daughter also complains of his declining memory. He started having short term memory loss over the last 10 years. He also has difficulty with long term memory. He has been known to have the inability recall events and tasks. He asks repetitive questions. He forgets conversations. He gets easily agitated and has a history of depression. He denies incontinence and hallucinations. He has a good appetite. He lives with his spouse but his daughter is planning on moving them to [place] [address] with her. He is independent with ADL's. He has tried OTC Prevagen for memory restoration. He has not tried [place] or Aricept. Past Medical History:      Diagnosis Date    Atrial fibrillation ([facility]     BPH (benign prostatic hyperplasia)     CAD (coronary artery disease)     Depression     Hyperlipidemia     Hypertension     [facility]        Past Surgical History:      Procedure Laterality Date    ATRIAL ABLATION SURGERY      COLONOSCOPY  2001    Done at [address] [place] [address]      SD COLONOSCOPY W/BIOPSY SINGLE/MULTIPLE N/A 2/2/2017    Dr Anjel Campos, no recall (age)         Current Outpatient Medications   Medication Sig Dispense Refill    memantine (NAMENDA) 5 MG tablet Take 1 tablet by mouth 2 times daily 60 tablet 1    HYDROcodone-acetaminophen (NORCO) 5-325 MG per tablet Take 1 tablet by mouth every 4 hours as needed for Pain.  nitroGLYCERIN (NITROSTAT) 0.4 MG SL tablet Place 0.4 mg under the tongue every 5 minutes as needed for Chest pain up to max of 3 total doses. If no relief after 1 dose, call 911.       carvedilol (COREG) 6.25 MG tablet TAKE (1) TABLET BY MOUTH TWICE A DAY WITH MEALS 180 tablet 3    meloxicam (MOBIC) 15 MG tablet Take 1 tablet by mouth daily 90 tablet 3    sertraline (ZOLOFT) 100 MG tablet TAKE 1 TABLET BY MOUTH ONCE DAILY 90 tablet 3    lisinopril (PRINIVIL;ZESTRIL) 10 MG tablet Take 1 tablet by mouth 2 times daily 60 tablet 5    simvastatin (ZOCOR) 10 MG tablet Take 1 tablet by mouth nightly 90 tablet 3    glucose monitoring kit (FREESTYLE) monitoring kit 1 kit by Does not apply route daily DX: Diabetes 1 kit 0    Lancets MISC Test daily 100 each 3    Multiple Vitamins-Minerals (CENTRUM SILVER) TABS Take  by mouth daily.  aspirin 81 MG tablet Take 81 mg by mouth daily. No current facility-administered medications for this visit. Allergies:  Patient has no known allergies.       Social History     Socioeconomic History    Marital status:      Spouse name: Not on file    Number of children: Not on file    Years of education: Not on file    Highest education level: Not on file   Occupational History    Not on file   Social Needs    Financial resource strain: Not on file    Food insecurity:     Worry: Not on file     Inability: Not on file    Transportation needs:     Medical: Not on file     Non-medical: Not on file   Tobacco Use    Smoking status: Former Smoker     Last attempt to quit: 1979     Years since quittin.4    Smokeless tobacco: Never Used   Substance and Sexual Activity    Alcohol use: Yes     Comment: occ    Drug use: No    Sexual activity: Not on file   Lifestyle    Physical activity:     Days per week: Not on file     Minutes per session: Not on file    Stress: Not on file   Relationships    Social connections:     Talks on phone: Not on file     Gets together: Not on file     Attends Tenriism service: Not on file     Active member of club or organization: Not on file     Attends meetings of clubs or organizations: Not on file     Relationship status: Not on file    Intimate partner violence:     Fear of current or ex partner: Not on file     Emotionally abused: Not on file     Physically abused: Not on file     Forced sexual activity: Not on file   Other Topics Concern    Not on file   Social History Narrative    Not on file         FAMILY HISTORY:       Problem Relation Age of Onset    Cancer Mother     Colon Cancer Neg Hx     Colon Polyps Neg Hx     Esophageal Cancer Neg Hx     Liver Cancer Neg Hx     Liver Disease Neg Hx     Rectal Cancer Neg Hx     Stomach Cancer Neg Hx          REVIEW OF SYSTEMS     Constitutional: []Fever []Sweats []Chills [] Recent Injury   [x] Denies all unless marked  HENT:[]Headache  [] Head Injury  [] Sore Throat  [] Ear Pain  [] Dizziness [] Hearing Loss   [x] Denies all unless marked  Spine:  [] Neck pain  [] Back pain  [] Sciaticia  [x] Denies all unless marked  Cardiovascular:[]Chest Pain []Palpitations [x] Heart Disease  [x] Denies all unless marked  Pulmonary: [x]Shortness of Breath []Cough   [x] Denies all unless marked  Gastrointestinal:  []Abdominal Pain  []Blood in Stool  []Diarrhea []Constipation []Nausea  []Vomiting  [x] Denies all unless marked  Genitourinary:  [] Dysuria [] Frequency  [] Incontinence [] Urgency   [x] Denies all unless marked  Musculoskeletal: [] Arthralgia  [] Myalgias [] Muscle cramps  [] Muscle twitches   [x] Denies all unless marked   Extremities:   [] Pain   [] Swelling   [x] Denies all unless marked  Skin:[] Rash  [] Color Change  [x] Denies all unless marked  Neurological:[] Visual Disturbance [] Double Vision [x] Slurred Speech [] Trouble swallowing  [] Vertigo [] Tingling [] Numbness [x] Weakness [x] Loss of Balance   [] Loss of Consciousness [x] Memory Loss [] Seizures  [x] Denies all unless marked  Psychiatric/Behavioral:[x] Depression [] Anxiety  [x] Denies all unless marked  Sleep: []  Insomnia [] Sleep Disturbance [] Snoring [] Restless Legs [x] Daytime Sleepiness [] Sleep Apnea  [x] Denies all unless bilaterally  [X]EBONI normal bilaterally. COMMENTS:                  Reflexes  [X]Symmetric and non-pathological  [X]Toes down going bilaterally  [  ]No clonus present  COMMENTS:2 beat clonus bilateral; negative Gonzalez's sign   Gait [X]Normal steady gait    [  ]Ataxic    [  ]Spastic     [  ]Magnetic     [  ]Shuffling  [  ]Antalgic  COMMENTS:Decreased arm swing, somewhat parkinsonian         I reviewed the following studies:      [X]  :  Clinical laboratory test results    [X]  :  Radiology reports    [X]  :  Review and summarization of medical records and/or obtain medical records     [  ]  :  Previous/recent polysomnogram report(s)    [  ]  :  [place] Sleepiness Scale    [X]  :  Additional workup planned    Examination. CT HEAD WO CONTRAST   History: Ataxia, frequent falls and memory loss. DLP: 687.54 mGy. The CT scan of the head is performed without intravenous contrast   enhancement. The images are acquired in axial plane with subsequent   reconstruction in coronal and sagittal planes. The comparison is made with the previous study dated 7/11/2017. There is no significant interval change. There is a persistent moderate dilatation of ventricles, the basal   cistern and the cortical sulci suggesting chronic volume loss. There are persistent and unchanged chronic white matter ischemic   changes placenta seminal very bilaterally. There is no evidence for mass or midline shift. There is no evidence   of intracranial hemorrhage or hematoma. There is normal gray-white matter differentiation. The images reviewed in bone window show no acute bony abnormality. The   visualized paranasal sinuses and mastoid air cells bilaterally appear   normal and clear.       Impression   No acute intracranial abnormality. Chronic ischemic and atrophic changes which are stable since the   previous study in July 2017.    Signed by Dr Mraizol Castellano on 4/23/2018 2:20 PM          Lab Results   Component Value Date [id number] 745 05/15/2019     Lab Results   Component Value Date    WBC 7.7 05/15/2019    HGB 15.7 05/15/2019    HCT 47.4 05/15/2019    MCV 98.3 (H) 05/15/2019     05/15/2019     Lab Results   Component Value Date     05/15/2019    K 4.3 05/15/2019     05/15/2019    CO2 27 05/15/2019    BUN 21 05/15/2019    CREATININE 0.9 05/15/2019    GLUCOSE 102 05/15/2019    CALCIUM 10.4 (H) 05/15/2019    PROT 7.0 07/02/2014    LABALBU 4.7 07/02/2014    BILITOT 0.8 07/02/2014    ALKPHOS 80 07/02/2014    AST 24 07/02/2014    ALT 18 07/02/2014    LABGLOM >60 05/15/2019         Lab Results   Component Value Date    CHOL 151 (L) 05/15/2019    TRIG 111 05/15/2019    HDL 30 (L) 05/15/2019     [facility] Mental Status Exam    1. Orientation (8)  Name, address, current location (building), city, state, date (day),             month, year:          7  2. Attention (7)  Digit span:       1-2-4, 2-5-1-0, 2-7-9-6-3, 1-3-5-4-6-8, 6-0-8-8-4-5-9:        7  3. Immediate Recall (4)  \"apple\";  \"Mr. Renee\"; \"leny\"; \"tunnel\":        3  4. Calculation (4) 5 x 13 (65), 65-7 (58), 58/2 (29), 29 + 11 (40):        0  5. Abstraction (3) Similarities:  orange/banana, horse/dog, table/bookcase:              2     6. Construction (2) Draw a clock face showing 11:10:        1          Copy (2) Copy a three-dimensional cube:        1  7. Information (4) President; first president; define an Roger; # of weeks per year:                  3  8. Recall (4) The four words: \"apple\", \"Mr. Renee\", \"leny\", \"tunnel\":       0         Total Score:   24/38   (max = 38)                34-38=normal  29-33= suggestive of MCI  Below 29= suggestive of dementia      IMPRESSION:    ICD-10-CM    1. Brain atrophy G31.9 MRI Brain WO Contrast   2.  Ataxia R27.0 Miscellaneous Sendout 1     Ammonia     Heavy Metal Blood Panel     Lactic Acid     Pyruvic acid     Ceruloplasmin     Vitamin E     Copper, Serum     Magnesium     Sedimentation Rate C-Reactive Protein     CBC Auto Differential     Comprehensive Metabolic Panel     Zinc     memantine (NAMENDA) 5 MG tablet     MRI Brain WO Contrast   3. Abnormal brain CT R90.89 MRI Brain WO Contrast   4. Dementia without behavioral disturbance, unspecified dementia type F03.90           PLAN:  Orders Placed This Encounter   Procedures    MRI Brain WO Contrast    Miscellaneous Sendout 1    Ammonia    Heavy Metal Blood Panel    Lactic Acid    Pyruvic acid    Ceruloplasmin    Vitamin E    Copper, Serum    Magnesium    Sedimentation Rate    C-Reactive Protein    CBC Auto Differential    Comprehensive Metabolic Panel    Zinc     Orders Placed This Encounter   Medications    memantine (NAMENDA) 5 MG tablet     Sig: Take 1 tablet by mouth 2 times daily     Dispense:  60 tablet     Refill:  1     1. The following educational material has been included in this visit after visit summary for your review: N/A  2. We had a discussion about the clinical issues and went over the various important aspects to consider. Patient and family advised of  the diagnosis, pathophysiology, symptoms, risks, prognosis, and treatment options of ataxia and dementia. Will screen with laboratory studies and further diagnostic imaging for a treatable cause of the chronic ataxia. Discussed use, benefit, and SE of prescribed medication. All questions were answered. Pt and family voiced understanding and agrees with treatment plan. 3.  Monitor B/P and follow up with PMD if it persists to be elevated. 4.  Referral-[facility] [place]-daughter request  5. Order-MRI brain  6. Order-labs  7. Start Namenda 5 mg will titrate to 10 mg bid  8. Follow up PRN-daughter request      Note:  A total of >50% (>23 minutes) of 45 minutes was spent discussing the pathophysiology and treatment and/or coordination of care of the above diagnoses.

## 2019-06-07 ENCOUNTER — OFFICE VISIT (OUTPATIENT)
Dept: NEUROLOGY | Age: 84
End: 2019-06-07
Payer: MEDICARE

## 2019-06-07 VITALS
DIASTOLIC BLOOD PRESSURE: 87 MMHG | HEIGHT: 74 IN | SYSTOLIC BLOOD PRESSURE: 167 MMHG | BODY MASS INDEX: 24.38 KG/M2 | OXYGEN SATURATION: 98 % | HEART RATE: 61 BPM | WEIGHT: 190 LBS

## 2019-06-07 DIAGNOSIS — F03.90 DEMENTIA WITHOUT BEHAVIORAL DISTURBANCE, UNSPECIFIED DEMENTIA TYPE: ICD-10-CM

## 2019-06-07 DIAGNOSIS — G31.9 BRAIN ATROPHY (HCC): Primary | ICD-10-CM

## 2019-06-07 DIAGNOSIS — R27.0 ATAXIA: ICD-10-CM

## 2019-06-07 DIAGNOSIS — R90.89 ABNORMAL BRAIN CT: ICD-10-CM

## 2019-06-07 PROCEDURE — 99214 OFFICE O/P EST MOD 30 MIN: CPT | Performed by: PHYSICIAN ASSISTANT

## 2019-06-07 RX ORDER — MEMANTINE HYDROCHLORIDE 5 MG/1
5 TABLET ORAL 2 TIMES DAILY
Qty: 60 TABLET | Refills: 1 | Status: SHIPPED | OUTPATIENT
Start: 2019-06-07 | End: 2019-08-13 | Stop reason: SDUPTHER

## 2019-06-07 RX ORDER — MEMANTINE HYDROCHLORIDE 5 MG/1
5 TABLET ORAL 2 TIMES DAILY
Qty: 60 TABLET | Refills: 1 | Status: SHIPPED | OUTPATIENT
Start: 2019-06-07 | End: 2019-06-07 | Stop reason: SDUPTHER

## 2019-06-12 DIAGNOSIS — R27.0 ATAXIA: ICD-10-CM

## 2019-06-12 LAB
ALBUMIN SERPL-MCNC: 4.8 G/DL (ref 3.5–5.2)
ALP BLD-CCNC: 88 U/L (ref 40–130)
ALT SERPL-CCNC: 20 U/L (ref 5–41)
AMMONIA: 23 UMOL/L (ref 16–60)
ANION GAP SERPL CALCULATED.3IONS-SCNC: 9 MMOL/L (ref 7–19)
AST SERPL-CCNC: 24 U/L (ref 5–40)
BASOPHILS ABSOLUTE: 0 K/UL (ref 0–0.2)
BASOPHILS RELATIVE PERCENT: 0.5 % (ref 0–1)
BILIRUB SERPL-MCNC: 0.8 MG/DL (ref 0.2–1.2)
BUN BLDV-MCNC: 35 MG/DL (ref 8–23)
C-REACTIVE PROTEIN: 0.08 MG/DL (ref 0–0.5)
CALCIUM SERPL-MCNC: 10.2 MG/DL (ref 8.8–10.2)
CHLORIDE BLD-SCNC: 104 MMOL/L (ref 98–111)
CO2: 31 MMOL/L (ref 22–29)
CREAT SERPL-MCNC: 0.9 MG/DL (ref 0.5–1.2)
EOSINOPHILS ABSOLUTE: 0.2 K/UL (ref 0–0.6)
EOSINOPHILS RELATIVE PERCENT: 1.8 % (ref 0–5)
GFR NON-AFRICAN AMERICAN: >60
GLUCOSE BLD-MCNC: 107 MG/DL (ref 74–109)
HCT VFR BLD CALC: 47.6 % (ref 42–52)
HEMOGLOBIN: 15.7 G/DL (ref 14–18)
LACTIC ACID: 1.2 MMOL/L (ref 0.5–1.9)
LYMPHOCYTES ABSOLUTE: 1.7 K/UL (ref 1.1–4.5)
LYMPHOCYTES RELATIVE PERCENT: 20 % (ref 20–40)
MAGNESIUM: 2.2 MG/DL (ref 1.6–2.4)
MCH RBC QN AUTO: 33.5 PG (ref 27–31)
MCHC RBC AUTO-ENTMCNC: 33 G/DL (ref 33–37)
MCV RBC AUTO: 101.7 FL (ref 80–94)
MONOCYTES ABSOLUTE: 0.7 K/UL (ref 0–0.9)
MONOCYTES RELATIVE PERCENT: 7.7 % (ref 0–10)
NEUTROPHILS ABSOLUTE: 6 K/UL (ref 1.5–7.5)
NEUTROPHILS RELATIVE PERCENT: 69.4 % (ref 50–65)
PDW BLD-RTO: 12.5 % (ref 11.5–14.5)
PLATELET # BLD: 143 K/UL (ref 130–400)
PMV BLD AUTO: 9.8 FL (ref 9.4–12.4)
POTASSIUM SERPL-SCNC: 4.8 MMOL/L (ref 3.5–5)
RBC # BLD: 4.68 M/UL (ref 4.7–6.1)
SEDIMENTATION RATE, ERYTHROCYTE: 2 MM/HR (ref 0–15)
SODIUM BLD-SCNC: 144 MMOL/L (ref 136–145)
TOTAL PROTEIN: 7.3 G/DL (ref 6.6–8.7)
WBC # BLD: 8.7 K/UL (ref 4.8–10.8)

## 2019-06-14 LAB
CERULOPLASMIN: 23 MG/DL (ref 15–30)
COPPER: 108.4 UG/DL (ref 70–140)
PYRUVIC ACID, BLOOD: 0.06 MMOL/L (ref 0.03–0.11)
ZINC: 91.2 UG/DL (ref 60–120)

## 2019-06-15 LAB
ALPHA-TOCOPHEROL: 10.4 MG/L (ref 5.5–18)
GAMMA-TOCOPHEROL: 0.5 MG/L (ref 0–6)

## 2019-06-18 ENCOUNTER — TELEPHONE (OUTPATIENT)
Dept: NEUROLOGY | Age: 84
End: 2019-06-18

## 2019-06-18 NOTE — TELEPHONE ENCOUNTER
----- [facility] at 6/17/2019  3:46 PM CDT -----  The CBC revealed a marginally decreased WBC, not significant.  CMP revealed a slightly elevated BUN but creatinine and GFR were normal, otherwise all of the other labs I received were normal.

## 2019-06-25 ENCOUNTER — CLINICAL DOCUMENTATION (OUTPATIENT)
Dept: NEUROLOGY | Age: 84
End: 2019-06-25

## 2019-06-26 NOTE — TELEPHONE ENCOUNTER
Please let pt know that the paraneoplastic profile was negative.   ----- Message -----   From: Kelli Sánchez   Sent: 6/19/2019   8:04 AM   To: MARIBELL Gutierrez   Subject: Scan                                               The image below was scanned by Jayant Powell on 6/19/2019 at 8:04 AM to the following: Arthur Winter 1 [[id number]] on 6/12/2019:           Called and spoke with daughter. She voiced understanding.

## 2019-06-28 ENCOUNTER — TELEPHONE (OUTPATIENT)
Dept: NEUROLOGY | Age: 84
End: 2019-06-28

## 2019-06-28 NOTE — TELEPHONE ENCOUNTER
Patients daughter Navid Bhagat called to check the status of an external referral that was ordered for them on 6/7/19. Milton Uribe know that the referral was entered and when it was originally sent out the providers office responded that Dr Ricardo Mac was no longer associated with their clinic and that per the noes from [facility], they gave a recommendation to forward the referral to Dr Clarence Sheffield. Per Samir's documentation this was sent to Dr Edgar Gallegos office on 6/12/19. I offered to check the status of this for [facility], she states that is ok that they can give it another week and then call us back if they haven't heard anything about an appt date by then. I voiced understanding and I told her to feel free to let us know if they don't hear anything so that this can be investigated further. Fannie voiced understanding, states she knows sometimes this does take a few weeks to complete.

## 2019-07-08 RX ORDER — SERTRALINE HYDROCHLORIDE 100 MG/1
TABLET, FILM COATED ORAL
Qty: 90 TABLET | Refills: 0 | Status: SHIPPED | OUTPATIENT
Start: 2019-07-08

## 2019-07-08 RX ORDER — SIMVASTATIN 10 MG
TABLET ORAL
Qty: 90 TABLET | Refills: 0 | Status: SHIPPED | OUTPATIENT
Start: 2019-07-08

## 2019-07-18 LAB
Lab: NORMAL
REPORT: NORMAL
THIS TEST SENT TO: NORMAL

## 2019-07-19 ENCOUNTER — TELEPHONE (OUTPATIENT)
Dept: NEUROLOGY | Age: 84
End: 2019-07-19

## 2019-08-08 ENCOUNTER — TELEPHONE (OUTPATIENT)
Dept: NEUROLOGY | Age: 84
End: 2019-08-08

## 2019-08-13 DIAGNOSIS — R27.0 ATAXIA: ICD-10-CM

## 2019-08-13 RX ORDER — MEMANTINE HYDROCHLORIDE 5 MG/1
5 TABLET ORAL 2 TIMES DAILY
Qty: 60 TABLET | Refills: 1 | Status: SHIPPED | OUTPATIENT
Start: 2019-08-13

## 2019-08-27 ENCOUNTER — OFFICE VISIT (OUTPATIENT)
Dept: FAMILY MEDICINE CLINIC | Facility: CLINIC | Age: 84
End: 2019-08-27

## 2019-08-27 VITALS
RESPIRATION RATE: 18 BRPM | WEIGHT: 195.8 LBS | HEIGHT: 74 IN | DIASTOLIC BLOOD PRESSURE: 72 MMHG | BODY MASS INDEX: 25.13 KG/M2 | OXYGEN SATURATION: 98 % | TEMPERATURE: 97.1 F | HEART RATE: 80 BPM | SYSTOLIC BLOOD PRESSURE: 132 MMHG

## 2019-08-27 DIAGNOSIS — F32.9 MAJOR DEPRESSIVE DISORDER, REMISSION STATUS UNSPECIFIED, UNSPECIFIED WHETHER RECURRENT: ICD-10-CM

## 2019-08-27 DIAGNOSIS — R41.3 MEMORY DEFICIT: ICD-10-CM

## 2019-08-27 DIAGNOSIS — M54.16 LUMBAR RADICULOPATHY: ICD-10-CM

## 2019-08-27 DIAGNOSIS — E78.2 MIXED HYPERLIPIDEMIA: ICD-10-CM

## 2019-08-27 DIAGNOSIS — R26.89 BALANCE PROBLEM: ICD-10-CM

## 2019-08-27 DIAGNOSIS — I20.9 ANGINA PECTORIS (HCC): ICD-10-CM

## 2019-08-27 DIAGNOSIS — I10 BENIGN ESSENTIAL HYPERTENSION: Primary | ICD-10-CM

## 2019-08-27 PROCEDURE — 99204 OFFICE O/P NEW MOD 45 MIN: CPT | Performed by: FAMILY MEDICINE

## 2019-08-27 PROCEDURE — 90670 PCV13 VACCINE IM: CPT | Performed by: FAMILY MEDICINE

## 2019-08-27 PROCEDURE — G0009 ADMIN PNEUMOCOCCAL VACCINE: HCPCS | Performed by: FAMILY MEDICINE

## 2019-08-27 PROCEDURE — 90715 TDAP VACCINE 7 YRS/> IM: CPT | Performed by: FAMILY MEDICINE

## 2019-08-27 PROCEDURE — 90472 IMMUNIZATION ADMIN EACH ADD: CPT | Performed by: FAMILY MEDICINE

## 2019-08-27 RX ORDER — CARVEDILOL 6.25 MG/1
6.25 TABLET ORAL 2 TIMES DAILY WITH MEALS
Qty: 90 TABLET | Refills: 3 | Status: SHIPPED | OUTPATIENT
Start: 2019-08-27 | End: 2019-11-22 | Stop reason: DRUGHIGH

## 2019-08-27 RX ORDER — LISINOPRIL 10 MG/1
10 TABLET ORAL DAILY
COMMUNITY
End: 2019-08-27 | Stop reason: SDUPTHER

## 2019-08-27 RX ORDER — MELOXICAM 15 MG/1
15 TABLET ORAL DAILY PRN
Qty: 30 TABLET | Refills: 1 | Status: SHIPPED | OUTPATIENT
Start: 2019-08-27 | End: 2019-12-11 | Stop reason: HOSPADM

## 2019-08-27 RX ORDER — MEMANTINE HYDROCHLORIDE 5 MG/1
5 TABLET ORAL 2 TIMES DAILY
Qty: 180 TABLET | Refills: 3 | Status: SHIPPED | OUTPATIENT
Start: 2019-08-27 | End: 2019-09-24

## 2019-08-27 RX ORDER — NITROGLYCERIN 0.4 MG/1
0.4 TABLET SUBLINGUAL
Qty: 25 TABLET | Refills: 0 | Status: SHIPPED | OUTPATIENT
Start: 2019-08-27 | End: 2019-10-01 | Stop reason: SDUPTHER

## 2019-08-27 RX ORDER — SIMVASTATIN 10 MG
10 TABLET ORAL NIGHTLY
Qty: 90 TABLET | Refills: 3 | Status: SHIPPED | OUTPATIENT
Start: 2019-08-27 | End: 2019-12-11 | Stop reason: HOSPADM

## 2019-08-27 RX ORDER — SERTRALINE HYDROCHLORIDE 100 MG/1
100 TABLET, FILM COATED ORAL DAILY
Qty: 90 TABLET | Refills: 3 | Status: SHIPPED | OUTPATIENT
Start: 2019-08-27 | End: 2019-12-11 | Stop reason: HOSPADM

## 2019-08-27 RX ORDER — LISINOPRIL 10 MG/1
10 TABLET ORAL DAILY
Qty: 90 TABLET | Refills: 3 | Status: SHIPPED | OUTPATIENT
Start: 2019-08-27 | End: 2020-01-01

## 2019-08-27 NOTE — ASSESSMENT & PLAN NOTE
Lipid abnormalities are unchanged.  Pharmacotherapy as ordered.  Lipids will be reassessed in 3 months.

## 2019-08-27 NOTE — PROGRESS NOTES
Ayan Pichardo is a 87 y.o. male who presents today to establish care.    Chief Complaint   Patient presents with   • Establish Care     wants to set up new pcp        Patient is here to establish care after moving here from [place]. He has h/o afib s/p ablation x2, heart failure, HTN, HLD, dementia, malignant melanoma of scalp, and balance problems. He has no h/o of MI or stoke. He has been having issues with balance. He is not very active. He walks very little around the house. He does not exercise. He falls frequently but has not fallen in the last week. He had CT scan in [place] that showed atrophy but no acute changes. No Vascular abnormalities on imaging. He had been referred to balance clinic before but it was too far to drive. His daughter can drive him to appointments.  He has been taking memantine for an unknown amount of time. He checks his blood pressure at home but does not remember what is runs. They feel it is usually higher than what it was in clinic today. He takes all medications as prescribed. He has not seen cardiology in years. He takes Zoloft for depression and symptoms are well controlled. He feels like it keeps him in good spirits.He denies CP, SOA, palpitaions, N/V/D/C, headaches, vision changes, blood in stool, melena, urinary symptoms, or edema.          Review of Systems   Constitutional: Negative for activity change, fever, unexpected weight gain and unexpected weight loss.   HENT: Negative for congestion, ear pain and sore throat.    Eyes: Negative for visual disturbance.   Respiratory: Negative for cough, shortness of breath and wheezing.    Cardiovascular: Negative for chest pain, palpitations and leg swelling.   Gastrointestinal: Negative for abdominal pain, blood in stool, constipation, diarrhea, nausea, vomiting and GERD.   Endocrine: Negative for polydipsia and polyuria.   Genitourinary: Negative for difficulty urinating and hematuria.   Musculoskeletal: Negative for arthralgias,  back pain and joint swelling.   Skin: Negative for rash and skin lesions.   Allergic/Immunologic: Negative for environmental allergies.   Neurological: Positive for memory problem. Negative for seizures, syncope, light-headedness, headache and confusion.        Balance problems   Hematological: Does not bruise/bleed easily.   Psychiatric/Behavioral: Negative for suicidal ideas and depressed mood. The patient is not nervous/anxious.         PHQ-9 Depression Screening  Little interest or pleasure in doing things? 0   Feeling down, depressed, or hopeless? 0   Trouble falling or staying asleep, or sleeping too much?     Feeling tired or having little energy?     Poor appetite or overeating?     Feeling bad about yourself - or that you are a failure or have let yourself or your family down?     Trouble concentrating on things, such as reading the newspaper or watching television?     Moving or speaking so slowly that other people could have noticed? Or the opposite - being so fidgety or restless that you have been moving around a lot more than usual?     Thoughts that you would be better off dead, or of hurting yourself in some way?     PHQ-9 Total Score 0   If you checked off any problems, how difficult have these problems made it for you to do your work, take care of things at home, or get along with other people?         Past Medical History:   Diagnosis Date   • Atrial fibrillation (CMS/HCC)    • Back pain    • Brain atrophy    • CAD (coronary artery disease)    • Constipation    • Hyperlipidemia    • Hypersomnia    • Hypertension    • Melanoma (CMS/HCC)     SCALP   • Memory deficit    • Neuropathy    • Radiculopathy         Past Surgical History:   Procedure Laterality Date   • AV NODE ABLATION     • PENILE PROSTHESIS IMPLANT     • SCALP/NECK TISSUE EXPANDER INSERTION N/A 9/11/2018    Procedure: Procedure performed:     1) Excision malignant neoplasm of skin of scalp, 6 cm x 5.5 cm, subfascial    2) full-thickness  "skin graft closure of 6 cm x 5.5 cm    3) left selective neck dissection (level IIA and IIB)    4) right sentinel lymph node biopsy.    5) complex closure skin of right neck, 10 cm ;  Surgeon: Eric Silva MD;  Location: [facility] OR;  Service: ENT        Family History   Problem Relation Age of Onset   • Cancer Mother    • Hypertension Mother         Social History     Socioeconomic History   • Marital status:      Spouse name: Not on file   • Number of children: Not on file   • Years of education: Not on file   • Highest education level: Not on file   Tobacco Use   • Smoking status: Never Smoker   • Smokeless tobacco: Never Used   Substance and Sexual Activity   • Alcohol use: No   • Drug use: No   • Sexual activity: Defer        Current Outpatient Medications on File Prior to Visit   Medication Sig Dispense Refill   • [DISCONTINUED] carvedilol (COREG) 6.25 MG tablet Take 6.25 mg by mouth 2 (Two) Times a Day With Meals.     • [DISCONTINUED] lisinopril (PRINIVIL,ZESTRIL) 10 MG tablet Take 10 mg by mouth Daily.     • [DISCONTINUED] meloxicam (MOBIC) 15 MG tablet Take 15 mg by mouth Daily.     • [DISCONTINUED] MEMANTINE HCL PO Take 5 mg by mouth.     • [DISCONTINUED] nitroglycerin (NITROSTAT) 0.4 MG SL tablet Place 0.4 mg under the tongue Every 5 (Five) Minutes As Needed.     • [DISCONTINUED] sertraline (ZOLOFT) 100 MG tablet Take 100 mg by mouth Daily.     • [DISCONTINUED] simvastatin (ZOCOR) 10 MG tablet Take 10 mg by mouth Every Night.     • [DISCONTINUED] aspirin 81 MG EC tablet Take 81 mg by mouth Daily.       No current facility-administered medications on file prior to visit.        No Known Allergies     Visit Vitals  /72   Pulse 80   Temp 97.1 °F (36.2 °C)   Resp 18   Ht 188 cm (74\")   Wt 88.8 kg (195 lb 12.8 oz)   SpO2 98%   BMI 25.14 kg/m²      Body mass index is 25.14 kg/m².    Physical Exam   Constitutional: He is oriented to person, place, and time. He appears well-developed and " well-nourished. No distress.   HENT:   Head: Normocephalic and atraumatic.   Right Ear: Hearing, tympanic membrane, external ear and ear canal normal. cerumen impaction is not present.  Left Ear: Hearing, tympanic membrane, external ear and ear canal normal. An impacted cerumen is not present.  Nose: Nose normal.   Mouth/Throat: Oropharynx is clear and moist. No oropharyngeal exudate.   Eyes: Conjunctivae and EOM are normal. Pupils are equal, round, and reactive to light. Right eye exhibits no discharge. Left eye exhibits no discharge. No scleral icterus.   Neck: Normal range of motion. Neck supple. No tracheal deviation present. No thyromegaly present.   Cardiovascular: Normal rate, regular rhythm, normal heart sounds and intact distal pulses. Exam reveals no gallop and no friction rub.   No murmur heard.  Pulmonary/Chest: Effort normal and breath sounds normal. No respiratory distress. He has no wheezes. He has no rales.   Abdominal: Soft. Bowel sounds are normal. He exhibits no distension and no mass. There is no tenderness. There is no rebound and no guarding. No hernia.   Musculoskeletal: He exhibits no edema or tenderness.   No cogwheel rigidity, 5 out of 5 strength in upper and lower extremities.  Patient has unsteady gait.   Lymphadenopathy:     He has no cervical adenopathy.   Neurological: He is alert and oriented to person, place, and time. He displays normal reflexes. No cranial nerve deficit. He exhibits normal muscle tone.   Romberg test is negative   Skin: Skin is warm and dry. No rash noted. He is not diaphoretic. No erythema.   Psychiatric: He has a normal mood and affect. His behavior is normal.             Problems Addressed this Visit        Cardiovascular and Mediastinum    Angina pectoris (CMS/HCC)     Coronary artery disease is unchanged.  Continue current treatment regimen.  Cardiac status will be reassessed in 3 months.  Patient is asymptomatic.  He has not had any episodes of chest pain  recently.  He has nitro as needed for chest pain.  Patient will call clinic or go to the ER if chest pain occurs         Relevant Medications    carvedilol (COREG) 6.25 MG tablet    nitroglycerin (NITROSTAT) 0.4 MG SL tablet    Benign essential hypertension - Primary     Hypertension is unchanged.  Continue current treatment regimen.  Blood pressure will be reassessed in 3 months.         Relevant Medications    carvedilol (COREG) 6.25 MG tablet    lisinopril (PRINIVIL,ZESTRIL) 10 MG tablet    Hyperlipidemia     Lipid abnormalities are unchanged.  Pharmacotherapy as ordered.  Lipids will be reassessed in 3 months.         Relevant Medications    simvastatin (ZOCOR) 10 MG tablet       Nervous and Auditory    Lumbar radiculopathy     Continue meloxicam as needed.  Patient is currently asymptomatic and pain-free.         Relevant Medications    meloxicam (MOBIC) 15 MG tablet       Other    Balance problem     Patient was referred to physical therapy to work on strength and balance issues.  He was also given referral to neurology.  He has had extensive work-up in [place] before moving to [place] which did not show definitive reason for balance issues.         Relevant Orders    Ambulatory Referral to Neurology    Ambulatory Referral to Physical Therapy    Memory deficit     Difficult to determine if memory issues are age-related or secondary to Alzheimer's.  Patient does not have physical exam findings consistent with Parkinson's disease.  Patient was given referral to neurology.  We will continue memantine.          Relevant Medications    memantine (NAMENDA) 5 MG tablet    Other Relevant Orders    Ambulatory Referral to Neurology      Other Visit Diagnoses     Major depressive disorder, remission status unspecified, unspecified whether recurrent        Relevant Medications    memantine (NAMENDA) 5 MG tablet    sertraline (ZOLOFT) 100 MG tablet          Return in about 3 months (around 11/27/2019) for Follow-up HTN,  balance issues.    Parts of this office note have been dictated by voice recognition software. Grammatical and/or spelling errors may be present.     Isma May MD  8/27/2019

## 2019-08-27 NOTE — ASSESSMENT & PLAN NOTE
Coronary artery disease is unchanged.  Continue current treatment regimen.  Cardiac status will be reassessed in 3 months.  Patient is asymptomatic.  He has not had any episodes of chest pain recently.  He has nitro as needed for chest pain.  Patient will call clinic or go to the ER if chest pain occurs

## 2019-08-27 NOTE — ASSESSMENT & PLAN NOTE
Patient was referred to physical therapy to work on strength and balance issues.  He was also given referral to neurology.  He has had extensive work-up in [place] before moving to [place] which did not show definitive reason for balance issues.

## 2019-08-27 NOTE — PROGRESS NOTES
Ayan Pichardo is a 87 y.o. male who presents today to establish care.    Chief Complaint   Patient presents with   • Establish Care     wants to set up new pcp        HPI     Review of Systems     PHQ-9 Depression Screening  Little interest or pleasure in doing things? 0   Feeling down, depressed, or hopeless? 0   Trouble falling or staying asleep, or sleeping too much?     Feeling tired or having little energy?     Poor appetite or overeating?     Feeling bad about yourself - or that you are a failure or have let yourself or your family down?     Trouble concentrating on things, such as reading the newspaper or watching television?     Moving or speaking so slowly that other people could have noticed? Or the opposite - being so fidgety or restless that you have been moving around a lot more than usual?     Thoughts that you would be better off dead, or of hurting yourself in some way?     PHQ-9 Total Score 0   If you checked off any problems, how difficult have these problems made it for you to do your work, take care of things at home, or get along with other people?         Past Medical History:   Diagnosis Date   • Atrial fibrillation (CMS/HCC)    • Back pain    • Brain atrophy    • CAD (coronary artery disease)    • Constipation    • Hyperlipidemia    • Hypersomnia    • Hypertension    • Melanoma (CMS/HCC)     SCALP   • Memory deficit    • Neuropathy    • Radiculopathy         Past Surgical History:   Procedure Laterality Date   • AV NODE ABLATION     • PENILE PROSTHESIS IMPLANT     • SCALP/NECK TISSUE EXPANDER INSERTION N/A 9/11/2018    Procedure: Procedure performed:     1) Excision malignant neoplasm of skin of scalp, 6 cm x 5.5 cm, subfascial    2) full-thickness skin graft closure of 6 cm x 5.5 cm    3) left selective neck dissection (level IIA and IIB)    4) right sentinel lymph node biopsy.    5) complex closure skin of right neck, 10 cm ;  Surgeon: Eric Silva MD;  Location: [facility] OR;  Service:  "ENT        Family History   Problem Relation Age of Onset   • Cancer Mother    • Hypertension Mother         Social History     Socioeconomic History   • Marital status:      Spouse name: Not on file   • Number of children: Not on file   • Years of education: Not on file   • Highest education level: Not on file   Tobacco Use   • Smoking status: Never Smoker   • Smokeless tobacco: Never Used   Substance and Sexual Activity   • Alcohol use: No   • Drug use: No   • Sexual activity: Defer        No obstetric history on file. No LMP for male patient.    Current Outpatient Medications on File Prior to Visit   Medication Sig Dispense Refill   • carvedilol (COREG) 6.25 MG tablet Take 6.25 mg by mouth 2 (Two) Times a Day With Meals.     • lisinopril (PRINIVIL,ZESTRIL) 10 MG tablet Take 10 mg by mouth Daily.     • meloxicam (MOBIC) 15 MG tablet Take 15 mg by mouth Daily.     • MEMANTINE HCL PO Take 5 mg by mouth.     • nitroglycerin (NITROSTAT) 0.4 MG SL tablet Place 0.4 mg under the tongue Every 5 (Five) Minutes As Needed.     • sertraline (ZOLOFT) 100 MG tablet Take 100 mg by mouth Daily.     • simvastatin (ZOCOR) 10 MG tablet Take 10 mg by mouth Every Night.     • [DISCONTINUED] aspirin 81 MG EC tablet Take 81 mg by mouth Daily.       No current facility-administered medications on file prior to visit.        No Known Allergies     Visit Vitals  /72   Pulse 80   Temp 97.1 °F (36.2 °C)   Resp 18   Ht 188 cm (74\")   Wt 88.8 kg (195 lb 12.8 oz)   SpO2 98%   BMI 25.14 kg/m²        Physical Exam     Results for orders placed or performed during the hospital encounter of 09/11/18   Tissue Pathology Exam   Result Value Ref Range    Case Report       Surgical Pathology Report                         Case: [id number]                                  Authorizing Provider:  Eric Silva MD        Collected:           09/11/2018 12:39 PM          Ordering Location:     [facility] OR  Received:            " "09/11/2018 02:10 PM          Pathologist:           Jorge L Sanford MD                                                        Specimens:   1) - Neck, left neck sentinel node                                                                  2) - Neck, left neck level 2                                                                        3) - Scalp, Melanoma, stitch at 12                                                                  4) - Neck, level 5 sentinel node, right neck                                                        5) - Neck, additional level 5 , right neck                                                 Final Diagnosis       1.  [place] lymph node, left neck, excision:  One sentinel lymph node, negative for metastatic melanoma (0/1).    2.  Cervical lymph node, level II, excision:  Two lymph nodes, negative for metastatic melanoma (0/2).     3.  Skin, scalp, excision:  Focal melanoma in situ with adjacent scar.  No evidence of residual invasive melanoma.  All margins clear of invasive and in situ melanoma.  Melanoma in situ present 2.25 mm from 6-9-12:00 margin.    4.  Level V sentinel lymph node, right neck, excision:  One sentinel lymph node, negative for metastatic melanoma (0/1).    5.  Additional level V lymph nodes, right neck, excision:  Three lymph nodes, negative for metastatic melanoma (0/3).    AJCC pathologic stage:  pT3a N0    Comment: The above pathologic staging is a combination of the current surgical pathology case as well as the prior outside pathology report.      Gross Description          Specimen #1 is received in a formalin filled container, labeled with the patient's name, date of birth, and \"left neck cervical node\".  The specimen consists of a a fragment of soft tissue measuring 1.4 x 1.4 x 0.8 cm.  The external surface is yellow-pink with cautery artifact.  The cut surface reveals a red to gray lymph node candidate measuring 0.7 cm in greatest dimension.  The surface " "is not homogenous with focal areas of gray-white discoloration.  The specimen is totally submitted in block 1A.       Specimen #2 is received in a formalin filled container, labeled with the patient's name, date of birth, and \"left neck level 2\".  The specimen consists of 2 fragments of yellow-pink soft tissue measuring 4.3 x 2.5 x 0.6 cm.  The fragments are dissected and reveal 2 red-tan lymph node candidates measuring 0.2 cm in greatest dimension and 0.6 cm in greatest dimension.  The larger lymph node candidate is bisected and the smaller lymph node candidate is submitted intact in block 2A.       Specimen #3 is received in a formalin filled container, labeled with the patient's name, date of birth, and \"melanoma scalp\".  The specimen consists of a circular skin excision measuring 4.3 x 4.2 cm and measures 0.5 cm in depth.  The skin surfaces marked by a centrally located area of shallow ulceration versus previous biopsy site measuring 1.4 x 1.1 cm.  The skin surrounding the ulceration is red-blue with ecchymosis extending over an area measuring 1.7 x 1.6 cm.  A pink-gray firm papule is identified in the 3 o'clock aspect, outside of the area of discoloration highlighted by surgeon, measuring 0.6 x 0.5 cm and stenting to within 0.3 cm of the 3 o'clock peripheral margin.  A second tan-pink dense papule is identified in the 6 o'clock aspect, outside of the area of discoloration highlighted by surgeon, measuring 0.8 x 0.5 cm and extending to within less than 0.1 cm of the 6 o'clock margin.  A third pink-gray papule is identified in the 11 to 12 o'clock aspect, outside of the area of discoloration highlighted by surgeon, measuring 0.5 x 0.4 cm and extending to within 0.4 cm of the 11 to 12 o'clock peripheral margin.     A suture is present on one side and is designated 12 o'clock per surgeon note.  The 12-3 to 6 o'clock margin is inked blue, the 6-9 to 12 o'clock margin is inked orange, and the 12 o'clock pole is inked " "green.  The deep margin is covered with a pink-gray, relatively smooth and intact periosteum.  The cut surface of the central lesion shows gray-white, firm uniform tissue extending to within less than 0.1 cm of the deep margin.  Sectioning through the 3 o'clock papule shows pink-gray, slightly thickened tissue measuring 0.2 cm in thickness and 0.2 cm from the deep margin.  Sectioning through the 6 o'clock papule shows pink-gray thickened tissue measuring 0.2 cm in thickness and 0.3 cm from the deep margin.  Sectioning through the 12 o'clock papule shows slightly thickened pink-gray tissue measuring 0.1 cm in depth and 0.2 cm from the deep margin.     Specimen is serially sectioned and totally submitted from 12 o'clock to 6 o'clock in blocks 3A through 3L.       Specimen #4 is received in a formalin filled container, labeled with the patient's name, date of birth, and \"level 5 sentinel node, right neck\".  The specimen consists of a yellow-pink soft tissue fragment measuring 1.4 x 1.2 x 0.4 cm.  The cut surface is marked by a pink-gray lymph node candidate measuring 0.9 cm in greatest dimension.  A faint area of slightly dark tissue is identified and one half measuring 0.2 in greatest dimension.  The specimen is bisected and totally submitted in block 4A.       Specimen #5 is received in a formalin filled container, labeled with the patient's name, date of birth, and \"additional level 5, right neck\".  The specimen consists of 3 yellow-pink soft tissue fragments aggregating to 4.1 x 2.4 x 0.5 cm.  The external surfaces are yellow-pink, soft and lobulated.  Dissection reveals 2 possible lymph nodes-one bisected lymph node candidate and one intact lymph node candidate are submitted in block 5A.  One intact lymph node candidate is submitted in block 5B.         Microscopic Description       1.  Histologic sections show one reactive lymph node with no evidence of metastatic melanoma.  Immunohistochemical stains for mart1 " and sox10 are negative confirming the above diagnosis.  All controls reacted appropriately.  2.  Histologic sections show 2 benign lymph nodes no evidence of metastatic melanoma.  3.  Histologic sections show a skin excision with extensive dermal fibrosis and associated chronic inflammation composed of lymphocytes and plasma cells.  These areas also show superficial surface ulceration.  These findings are consistent with the prior procedure site.  There is no evidence of residual invasive melanoma within the excision.  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  It extends closely but is not present at the 6-9-12:00 margin.  4.  Histologic sections show one reactive lymph node with no evidence of metastatic melanoma. Immunohistochemical stains for mart1 and sox10 are negative confirming the above diagnosis.  All controls reacted appropriately.  5.  Histologic sections show 3 benign lymph nodes with no evidence of metastatic melanoma.          Problems Addressed this Visit     None          No Follow-up on file.    Parts of this office note have been dictated by voice recognition software. Grammatical and/or spelling errors may be present.    Isma May MD   8/27/2019

## 2019-08-27 NOTE — ASSESSMENT & PLAN NOTE
Difficult to determine if memory issues are age-related or secondary to Alzheimer's.  Patient does not have physical exam findings consistent with Parkinson's disease.  Patient was given referral to neurology.  We will continue memantine.

## 2019-09-24 ENCOUNTER — OFFICE VISIT (OUTPATIENT)
Dept: NEUROLOGY | Facility: CLINIC | Age: 84
End: 2019-09-24

## 2019-09-24 ENCOUNTER — APPOINTMENT (OUTPATIENT)
Dept: LAB | Facility: HOSPITAL | Age: 84
End: 2019-09-24

## 2019-09-24 VITALS
DIASTOLIC BLOOD PRESSURE: 67 MMHG | HEIGHT: 74 IN | HEART RATE: 38 BPM | SYSTOLIC BLOOD PRESSURE: 160 MMHG | BODY MASS INDEX: 25.13 KG/M2

## 2019-09-24 DIAGNOSIS — F02.80 LATE ONSET ALZHEIMER'S DISEASE WITHOUT BEHAVIORAL DISTURBANCE (HCC): Primary | ICD-10-CM

## 2019-09-24 DIAGNOSIS — G30.1 LATE ONSET ALZHEIMER'S DISEASE WITHOUT BEHAVIORAL DISTURBANCE (HCC): Primary | ICD-10-CM

## 2019-09-24 LAB — FOLATE SERPL-MCNC: >20 NG/ML (ref 4.78–24.2)

## 2019-09-24 PROCEDURE — 99205 OFFICE O/P NEW HI 60 MIN: CPT | Performed by: PHYSICIAN ASSISTANT

## 2019-09-24 PROCEDURE — 82746 ASSAY OF FOLIC ACID SERUM: CPT | Performed by: PHYSICIAN ASSISTANT

## 2019-09-24 PROCEDURE — 36415 COLL VENOUS BLD VENIPUNCTURE: CPT | Performed by: PHYSICIAN ASSISTANT

## 2019-09-24 RX ORDER — MEMANTINE HYDROCHLORIDE 10 MG/1
10 TABLET ORAL 2 TIMES DAILY
Qty: 60 TABLET | Refills: 11 | Status: SHIPPED | OUTPATIENT
Start: 2019-09-24 | End: 2019-12-05

## 2019-09-24 NOTE — PROGRESS NOTES
"Subjective     Chief Complaint: memory loss      History of Present Illness   Ayan Pichardo is a 87 y.o. male who comes to clinic today for evaluation of memory loss . While he denies any significant issues himself, his family has noted symptoms since at least 2016 marked initially by forgetfulness. This has gradually worsened  over time. Additional symptoms have included impairments in orientation  and executive function. There have been associated  symptoms of depression. His family notes  impairments in ADL's. His family  manages his medications  and finances. He is currently residing at the [facility] with his wife.     He has also noted balance impairment for several years. He has had several falls, though fortunately without significant injury. He is scheduled to start PT in 10/19.     Prior evaluation has included a head CT  which was reportedly remarkable for chronic ischemic and atrophic changes, but was otherwise unremarkable.  Screening blood work (B12, TSH, CBC, CMP) was also unremarkable. He is currently taking Namenda 5mg BID.        I have reviewed and confirmed the past family, social and medical history as accurate on 9/24/19.     Review of Systems   Constitutional: Negative.    HENT: Negative.    Eyes: Negative.    Respiratory: Negative.    Cardiovascular: Negative.    Gastrointestinal: Negative.    Endocrine: Negative.    Genitourinary: Negative.    Musculoskeletal: Negative.    Skin: Negative.    Allergic/Immunologic: Negative.    Neurological:        Memory loss    Hematological: Negative.        Objective     /67   Pulse (!) 38   Ht 188 cm (74.02\")   BMI 25.13 kg/m²  Pulse: 46    General appearance today is normal.   Peripheral pulses were present and symmetric.  The ophthalmoscopic exam today is unremarkable. The discs and posterior elements are unremarkable.      Physical Exam   Psychiatric: His speech is normal.        Neurologic Exam     Mental Status   Oriented to person. "   Oriented to place.   Disoriented to time. Oriented to day and season.   Registration: recalls 3 of 3 objects. Recall of objects at 5 minutes: 0/3 objects. Follows 3 step commands.   Attention: decreased.   Speech: speech is normal   Level of consciousness: alert  Able to name object. Able to read. Able to repeat. Able to write. Normal comprehension.         Results  MMSE=18      Assessment/Plan   Ayan was seen today for memory loss.    Diagnoses and all orders for this visit:    Late onset Alzheimer's disease without behavioral disturbance  -     Folate  -     CT Head Without Contrast    Other orders  -     memantine (NAMENDA) 10 MG tablet; Take 1 tablet by mouth 2 (Two) Times a Day.          Discussion/Summary   Ayan Pichardo comes to clinic today for evaluation of memory loss . His history and examination today, including cognitive bedside testing is concerning for Alzheimer's Disease. It was elected to obtain screening blood work  and a head CT . After discussing potential treatment options, it was elected to increase his memantine to 10mg BID. I also recommended that he no longer drive. He will then follow up in 4-6 months, or sooner if needed.   I spent 60 minutes face to face with the patient and family with 45 minutes spent on discussing diagnosis, prognosis, diagnostic testing, evaluation, current status, driving, treatment options and management as discussed above.       As part of this visit I reviewed prior lab results, reviewed radiology results, reviewed outside records and obtained additional history from the family which is incorporated in the HPI.      Ela Lobo PA-C

## 2019-10-01 DIAGNOSIS — I20.9 ANGINA PECTORIS (HCC): ICD-10-CM

## 2019-10-01 RX ORDER — NITROGLYCERIN 0.4 MG/1
TABLET SUBLINGUAL
Qty: 25 TABLET | Refills: 10 | Status: SHIPPED | OUTPATIENT
Start: 2019-10-01 | End: 2019-12-11 | Stop reason: HOSPADM

## 2019-10-08 ENCOUNTER — TREATMENT (OUTPATIENT)
Dept: PHYSICAL THERAPY | Facility: CLINIC | Age: 84
End: 2019-10-08

## 2019-10-08 DIAGNOSIS — R26.9 GAIT DISTURBANCE: Primary | ICD-10-CM

## 2019-10-08 DIAGNOSIS — R26.89 BALANCE PROBLEM: ICD-10-CM

## 2019-10-08 PROCEDURE — 97161 PT EVAL LOW COMPLEX 20 MIN: CPT | Performed by: PHYSICAL THERAPIST

## 2019-10-08 PROCEDURE — 97110 THERAPEUTIC EXERCISES: CPT | Performed by: PHYSICAL THERAPIST

## 2019-10-08 NOTE — PROGRESS NOTES
Physical Therapy Initial Evaluation and Plan of Care      Patient: Ayan Pichardo   : 1932  Diagnosis/ICD-10 Code:  Gait disturbance [R26.9]  Referring practitioner: No ref. provider found  Date of Initial Visit: 10/8/2019  Today's Date: 10/8/2019  Patient seen for 1 sessions           Subjective Questionnaire: n/a      Subjective Evaluation    History of Present Illness  Mechanism of injury: Pt reports that approx three years ago he began having falls and balance issues.  Pts dtr reports that her father has had increased number of falls this past year.  Pt just moved to [place] from [place] this summer to be closer to family.  He and his wife are living in the [facility].  Pts dtr reports that her father is having an updated CT scan to see if he is consistent with a previous diagnosis of cerabellar ataxia and Alzheimers.  Pt and his wife have access to two meals a day, someone cleans, and they are I with ADLs.  Pt has two dtrs that both live nearby.  Pts dtr reports approx 15 falls in the past year with losing balance.  Ambulates with rollator, owns SC that he uses in the home but mostly grabs for objects and furniture, grab bars in bathroom.        Patient Occupation: retired  as  Quality of life: good    Pain  No pain reported    Social Support  Lives in: community-based residential facility  Lives with: spouse    Hand dominance: right    Treatments  Previous treatment: physical therapy  Patient Goals  Patient goals for therapy: improved balance  Patient goal: not fall; adjust equipment           Objective       Static Posture   General Observations  Shifted right and out toed.     Head  Forward.    Shoulders  Rounded.    Strength/Myotome Testing     Left Hip   Planes of Motion   Flexion: 3+  Extension: 3-  Abduction: 3+  Adduction: 3+    Right Hip   Planes of Motion   Extension: 3-  Abduction: 3+  Adduction: 3+    Left Knee   Flexion: 3+  Extension: 4-    Right  Knee   Flexion: 3+  Extension: 4-    Left Ankle/Foot   Dorsiflexion: 3+  Plantar flexion: 3+    Right Ankle/Foot   Dorsiflexion: 3+  Plantar flexion: 3+    Ambulation     Ambulation: Level Surfaces   Ambulation with assistive device: SBA.  Ambulation without assistive device: contact guard assist    Additional Level Surfaces Ambulation Details  Pt currently owns a rollator that is too short for him.  Pts SC was adjusted for his height today.  Pt pushes his rollator too far in front of him and demonstrates decreased B step length, and overall flexed posture.    Ambulation: Stairs   Ascend stairs: contact guard assist  Pattern: reciprocal  Railings: two rails  Descend stairs: contact guard assist  Pattern: reciprocal  Railings: two rails    Observational Gait   Gait: crouched   Decreased walking speed and stride length.   Base of support: normal    Additional Observational Gait Details  Pt demonstrates shuffling gait with fatigue, trunk and B hip/knee flexion causing pt to be at a greater risk of falls.  Pt has limited insight into his balance and gait impairments.       PT Neuro         Assessment & Plan     Assessment  Impairments: abnormal gait, activity intolerance, impaired balance, impaired physical strength, lacks appropriate home exercise program and safety issue  Assessment details: Pt presents with stable impairments following frequent falls and balance issues.  Pt to benefit from skilled PT services to improve gait, balance, strength, transfers and overall functional mobility.  Pts dtr educated that pt would benefit from a new or adjusted rollator secondary to his current one being too short.    Prognosis: good  Functional Limitations: walking and standing  Goals  Plan Goals: Plan Goals: STG (5 visits)  1. Patient to improve JETER balance score to >/= 36 /56 to decrease client's risk of falls.  2. Patient to perform TUG within 28 sec without LOB for improved functional mobility.  3. Patient to ambulate 10  meters without AD within 20 sec without LOB for improved gait alonzo and functional mobility.  4. Patient to improve FGA score to >/= 14 /30 to decrease client's risk of falls.      LTG (10 visits)  1. Patient to improve JETER balance score to >/= 43 /56 to decrease client's risk of falls.  2. Patient to perform TUG within 26 sec without LOB for improved functional mobility.  3. Patient to ambulate 10 meters without AD within 16 sec without LOB for improved gait alonzo and functional mobility.  4. Patient to improve FGA score to >/= 17/30 to decrease client's risk of falls.  5. Patient to be I with HEP.    Plan  Therapy options: will be seen for skilled physical therapy services  Planned therapy interventions: balance/weight-bearing training, gait training, home exercise program, neuromuscular re-education, strengthening and transfer training  Frequency: 1x week  Duration in visits: 10  Plan details: Pt to continue with PT services to improve gait, balance, strength, transfers and overall functional mobility.          Timed:  Manual Therapy:    0     mins  49111;  Therapeutic Exercise:    10     mins  14359;     Neuromuscular Brennan:    0    mins  67713;    Therapeutic Activity:     0     mins  44961;     Gait Trainin     mins  28570;     Ultrasound:     0     mins  46542;    Electrical Stimulation:    0     mins  95147 ( );    Untimed:  Electrical Stimulation:    0     mins  36152 ( );  Mechanical Traction:    0     mins  94391;     Timed Treatment:   45   mins   Total Treatment:     45   mins    PT SIGNATURE: Althea Elias, PT   DATE TREATMENT INITIATED: 10/8/2019    Initial Certification  Certification Period: 2020  I certify that the therapy services are furnished while this patient is under my care.  The services outlined above are required by this patient, and will be reviewed every 90 days.     PHYSICIAN:       DATE:     Please sign and return via fax to  .. Thank you, [facility].

## 2019-10-09 ENCOUNTER — HOSPITAL ENCOUNTER (OUTPATIENT)
Dept: CT IMAGING | Facility: HOSPITAL | Age: 84
Discharge: HOME | End: 2019-10-09
Admitting: PHYSICIAN ASSISTANT

## 2019-10-09 PROCEDURE — 70450 CT HEAD/BRAIN W/O DYE: CPT

## 2019-10-22 ENCOUNTER — DOCUMENTATION (OUTPATIENT)
Dept: PHYSICAL THERAPY | Facility: CLINIC | Age: 84
End: 2019-10-22

## 2019-10-22 NOTE — PROGRESS NOTES
Discharge Summary  Discharge Summary from Physical Therapy Report      Dates  PT visit: n/a  Number of Visits: eval only     Discharge Status of Patient:  Pt to be seen by home health therapy    Goals: Not Met    Discharge Plan: Patient to return to referring/providing physician    Comments Pts dtr called and stated that her father was doing well and needed therapy at home.    Date of Discharge 10/22/2019        Althea Elias, PT  Physical Therapist

## 2019-10-25 ENCOUNTER — TELEPHONE (OUTPATIENT)
Dept: NEUROLOGY | Facility: CLINIC | Age: 84
End: 2019-10-25

## 2019-10-25 NOTE — TELEPHONE ENCOUNTER
"Called daughter, Maria C Leblanc, to introduce myself and assess social support needs. He and wife live at [facility] in independent apartment, door faces enclosed courtyard which is a good safeguard since he's on first floor. Maria C lives in walking distance and sister in town who is involved. Mr. Pichardo is going to be receiving home health, started with outpatient therapy at [facility], she states PT has not been to helpful in past, but it's also difficult to get him out. He's becoming more withdrawn because, often making \"excuses\" saying he doesn't feel well. This is impacting the wife as she feels she has to stay with him rather than doing normal activities within the community. Family doing a good job encouraging her to maintain normal routines, I gave her info on support group. She is concerned with mother's memory which could impact their level of care needs going forward but will keep in touch regarding their needs. She had no other questions or needed further suggestions at this time, agreed to call me if this changes.   "

## 2019-11-22 ENCOUNTER — TELEPHONE (OUTPATIENT)
Dept: FAMILY MEDICINE CLINIC | Facility: CLINIC | Age: 84
End: 2019-11-22

## 2019-11-22 ENCOUNTER — OFFICE VISIT (OUTPATIENT)
Dept: FAMILY MEDICINE CLINIC | Facility: CLINIC | Age: 84
End: 2019-11-22

## 2019-11-22 VITALS
RESPIRATION RATE: 16 BRPM | WEIGHT: 196.4 LBS | DIASTOLIC BLOOD PRESSURE: 86 MMHG | HEART RATE: 77 BPM | SYSTOLIC BLOOD PRESSURE: 136 MMHG | BODY MASS INDEX: 25.21 KG/M2 | OXYGEN SATURATION: 99 % | HEIGHT: 74 IN | TEMPERATURE: 96.5 F

## 2019-11-22 DIAGNOSIS — I48.0 PAROXYSMAL ATRIAL FIBRILLATION (HCC): ICD-10-CM

## 2019-11-22 DIAGNOSIS — M54.16 LUMBAR RADICULOPATHY: Primary | ICD-10-CM

## 2019-11-22 DIAGNOSIS — I10 BENIGN ESSENTIAL HYPERTENSION: ICD-10-CM

## 2019-11-22 DIAGNOSIS — R53.1 WEAKNESS: ICD-10-CM

## 2019-11-22 DIAGNOSIS — R26.89 BALANCE PROBLEM: ICD-10-CM

## 2019-11-22 PROCEDURE — 99214 OFFICE O/P EST MOD 30 MIN: CPT | Performed by: FAMILY MEDICINE

## 2019-11-22 RX ORDER — CARVEDILOL 3.12 MG/1
3.12 TABLET ORAL 2 TIMES DAILY WITH MEALS
Qty: 180 TABLET | Refills: 1 | Status: SHIPPED | OUTPATIENT
Start: 2019-11-22 | End: 2019-12-05

## 2019-11-22 RX ORDER — CETIRIZINE HYDROCHLORIDE 10 MG/1
10 TABLET ORAL AS NEEDED
COMMUNITY

## 2019-11-22 NOTE — ASSESSMENT & PLAN NOTE
Patient's heart rate is currently rate controlled.  Daughter is concerned with rates dropping down into the 40s and patient being sluggish at times.  Will decrease carvedilol dose by half.

## 2019-11-22 NOTE — TELEPHONE ENCOUNTER
Called pt informed PT order sent in to fax on business card.    ----- Message from Isma May MD sent at 11/22/2019  4:11 PM EST -----  Regarding: RE: fax number   Were they wanting home health or home PT? I sent order for PT with the person who is a business card they brought to the office.   ----- Message -----  From: Maycol Jacome MA  Sent: 11/22/2019   3:50 PM  To: Isma May MD  Subject: FW: fax number                                       ----- Message -----  From: Padmini Damon RegSched Rep  Sent: 11/22/2019   3:24 PM  To: Maycol Grady MA  Subject: fax number                                       Dr. May is suppose to be sending orders for home health they called and wanted to leave a fax number to send them too. The fax number is [phone]

## 2019-11-22 NOTE — ASSESSMENT & PLAN NOTE
Hypertension is unchanged.  Medication changes per orders.  Blood pressure will be reassessed in 3 months.

## 2019-11-22 NOTE — ASSESSMENT & PLAN NOTE
Patient reports weakness, lumbar radiculopathy, balance problems, and gait abnormality that have been present for some time now.  He worked once with PT but it was extremely fatigued afterwards.  He has a physical therapist that works in the living center that he is and who would like referral to.  Referral was placed.

## 2019-11-22 NOTE — PROGRESS NOTES
Ayan Pichardo is a 87 y.o. male who presents today for Follow-up (general check up)      He is here today to be seen for a follow up of his PMH of HTN and late onset alzheimer's. He states that in June, he was started on memantine. He is here with his daughter today, who acts as a reliable historian on his behalf. She states that when he recently moved to [place] from [place] in the summer, he hasd as difficulty time with the medication at the beginning but he seems to be tolerating the medication well now. She cannot notice a significant difference in his cognition, but stats that his symptoms have not worsened. He also reports a seven day history of neck pain  That has associated stiffness and soreness. He denies acute trauma or overuse. He states that he has had this before and it was usually due to awkward sleeping positions. His daughter states that he has difficulty ambulating with his walker and that he seems to favor his shoulders more, which may contribute to fatigue in his neck. His daughter is also concerned over his physical therapy treatment and states that she would like to seek encouragement today for him to consistently go to physical therapy. She states that he has not been motivated to go lately and she fear non compliance will put him at greater risk for falls, which he she reports have happened in the last week. His daughter states that when he fell this last week, he fell on his backside and she denies LOC.  Patient's daughter checks his blood pressure regularly.  He said it does fluctuate significantly but does not have episodes of hypotension.  She is most concerned with his heart rate which has been dropping down into the 40s at times.  He has not been symptomatic during these episodes of bradycardia.  She states that he does seem tired and slow-moving most of the time, but this is not increase with bradycardia.  He denies chest pain, fever, SOB or fatigue.          Review of Systems    Constitutional: Negative for chills, diaphoresis, fatigue, fever, unexpected weight gain and unexpected weight loss.   Respiratory: Negative for apnea, cough, choking, chest tightness and shortness of breath.    Cardiovascular: Negative for chest pain and palpitations.   Gastrointestinal: Negative for abdominal pain, blood in stool, diarrhea, nausea, vomiting, GERD and indigestion.   Genitourinary: Negative for difficulty urinating, dysuria, hematuria and urgency.   Musculoskeletal: Positive for neck stiffness.   Skin: Negative for color change, dry skin, pallor, rash, skin lesions and bruise.   Neurological: Positive for memory problem. Negative for dizziness, tremors, syncope, speech difficulty, numbness, headache and confusion.   Psychiatric/Behavioral: Negative for agitation, dysphoric mood, self-injury, suicidal ideas and negative for hyperactivity. The patient is not nervous/anxious.         The following portions of the patient's history were reviewed and updated as appropriate: allergies, current medications, past family history, past medical history, past social history, past surgical history and problem list.    Current Outpatient Medications on File Prior to Visit   Medication Sig Dispense Refill   • cetirizine (zyrTEC) 10 MG tablet Take 10 mg by mouth Daily.     • lisinopril (PRINIVIL,ZESTRIL) 10 MG tablet Take 1 tablet by mouth Daily. (Patient taking differently: Take 10 mg by mouth 2 (Two) Times a Day.) 90 tablet 3   • meloxicam (MOBIC) 15 MG tablet Take 1 tablet by mouth Daily As Needed for Mild Pain . 30 tablet 1   • memantine (NAMENDA) 10 MG tablet Take 1 tablet by mouth 2 (Two) Times a Day. 60 tablet 11   • nitroglycerin (NITROSTAT) 0.4 MG SL tablet DISSOLVE 1 TABLET UNDER THE TONGUE AS NEEDED FOR CHEST PAIN EVERY 5 MINUTES UP TO 3 TIMES. IF NO RELIEF CALL 911. 25 tablet 10   • sertraline (ZOLOFT) 100 MG tablet Take 1 tablet by mouth Daily. 90 tablet 3   • simvastatin (ZOCOR) 10 MG tablet Take 1  "tablet by mouth Every Night. 90 tablet 3   • [DISCONTINUED] carvedilol (COREG) 6.25 MG tablet Take 1 tablet by mouth 2 (Two) Times a Day With Meals. 90 tablet 3     No current facility-administered medications on file prior to visit.        No Known Allergies     Visit Vitals  /86   Pulse 77   Temp 96.5 °F (35.8 °C)   Resp 16   Ht 188 cm (74\")   Wt 89.1 kg (196 lb 6.4 oz)   SpO2 99%   BMI 25.22 kg/m²        Physical Exam   Constitutional: He is oriented to person, place, and time. He appears well-developed and well-nourished. No distress.   HENT:   Head: Normocephalic and atraumatic.   Right Ear: External ear normal.   Left Ear: External ear normal.   Nose: Nose normal.   Mouth/Throat: Oropharynx is clear and moist. No oropharyngeal exudate.   Eyes: Conjunctivae and EOM are normal. Pupils are equal, round, and reactive to light. Right eye exhibits no discharge. Left eye exhibits no discharge. No scleral icterus.   Neck: Normal range of motion. Neck supple. No tracheal deviation present. No thyromegaly present.   Cardiovascular: Normal rate, regular rhythm, normal heart sounds and intact distal pulses. Exam reveals no gallop and no friction rub.   No murmur heard.  Pulmonary/Chest: Effort normal and breath sounds normal. No respiratory distress. He has no wheezes. He has no rales. He exhibits no tenderness.   Abdominal: Soft. Bowel sounds are normal. He exhibits no distension and no mass. There is no tenderness. There is no rebound and no guarding. No hernia.   Musculoskeletal: Normal range of motion. He exhibits no edema, tenderness or deformity.   Neurological: He is alert and oriented to person, place, and time. Coordination normal.   Skin: Skin is warm and dry. Capillary refill takes less than 2 seconds. No rash noted. He is not diaphoretic. No erythema. No pallor.   Psychiatric: He has a normal mood and affect. His behavior is normal. Judgment and thought content normal.             Problems Addressed this " Visit        Cardiovascular and Mediastinum    Benign essential hypertension     Hypertension is unchanged.  Medication changes per orders.  Blood pressure will be reassessed in 3 months.         Relevant Medications    carvedilol (COREG) 3.125 MG tablet    Paroxysmal atrial fibrillation (CMS/HCC)     Patient's heart rate is currently rate controlled.  Daughter is concerned with rates dropping down into the 40s and patient being sluggish at times.  Will decrease carvedilol dose by half.         Relevant Medications    carvedilol (COREG) 3.125 MG tablet       Nervous and Auditory    Lumbar radiculopathy - Primary    Relevant Orders    Ambulatory Referral to Physical Therapy       Other    Balance problem     Patient reports weakness, lumbar radiculopathy, balance problems, and gait abnormality that have been present for some time now.  He worked once with PT but it was extremely fatigued afterwards.  He has a physical therapist that works in the living center that he is and who would like referral to.  Referral was placed.         Relevant Orders    Ambulatory Referral to Physical Therapy    Weakness    Relevant Orders    Ambulatory Referral to Physical Therapy          Return in about 3 months (around 2/22/2020) for Medicare Wellness.    Parts of this office note have been dictated by voice recognition software. Grammatical and/or spelling errors may be present.    Isma May MD   11/22/2019

## 2019-12-05 ENCOUNTER — APPOINTMENT (OUTPATIENT)
Dept: GENERAL RADIOLOGY | Facility: HOSPITAL | Age: 84
End: 2019-12-05

## 2019-12-05 ENCOUNTER — HOSPITAL ENCOUNTER (INPATIENT)
Facility: HOSPITAL | Age: 84
LOS: 6 days | Discharge: HOME HEALTH | End: 2019-12-11
Attending: EMERGENCY MEDICINE | Admitting: INTERNAL MEDICINE

## 2019-12-05 ENCOUNTER — TELEPHONE (OUTPATIENT)
Dept: FAMILY MEDICINE CLINIC | Facility: CLINIC | Age: 84
End: 2019-12-05

## 2019-12-05 DIAGNOSIS — F02.80 ALZHEIMER'S DEMENTIA WITHOUT BEHAVIORAL DISTURBANCE, UNSPECIFIED TIMING OF DEMENTIA ONSET: ICD-10-CM

## 2019-12-05 DIAGNOSIS — I44.2 COMPLETE HEART BLOCK (HCC): ICD-10-CM

## 2019-12-05 DIAGNOSIS — G30.9 ALZHEIMER'S DEMENTIA WITHOUT BEHAVIORAL DISTURBANCE, UNSPECIFIED TIMING OF DEMENTIA ONSET: ICD-10-CM

## 2019-12-05 DIAGNOSIS — I44.2 COMPLETE AV BLOCK (HCC): ICD-10-CM

## 2019-12-05 DIAGNOSIS — Z95.0 PRESENCE OF PERMANENT CARDIAC PACEMAKER: ICD-10-CM

## 2019-12-05 DIAGNOSIS — Z86.79 HISTORY OF CORONARY ARTERY DISEASE: ICD-10-CM

## 2019-12-05 DIAGNOSIS — R53.1 GENERAL WEAKNESS: ICD-10-CM

## 2019-12-05 DIAGNOSIS — I50.20 SYSTOLIC HEART FAILURE, UNSPECIFIED HF CHRONICITY (HCC): ICD-10-CM

## 2019-12-05 DIAGNOSIS — R07.9 NONSPECIFIC CHEST PAIN: ICD-10-CM

## 2019-12-05 DIAGNOSIS — I44.1 SECOND DEGREE TYPE II ATRIOVENTRICULAR BLOCK: Primary | ICD-10-CM

## 2019-12-05 PROBLEM — N28.9 ACUTE RENAL INSUFFICIENCY: Status: ACTIVE | Noted: 2019-12-05

## 2019-12-05 PROBLEM — D75.89 MACROCYTOSIS WITHOUT ANEMIA: Status: ACTIVE | Noted: 2019-12-05

## 2019-12-05 PROBLEM — R00.1 SYMPTOMATIC BRADYCARDIA: Status: ACTIVE | Noted: 2019-12-05

## 2019-12-05 PROBLEM — M79.605 PAIN IN BOTH LOWER EXTREMITIES: Status: RESOLVED | Noted: 2018-09-12 | Resolved: 2019-12-05

## 2019-12-05 PROBLEM — M79.604 PAIN IN BOTH LOWER EXTREMITIES: Status: RESOLVED | Noted: 2018-09-12 | Resolved: 2019-12-05

## 2019-12-05 PROBLEM — D75.89 MACROCYTOSIS WITHOUT ANEMIA: Status: RESOLVED | Noted: 2019-12-05 | Resolved: 2019-12-05

## 2019-12-05 PROBLEM — I20.9 ANGINA PECTORIS (HCC): Status: RESOLVED | Noted: 2018-09-12 | Resolved: 2019-12-05

## 2019-12-05 LAB
ALBUMIN SERPL-MCNC: 4.1 G/DL (ref 3.5–5.2)
ALBUMIN/GLOB SERPL: 1.4 G/DL
ALP SERPL-CCNC: 89 U/L (ref 39–117)
ALT SERPL W P-5'-P-CCNC: 12 U/L (ref 1–41)
ANION GAP SERPL CALCULATED.3IONS-SCNC: 12 MMOL/L (ref 5–15)
AST SERPL-CCNC: 23 U/L (ref 1–40)
BASOPHILS # BLD AUTO: 0.04 10*3/MM3 (ref 0–0.2)
BASOPHILS NFR BLD AUTO: 0.6 % (ref 0–1.5)
BILIRUB SERPL-MCNC: 0.4 MG/DL (ref 0.2–1.2)
BUN BLD-MCNC: 28 MG/DL (ref 8–23)
BUN/CREAT SERPL: 21.7 (ref 7–25)
CALCIUM SPEC-SCNC: 10 MG/DL (ref 8.6–10.5)
CHLORIDE SERPL-SCNC: 105 MMOL/L (ref 98–107)
CO2 SERPL-SCNC: 24 MMOL/L (ref 22–29)
CREAT BLD-MCNC: 1.29 MG/DL (ref 0.76–1.27)
DEPRECATED RDW RBC AUTO: 45.3 FL (ref 37–54)
EOSINOPHIL # BLD AUTO: 0.19 10*3/MM3 (ref 0–0.4)
EOSINOPHIL NFR BLD AUTO: 2.6 % (ref 0.3–6.2)
ERYTHROCYTE [DISTWIDTH] IN BLOOD BY AUTOMATED COUNT: 12.2 % (ref 12.3–15.4)
GFR SERPL CREATININE-BSD FRML MDRD: 53 ML/MIN/1.73
GLOBULIN UR ELPH-MCNC: 3 GM/DL
GLUCOSE BLD-MCNC: 98 MG/DL (ref 65–99)
HCT VFR BLD AUTO: 44.2 % (ref 37.5–51)
HGB BLD-MCNC: 14.6 G/DL (ref 13–17.7)
HOLD SPECIMEN: NORMAL
HOLD SPECIMEN: NORMAL
IMM GRANULOCYTES # BLD AUTO: 0.03 10*3/MM3 (ref 0–0.05)
IMM GRANULOCYTES NFR BLD AUTO: 0.4 % (ref 0–0.5)
LYMPHOCYTES # BLD AUTO: 2.34 10*3/MM3 (ref 0.7–3.1)
LYMPHOCYTES NFR BLD AUTO: 32.5 % (ref 19.6–45.3)
MAGNESIUM SERPL-MCNC: 2.2 MG/DL (ref 1.6–2.4)
MCH RBC QN AUTO: 33.3 PG (ref 26.6–33)
MCHC RBC AUTO-ENTMCNC: 33 G/DL (ref 31.5–35.7)
MCV RBC AUTO: 100.7 FL (ref 79–97)
MONOCYTES # BLD AUTO: 0.7 10*3/MM3 (ref 0.1–0.9)
MONOCYTES NFR BLD AUTO: 9.7 % (ref 5–12)
NEUTROPHILS # BLD AUTO: 3.89 10*3/MM3 (ref 1.7–7)
NEUTROPHILS NFR BLD AUTO: 54.2 % (ref 42.7–76)
NRBC BLD AUTO-RTO: 0 /100 WBC (ref 0–0.2)
NT-PROBNP SERPL-MCNC: 1568 PG/ML (ref 5–1800)
PLATELET # BLD AUTO: 154 10*3/MM3 (ref 140–450)
PMV BLD AUTO: 9.9 FL (ref 6–12)
POTASSIUM BLD-SCNC: 4.7 MMOL/L (ref 3.5–5.2)
PROT SERPL-MCNC: 7.1 G/DL (ref 6–8.5)
RBC # BLD AUTO: 4.39 10*6/MM3 (ref 4.14–5.8)
SODIUM BLD-SCNC: 141 MMOL/L (ref 136–145)
TROPONIN T SERPL-MCNC: 0.03 NG/ML (ref 0–0.03)
TSH SERPL DL<=0.05 MIU/L-ACNC: 2.03 UIU/ML (ref 0.27–4.2)
WBC NRBC COR # BLD: 7.19 10*3/MM3 (ref 3.4–10.8)
WHOLE BLOOD HOLD SPECIMEN: NORMAL
WHOLE BLOOD HOLD SPECIMEN: NORMAL

## 2019-12-05 PROCEDURE — 25010000002 GLUCAGON (HUMAN RECOMBINANT) 1 MG RECONSTITUTED SOLUTION: Performed by: EMERGENCY MEDICINE

## 2019-12-05 PROCEDURE — 85025 COMPLETE CBC W/AUTO DIFF WBC: CPT | Performed by: EMERGENCY MEDICINE

## 2019-12-05 PROCEDURE — 25010000002 ONDANSETRON PER 1 MG: Performed by: EMERGENCY MEDICINE

## 2019-12-05 PROCEDURE — 83880 ASSAY OF NATRIURETIC PEPTIDE: CPT | Performed by: EMERGENCY MEDICINE

## 2019-12-05 PROCEDURE — 71045 X-RAY EXAM CHEST 1 VIEW: CPT

## 2019-12-05 PROCEDURE — 99285 EMERGENCY DEPT VISIT HI MDM: CPT

## 2019-12-05 PROCEDURE — 25010000002 HEPARIN (PORCINE) PER 1000 UNITS: Performed by: INTERNAL MEDICINE

## 2019-12-05 PROCEDURE — 93005 ELECTROCARDIOGRAM TRACING: CPT | Performed by: INTERNAL MEDICINE

## 2019-12-05 PROCEDURE — 99291 CRITICAL CARE FIRST HOUR: CPT | Performed by: INTERNAL MEDICINE

## 2019-12-05 PROCEDURE — 25010000002 HYDRALAZINE PER 20 MG: Performed by: INTERNAL MEDICINE

## 2019-12-05 PROCEDURE — 84443 ASSAY THYROID STIM HORMONE: CPT | Performed by: EMERGENCY MEDICINE

## 2019-12-05 PROCEDURE — 93005 ELECTROCARDIOGRAM TRACING: CPT | Performed by: EMERGENCY MEDICINE

## 2019-12-05 PROCEDURE — 93010 ELECTROCARDIOGRAM REPORT: CPT | Performed by: INTERNAL MEDICINE

## 2019-12-05 PROCEDURE — 84484 ASSAY OF TROPONIN QUANT: CPT | Performed by: EMERGENCY MEDICINE

## 2019-12-05 PROCEDURE — 83735 ASSAY OF MAGNESIUM: CPT | Performed by: EMERGENCY MEDICINE

## 2019-12-05 PROCEDURE — 80053 COMPREHEN METABOLIC PANEL: CPT | Performed by: EMERGENCY MEDICINE

## 2019-12-05 RX ORDER — ONDANSETRON 2 MG/ML
4 INJECTION INTRAMUSCULAR; INTRAVENOUS ONCE
Status: COMPLETED | OUTPATIENT
Start: 2019-12-05 | End: 2019-12-05

## 2019-12-05 RX ORDER — ROSUVASTATIN CALCIUM 10 MG/1
10 TABLET, COATED ORAL NIGHTLY
Status: DISCONTINUED | OUTPATIENT
Start: 2019-12-05 | End: 2019-12-11 | Stop reason: HOSPADM

## 2019-12-05 RX ORDER — MEMANTINE HYDROCHLORIDE 10 MG/1
10 TABLET ORAL NIGHTLY
Status: DISCONTINUED | OUTPATIENT
Start: 2019-12-05 | End: 2019-12-11 | Stop reason: HOSPADM

## 2019-12-05 RX ORDER — ACETAMINOPHEN 650 MG/1
650 SUPPOSITORY RECTAL EVERY 4 HOURS PRN
Status: DISCONTINUED | OUTPATIENT
Start: 2019-12-05 | End: 2019-12-11 | Stop reason: HOSPADM

## 2019-12-05 RX ORDER — SODIUM CHLORIDE 0.9 % (FLUSH) 0.9 %
10 SYRINGE (ML) INJECTION EVERY 12 HOURS SCHEDULED
Status: DISCONTINUED | OUTPATIENT
Start: 2019-12-05 | End: 2019-12-10

## 2019-12-05 RX ORDER — FAMOTIDINE 20 MG/1
20 TABLET, FILM COATED ORAL
Status: DISCONTINUED | OUTPATIENT
Start: 2019-12-05 | End: 2019-12-11 | Stop reason: HOSPADM

## 2019-12-05 RX ORDER — HYDRALAZINE HYDROCHLORIDE 20 MG/ML
20 INJECTION INTRAMUSCULAR; INTRAVENOUS EVERY 4 HOURS PRN
Status: DISCONTINUED | OUTPATIENT
Start: 2019-12-05 | End: 2019-12-11 | Stop reason: HOSPADM

## 2019-12-05 RX ORDER — CARVEDILOL 6.25 MG/1
6.25 TABLET ORAL 2 TIMES DAILY WITH MEALS
COMMUNITY
End: 2019-12-11 | Stop reason: HOSPADM

## 2019-12-05 RX ORDER — MEMANTINE HYDROCHLORIDE 10 MG/1
10 TABLET ORAL
COMMUNITY
End: 2019-12-11 | Stop reason: HOSPADM

## 2019-12-05 RX ORDER — DOCUSATE SODIUM 100 MG/1
100 CAPSULE, LIQUID FILLED ORAL 2 TIMES DAILY
Status: DISCONTINUED | OUTPATIENT
Start: 2019-12-05 | End: 2019-12-11 | Stop reason: HOSPADM

## 2019-12-05 RX ORDER — ONDANSETRON 2 MG/ML
4 INJECTION INTRAMUSCULAR; INTRAVENOUS EVERY 6 HOURS PRN
Status: DISCONTINUED | OUTPATIENT
Start: 2019-12-05 | End: 2019-12-11 | Stop reason: HOSPADM

## 2019-12-05 RX ORDER — ACETAMINOPHEN 325 MG/1
650 TABLET ORAL EVERY 4 HOURS PRN
Status: DISCONTINUED | OUTPATIENT
Start: 2019-12-05 | End: 2019-12-11 | Stop reason: HOSPADM

## 2019-12-05 RX ORDER — DEXTROSE AND SODIUM CHLORIDE 5; .9 G/100ML; G/100ML
100 INJECTION, SOLUTION INTRAVENOUS CONTINUOUS
Status: DISCONTINUED | OUTPATIENT
Start: 2019-12-05 | End: 2019-12-09

## 2019-12-05 RX ORDER — ONDANSETRON 4 MG/1
4 TABLET, FILM COATED ORAL EVERY 6 HOURS PRN
Status: DISCONTINUED | OUTPATIENT
Start: 2019-12-05 | End: 2019-12-11 | Stop reason: HOSPADM

## 2019-12-05 RX ORDER — WATER 1000 ML/1000ML
INJECTION, SOLUTION INTRAVENOUS
Status: DISPENSED
Start: 2019-12-05 | End: 2019-12-06

## 2019-12-05 RX ORDER — NITROGLYCERIN 0.4 MG/1
0.4 TABLET SUBLINGUAL
Status: DISCONTINUED | OUTPATIENT
Start: 2019-12-05 | End: 2019-12-11 | Stop reason: HOSPADM

## 2019-12-05 RX ORDER — SODIUM CHLORIDE 0.9 % (FLUSH) 0.9 %
10 SYRINGE (ML) INJECTION AS NEEDED
Status: DISCONTINUED | OUTPATIENT
Start: 2019-12-05 | End: 2019-12-06 | Stop reason: SDUPTHER

## 2019-12-05 RX ORDER — BISACODYL 5 MG/1
5 TABLET, DELAYED RELEASE ORAL DAILY PRN
Status: DISCONTINUED | OUTPATIENT
Start: 2019-12-05 | End: 2019-12-11 | Stop reason: HOSPADM

## 2019-12-05 RX ORDER — HEPARIN SODIUM 5000 [USP'U]/ML
5000 INJECTION, SOLUTION INTRAVENOUS; SUBCUTANEOUS EVERY 12 HOURS SCHEDULED
Status: DISCONTINUED | OUTPATIENT
Start: 2019-12-05 | End: 2019-12-06

## 2019-12-05 RX ADMIN — ROSUVASTATIN CALCIUM 10 MG: 10 TABLET, COATED ORAL at 20:50

## 2019-12-05 RX ADMIN — GLUCAGON HYDROCHLORIDE 1 MG: 1 INJECTION, POWDER, FOR SOLUTION INTRAMUSCULAR; INTRAVENOUS; SUBCUTANEOUS at 18:27

## 2019-12-05 RX ADMIN — SODIUM CHLORIDE, PRESERVATIVE FREE 10 ML: 5 INJECTION INTRAVENOUS at 20:56

## 2019-12-05 RX ADMIN — ONDANSETRON 4 MG: 2 INJECTION INTRAMUSCULAR; INTRAVENOUS at 18:27

## 2019-12-05 RX ADMIN — DEXTROSE AND SODIUM CHLORIDE 100 ML/HR: 5; 900 INJECTION, SOLUTION INTRAVENOUS at 19:49

## 2019-12-05 RX ADMIN — FAMOTIDINE 20 MG: 20 TABLET ORAL at 20:50

## 2019-12-05 RX ADMIN — SERTRALINE HYDROCHLORIDE 50 MG: 50 TABLET ORAL at 20:50

## 2019-12-05 RX ADMIN — HEPARIN SODIUM 5000 UNITS: 5000 INJECTION, SOLUTION INTRAVENOUS; SUBCUTANEOUS at 20:50

## 2019-12-05 RX ADMIN — HYDRALAZINE HYDROCHLORIDE 20 MG: 20 INJECTION INTRAMUSCULAR; INTRAVENOUS at 19:49

## 2019-12-05 RX ADMIN — ISOPROTERENOL HYDROCHLORIDE 1 MCG/MIN: 0.2 INJECTION, SOLUTION INTRAMUSCULAR; INTRAVENOUS at 20:01

## 2019-12-05 RX ADMIN — MEMANTINE 10 MG: 10 TABLET ORAL at 20:50

## 2019-12-05 RX ADMIN — ACETAMINOPHEN 650 MG: 325 TABLET ORAL at 20:50

## 2019-12-05 RX ADMIN — DOCUSATE SODIUM 100 MG: 100 CAPSULE, LIQUID FILLED ORAL at 20:50

## 2019-12-05 NOTE — ED NOTES
Pt has ready bed but tt is shift change,pt and family updated      Kayla Hickman, RN  12/05/19 [id number]

## 2019-12-05 NOTE — H&P
ICU ADMISSION NOTE    Chief complaint Bradycardia     Subjective     Ayan Pichardo is a 87 y.o. male that presents to [facility] ED on 12/5 with complaints of slow heart rate associated with worsening generalized weakness over the past week.     He has a history of atrial fibrillation not on anticoagulation, CAD, hyperlipidemia, hypertension, brain atrophy, and Alzheimer's dementia.      Per the daughter, his heart rate has been intermittently low in the 40s over the past week with recent reduction in carvedilol dose from 12.5mg BID to 6.25mg daily as noted in Dr. May's office note from 11/22/19.     On ED arrival EKG shows second degree Mobitz 2 heart block with rates 26-32 and is hypertensive with BP 170s-229/.  Labs mostly unrevealing apart from sCr 1.29. CXR negative for acute cardiopulmonary process, but does recognize some aortic arch prominence with follow-up CT imaging recommended. Troponin negative and thyroid studies WNL.     Dr. Lopez discussed the patient's status with Dr. Montiel who recommends ICU admission. His presentation does not appear to be consistent with beta-blocker overdose, but a dose of Zofran and Glucagon was administered without improvement in rate.     The patient is a poor historian with history of Alzheimer's dementia on memantine with impaired balance and previous frequent falls who recently moved to [place] this summer. He lives at the [facility] with his wife and his daughter is very participative in his care.      ATTENDING ADDITIONAL HPI: 87-year-old gentleman, lifetime non-smoker, moved to [place] 5 months ago to be near family.  He presented with bradycardia with associated dizziness.  He does have a previous history of cardiac catheterization with stent.  He does not recall the year.  He took Plavix for a while but it was stopped.  He currently denies angina.  He does admit to fatigue.  In addition, he has a history of paroxysmal  "atrial fibrillation.  He had 2 episodes requiring AV node ablation.  This was performed in [place] at [place].  He has been working with physical therapy as an outpatient for some balance problems.  They noted his severe low heart rate and notified his family physician.  His Coreg dose has been decreased and he is currently taking 3.125 mg once a day.  In the emergency room his heart rate was 30 and he had Mobitz type II, dropping a beat after a P wave.  His blood pressure has been elevated in the emergency room.  In addition to Coreg he also takes lisinopril.  His serum creatinine was elevated in the emergency room at 1.  2 9 compared to a baseline of 0.9 in June.  He has no history of thyroid disease and his TSH was 2.    Review of Systems  Review of Systems   Constitutional: Positive for fatigue. Negative for fever.   HENT: Negative.    Eyes: Negative for visual disturbance.   Respiratory: Positive for shortness of breath. Negative for apnea and cough.    Cardiovascular: Negative for chest pain, palpitations and leg swelling.        \"Low heart rate\"    Gastrointestinal: Positive for constipation. Negative for abdominal pain.   Endocrine: Negative.    Genitourinary: Positive for frequency. Negative for dysuria.   Musculoskeletal: Positive for arthralgias, back pain and neck pain.   Neurological: Positive for dizziness, weakness and light-headedness.   Psychiatric/Behavioral: Positive for confusion.   All other systems reviewed and are negative.       Home Medications    (Not in a hospital admission)  carvedilol (COREG) 6.25 MG tablet  12/5/2019  --  --     cetirizine (zyrTEC) 10 MG tablet  Past Week  --  --    lisinopril (PRINIVIL,ZESTRIL) 10 MG tablet  12/5/2019 08/27/19  --    Take 1 tablet by mouth Daily.    meloxicam (MOBIC) 15 MG tablet  Past Month  08/27/19  --    Take 1 tablet by mouth Daily As Needed for Mild Pain .    memantine (NAMENDA) 10 MG tablet  12/4/2019  --  --    nitroglycerin (NITROSTAT) " 0.4 MG SL tablet  Unknown  10/01/19  --    DISSOLVE 1 TABLET UNDER THE TONGUE AS NEEDED FOR CHEST PAIN EVERY 5 MINUTES UP TO 3 TIMES. IF NO RELIEF CALL 911.    sertraline (ZOLOFT) 100 MG tablet  12/5/2019 08/27/19  --    Take 1 tablet by mouth Daily.    simvastatin (ZOCOR) 10 MG tablet  12/4/2019 08/27/19  --    Take 1 tablet by mouth Every Night.       History  Past Medical History:   Diagnosis Date   • Abnormal brain CT 6/2/2016    Overview:  Dilated lateral and 3rd ventricle. Possibly could be atrophy but cannot completely exclude hydrocephaly.   • Atrial fibrillation (CMS/HCC)    • Back pain    • Brain atrophy (CMS/HCC)    • CAD (coronary artery disease)    • Constipation    • Hyperlipidemia    • Hypersomnia    • Melanoma (CMS/HCC)     SCALP   • Memory deficit    • Neuropathy    • Radiculopathy      Past Surgical History:   Procedure Laterality Date   • AV NODE ABLATION     • CATARACT EXTRACTION     • COLONOSCOPY      <5years, clear   • CORONARY ANGIOPLASTY WITH STENT PLACEMENT      [place]   • PENILE PROSTHESIS IMPLANT     • SCALP/NECK TISSUE EXPANDER INSERTION N/A 9/11/2018    Procedure: Procedure performed:     1) Excision malignant neoplasm of skin of scalp, 6 cm x 5.5 cm, subfascial    2) full-thickness skin graft closure of 6 cm x 5.5 cm    3) left selective neck dissection (level IIA and IIB)    4) right sentinel lymph node biopsy.    5) complex closure skin of right neck, 10 cm ;  Surgeon: Eric Silva MD;  Location: [facility];  Service: ENT     Family History   Problem Relation Age of Onset   • Cancer Mother    • Hypertension Mother      Social History     Tobacco Use   • Smoking status: Never Smoker   • Smokeless tobacco: Never Used   Substance Use Topics   • Alcohol use: No   • Drug use: No       (Not in a hospital admission)  Allergies:  Patient has no known allergies.  Attending physician personally reviewed the past medical history, social history, family history and performed the review of  "systems    Objective   ATTENDING PE  Vital Signs  Blood pressure (!) 197/116, pulse (!) 33, temperature 97.4 °F (36.3 °C), temperature source Oral, resp. rate 16, height 185.4 cm (73\"), weight 88.5 kg (195 lb), SpO2 95 %.    Physical Exam:  General Appearance:   Well-developed older gentleman in no distress   Head:   Normocephalic, atraumatic.  Crown of his head with a large surgical scar   Eyes:          Conjunctiva pink.  Pupils equal and reactive to light   Ears:     Throat:  Oral mucosa moist   Neck:  Trachea midline, no palpable thyroid   Back:      Lungs:    Symmetric chest expansion.  Breath sounds bilateral, equal, clear    Heart:   Slow rate, regular, S1, S2 auscultated   Abdomen:    Flat, bowel sounds present, soft, nontender   Rectal:     Deferred   Extremities:    No pitting edema or cyanosis, healing abrasion right lower extremity approximately 4cm  above the ankle   Pulses:      Skin:  Warm and dry   Lymph nodes:  No cervical adenopathy   Neurologic:  Alert and cooperative.   symmetric       Results Review:   Lab Results (last 24 hours)     Procedure Component Value Units Date/Time    [place] Draw [[id number]] Collected:  12/05/19 1604    Specimen:  Blood Updated:  12/05/19 1715    Narrative:       The following orders were created for panel order [place] Draw.  Procedure                               Abnormality         Status                     ---------                               -----------         ------                     Light Blue Top[[id number]]                                   Final result               Green Top (Gel)[[id number]]                                  Final result               Lavender Top[[id number]]                                     Final result               Gold Top - SST[[id number]]                                   Final result               Green Top (No Gel)[[id number]]                                                            Please view results for these tests " on the individual orders.    Light Blue Top [[id number]] Collected:  12/05/19 1604    Specimen:  Blood Updated:  12/05/19 1715     Extra Tube hold for add-on     Comment: Auto resulted       Green Top (Gel) [[id number]] Collected:  12/05/19 1604    Specimen:  Blood Updated:  12/05/19 1715     Extra Tube Hold for add-ons.     Comment: Auto resulted.       Lavender Top [[id number]] Collected:  12/05/19 1604    Specimen:  Blood Updated:  12/05/19 1715     Extra Tube hold for add-on     Comment: Auto resulted       Gold Top - SST [[id number]] Collected:  12/05/19 1604    Specimen:  Blood Updated:  12/05/19 1715     Extra Tube Hold for add-ons.     Comment: Auto resulted.       Comprehensive Metabolic Panel [[id number]]  (Abnormal) Collected:  12/05/19 1604    Specimen:  Blood Updated:  12/05/19 1712     Glucose 98 mg/dL      BUN 28 mg/dL      Creatinine 1.29 mg/dL      Sodium 141 mmol/L      Potassium 4.7 mmol/L      Comment: Specimen hemolyzed.  Results may be affected.        Chloride 105 mmol/L      CO2 24.0 mmol/L      Calcium 10.0 mg/dL      Total Protein 7.1 g/dL      Albumin 4.10 g/dL      ALT (SGPT) 12 U/L      Comment: Specimen hemolyzed.  Results may be affected.        AST (SGOT) 23 U/L      Comment: Specimen hemolyzed.  Results may be affected.        Alkaline Phosphatase 89 U/L      Total Bilirubin 0.4 mg/dL      eGFR Non African Amer 53 mL/min/1.73      Globulin 3.0 gm/dL      A/G Ratio 1.4 g/dL      BUN/Creatinine Ratio 21.7     Anion Gap 12.0 mmol/L     Narrative:       GFR Normal >60  Chronic Kidney Disease <60  Kidney Failure <15    Troponin [[id number]]  (Normal) Collected:  12/05/19 1604    Specimen:  Blood Updated:  12/05/19 1710     Troponin T 0.028 ng/mL     Narrative:       Troponin T Reference Range:  <= 0.03 ng/mL-   Negative for AMI  >0.03 ng/mL-     Abnormal for myocardial necrosis.  Clinicians would have to utilize clinical acumen, EKG, Troponin and serial changes to determine if it is an Acute  Myocardial Infarction or myocardial injury due to an underlying chronic condition.     TSH [[id number]]  (Normal) Collected:  12/05/19 1604    Specimen:  Blood Updated:  12/05/19 1710     TSH 2.030 uIU/mL     BNP [[id number]]  (Normal) Collected:  12/05/19 1604    Specimen:  Blood Updated:  12/05/19 1710     proBNP 1,568.0 pg/mL     Narrative:       Among patients with dyspnea, NT-proBNP is highly sensitive for the detection of acute congestive heart failure. In addition NT-proBNP of <300 pg/ml effectively rules out acute congestive heart failure with 99% negative predictive value.    Magnesium [[id number]]  (Normal) Collected:  12/05/19 1604    Specimen:  Blood Updated:  12/05/19 1708     Magnesium 2.2 mg/dL     CBC & Differential [[id number]] Collected:  12/05/19 1604    Specimen:  Blood Updated:  12/05/19 1639    Narrative:       The following orders were created for panel order CBC & Differential.  Procedure                               Abnormality         Status                     ---------                               -----------         ------                     CBC Auto Differential[[id number]]        Abnormal            Final result                 Please view results for these tests on the individual orders.    CBC Auto Differential [[id number]]  (Abnormal) Collected:  12/05/19 1604    Specimen:  Blood Updated:  12/05/19 1639     WBC 7.19 10*3/mm3      RBC 4.39 10*6/mm3      Hemoglobin 14.6 g/dL      Hematocrit 44.2 %      .7 fL      MCH 33.3 pg      MCHC 33.0 g/dL      RDW 12.2 %      RDW-SD 45.3 fl      MPV 9.9 fL      Platelets 154 10*3/mm3      Neutrophil % 54.2 %      Lymphocyte % 32.5 %      Monocyte % 9.7 %      Eosinophil % 2.6 %      Basophil % 0.6 %      Immature Grans % 0.4 %      Neutrophils, Absolute 3.89 10*3/mm3      Lymphocytes, Absolute 2.34 10*3/mm3      Monocytes, Absolute 0.70 10*3/mm3      Eosinophils, Absolute 0.19 10*3/mm3      Basophils, Absolute 0.04 10*3/mm3      Immature  Grans, Absolute 0.03 10*3/mm3      nRBC 0.0 /100 WBC         Imaging Results (Last 24 Hours)     Procedure Component Value Units Date/Time    XR Chest 1 View [[id number]] Collected:  12/05/19 1610     Updated:  12/05/19 1700    Narrative:       EXAMINATION: XR CHEST 1 VW-12/05/2019:      INDICATION: Dysrhythmia, triage protocol.      COMPARISON: NONE.     FINDINGS: AP chest radiograph was performed in two acquisitions to image  the entire chest. Chronic changes in the lungs are identified. Aortic  arch calcifications are noted. There is mild prominence of aortic arch  noted. Likely calcified right mid lung granuloma noted. No evidence of  active airspace disease. The visualized upper abdomen is unrevealing. No  acute osseous abnormality identified.       Impression:       1.  No convincing evidence of active airspace disease in the setting of  chronic lung changes.  2.  There is mild prominence of the aortic arch suggested. Consider  follow-up CT imaging of the chest for further evaluation.     D:  12/05/2019  E:  12/05/2019     This report was finalized on 12/5/2019 4:57 PM by Dr. Abdulkadir Kuo MD.            I personally reviewed his chest x-ray and concur with some calcified granulomatous changes, no acute infiltrate edema or effusions.  Heart is mildly globular.  I also reviewed his lab and x-ray data    PROBLEM LIST  Patient Active Problem List   Diagnosis   • Balance problem   • Memory deficit   • BMI 25.0-25.9,adult   • Nonsmoker   • Recurrent malignant melanoma of skin (CMS/HCC)   • Atrial fibrillation not on anticoagulation    • Benign essential hypertension   • Benign prostatic hyperplasia   • Brain atrophy (CMS/HCC)   • CAD    • Chronic constipation   • Coronary arteriosclerosis in native artery   • Dyspnea   • Encounter for screening colonoscopy   • History of penile implant   • Hyperlipidemia   • Hypoesthesia of skin   • Idiopathic peripheral neuropathy   • Lumbar radiculopathy   • Malaise and  fatigue   • Paroxysmal atrial fibrillation (CMS/HCC)   • Systolic heart failure (CMS/HCC)   • Weakness   • Symptomatic bradycardia   • Mobitz type 2 second degree AV block   • Alzheimer's dementia on memantine    • Acute renal insufficiency       Assessment/Plan   ATTENDING ASSESSMENT and PLAN    #1 profound bradycardia with a heart rate of 30 this is associated with dizziness and fatigue.  However he is actually hypertensive.  This could perhaps be from his Coreg, sick sinus syndrome, right coronary artery lesion.  TSH is low making hypothyroid unlikely.  Dr. Montiel was made aware of him by the emergency room physician.  He did not feel temporary pacemaker was required tonight.  Previous EKG in September revealed sinus rhythm with a rate of 55 and some PACs.  At night he has sinus bradycardia with a new right bundle branch block.  Rhythm strip however clearly shows that he has nonconducted P waves.  Troponin was normal at 0.028.  He did receive glucagon and Zofran in the emergency room without benefit.    #2 acute renal insufficiency serum creatinine is 1.29 compared to 0.9 in June.  He does not take diuretics.  Potassium is normal at 4.7.  Calcium is not elevated.  He may be mildly volume depleted or it could be related to his ACE inhibitor    #3 hypertension currently elevated.  His blood pressure was 136/86 on November 22.  However, Coreg dose was decreased to 1/2 according to the note.    #4 history of coronary artery disease with previous stent done in [facility] [facility].  Details are not available    #5 history of proximal atrial fibrillation undergoing AV node ablation x2 in [facility]    #6 Alzheimer's dementia, late onset currently on  Memantine.  Problem is predominantly short-term memory and balance.    Monitor rhythm in the intensive care unit  Stop ACE inhibitor secondary to renal dysfunction  Isopril of rhythm becomes more difficult or an external pacemaker  Consult EP service  Use Norvasc for  hypertension or Cardene drip  Gentle hydration  Echocardiogram to evaluate wall motion and ejection fraction and to look for valvular disease  Substitute Crestor for Zocor which is not on our formulary  Aspirin  Obviously hold beta-blockers  Subcutaneous heparin  Pepcid    Danielle Martinez, RICH, AGAP-BC, FNP-BC   Pulmonary and Critical Care     I interviewed the patient personally and face-to-face, reviewed his past medical history, performed the above physical examination, dictated the above assessment and plan and personally looked at his chest x-ray, EKGs, laboratory data.  RICH reviewed his history in the emergency room and amended the past medical history, but did not perform any face-to-face assessment.  He has a potentially life-threatening bradycardia arrhythmia.  He is at risk for asystole and needs careful monitoring in the ICU, correction of volume status.    Maribel Meier MD    Time: Critical care 45 min    This note was produced with a voice recognition program and may have uncorrected errors.

## 2019-12-05 NOTE — TELEPHONE ENCOUNTER
James with PT called stating patients bp was running 150-160/80 and a pulse of 35 spoke with Lalo, called his daughter and advised to report to ER. Patients daughter voiced understanding.

## 2019-12-05 NOTE — ED PROVIDER NOTES
Subjective   Ayan Pichardo is an 87 y.o. male who presents to the ED with c/o slow heart rate. The patient has had generalized weakness ongoing for the past month that has somewhat worsened over the past week. He notes that his heart hate has been as low as the 40's. Over the last week the patient's Carvedilol dosage has gone from 12.5 twice daily to 6.25 mg twice daily and it is now currently at 6.25 mg of Carvedilol daily in the morning. He has continued to take this Carvedilol dosage and his Lisinopril, as well as restarting his Aspirin dosage last night. The patient complains of generalized weakness although he is able to still carry out his daily activities but denies chest pain, shortness of breath, unexpected weight gain, and leg swelling. He has a past medical history of A-fib, brain atrophy, CAD, hyperlipidemia, hypertension, melanoma of the scalp, and memory deficit. There are no other acute complaints at this time.    The patient is suffering from chronic memory loss. His daughter reminded him in his ED room that he was complaining of chest pain this morning. Specifics about this reported chest pain are unattainable secondary to his memory or lack thereof.        History provided by:  Patient and relative  Palpitations   Palpitations quality:  Slow  Onset quality:  Sudden  Duration:  1 week  Timing:  Intermittent  Progression:  Unchanged  Chronicity:  New  Relieved by:  None tried  Worsened by:  Nothing  Ineffective treatments:  None tried  Associated symptoms: weakness    Associated symptoms: no chest pain, no chest pressure, no leg pain, no lower extremity edema and no shortness of breath    Risk factors: heart disease and hx of atrial fibrillation    Risk factors: no diabetes mellitus, no hx of DVT, no hx of PE and no stress        Review of Systems   Constitutional: Negative for unexpected weight change.   Respiratory: Negative for shortness of breath.    Cardiovascular: Positive for palpitations.  Negative for chest pain and leg swelling.        Patient has had a bradycardic heart rate.   Neurological: Positive for weakness.   All other systems reviewed and are negative.      Past Medical History:   Diagnosis Date   • Abnormal brain CT 6/2/2016    Overview:  Dilated lateral and 3rd ventricle. Possibly could be atrophy but cannot completely exclude hydrocephaly.   • Atrial fibrillation (CMS/HCC)    • Back pain    • Brain atrophy (CMS/HCC)    • CAD (coronary artery disease)    • Constipation    • Hyperlipidemia    • Hypersomnia    • Melanoma (CMS/HCC)     SCALP   • Memory deficit    • Neuropathy    • Radiculopathy        No Known Allergies    Past Surgical History:   Procedure Laterality Date   • AV NODE ABLATION     • CATARACT EXTRACTION     • COLONOSCOPY      <5years, clear   • CORONARY ANGIOPLASTY WITH STENT PLACEMENT      [place]   • PENILE PROSTHESIS IMPLANT     • SCALP/NECK TISSUE EXPANDER INSERTION N/A 9/11/2018    Procedure: Procedure performed:     1) Excision malignant neoplasm of skin of scalp, 6 cm x 5.5 cm, subfascial    2) full-thickness skin graft closure of 6 cm x 5.5 cm    3) left selective neck dissection (level IIA and IIB)    4) right sentinel lymph node biopsy.    5) complex closure skin of right neck, 10 cm ;  Surgeon: Eric Silva MD;  Location: [facility] OR;  Service: ENT       Family History   Problem Relation Age of Onset   • Cancer Mother    • Hypertension Mother        Social History     Socioeconomic History   • Marital status:      Spouse name: Not on file   • Number of children: Not on file   • Years of education: Not on file   • Highest education level: Not on file   Tobacco Use   • Smoking status: Never Smoker   • Smokeless tobacco: Never Used   Substance and Sexual Activity   • Alcohol use: No   • Drug use: No   • Sexual activity: Defer         Objective   Physical Exam   Constitutional: He is oriented to person, place, and time. He appears well-developed and  well-nourished. No distress.   The patient is in no acute distress but appears weak.   HENT:   Head: Normocephalic and atraumatic.   Mouth/Throat: Mucous membranes are dry (slightly).   Eyes: Conjunctivae are normal. No scleral icterus.   Neck: Normal range of motion. Neck supple.   Cardiovascular: Regular rhythm and normal heart sounds. Bradycardia present.   No murmur heard.  Pulses:       Radial pulses are 2+ on the right side, and 2+ on the left side.   Pulmonary/Chest: Effort normal and breath sounds normal. No respiratory distress.   Abdominal: Soft. There is no tenderness.   Musculoskeletal: Normal range of motion. He exhibits no edema.   No peripheral edema.   Neurological: He is alert and oriented to person, place, and time.   Skin: Skin is warm and dry. There is pallor.   Psychiatric: He has a normal mood and affect. His behavior is normal.   Nursing note and vitals reviewed.      Critical Care  Performed by: Anand Lopez MD  Authorized by: Anand Lopez MD     Critical care provider statement:     Critical care time (minutes):  30    Critical care time was exclusive of:  Separately billable procedures and treating other patients    Critical care was necessary to treat or prevent imminent or life-threatening deterioration of the following conditions:  Cardiac failure    Critical care was time spent personally by me on the following activities:  Discussions with consultants, development of treatment plan with patient or surrogate, examination of patient, review of old charts, ordering and review of laboratory studies, ordering and review of radiographic studies, ordering and performing treatments and interventions, re-evaluation of patient's condition, obtaining history from patient or surrogate, pulse oximetry and evaluation of patient's response to treatment             ED Course  ED Course as of Dec 05 1921   Th Dec 05, 2019   1629 I spoke with Dr. Montiel, on-call cardiology.  Case discussed in  detail.  I reviewed the EKG with him and he agrees that this appears to be a Mobitz 2 heart block.  The patient will require admission.  We are awaiting lab work prior to admission to the hospitalist.  [MS]   9203 Dr. Lopez has paged the hospitalist.  [BS]   1618 I have paged the intensivist for admission.  I have kept a close eye on heart rates which ranged between 26 and 32 with a second-degree type II heart block.  This does not appear to be a beta-blocker overdose however this cannot be completely ruled out.  I have ordered a dose of glucagon with Zofran to see if this improves the heart rate.  He will require close monitoring and admission to the ICU.  [MS]   6685 Dr. Lopez has discussed the case with Dr. Meier, intensivist.  [BS]      ED Course User Index  [BS] Sharan Alvarado  [MS] Anand Lopez MD       Recent Results (from the past 24 hour(s))   Comprehensive Metabolic Panel    Collection Time: 12/05/19  4:04 PM   Result Value Ref Range    Glucose 98 65 - 99 mg/dL    BUN 28 (H) 8 - 23 mg/dL    Creatinine 1.29 (H) 0.76 - 1.27 mg/dL    Sodium 141 136 - 145 mmol/L    Potassium 4.7 3.5 - 5.2 mmol/L    Chloride 105 98 - 107 mmol/L    CO2 24.0 22.0 - 29.0 mmol/L    Calcium 10.0 8.6 - 10.5 mg/dL    Total Protein 7.1 6.0 - 8.5 g/dL    Albumin 4.10 3.50 - 5.20 g/dL    ALT (SGPT) 12 1 - 41 U/L    AST (SGOT) 23 1 - 40 U/L    Alkaline Phosphatase 89 39 - 117 U/L    Total Bilirubin 0.4 0.2 - 1.2 mg/dL    eGFR Non African Amer 53 (L) >60 mL/min/1.73    Globulin 3.0 gm/dL    A/G Ratio 1.4 g/dL    BUN/Creatinine Ratio 21.7 7.0 - 25.0    Anion Gap 12.0 5.0 - 15.0 mmol/L   Magnesium    Collection Time: 12/05/19  4:04 PM   Result Value Ref Range    Magnesium 2.2 1.6 - 2.4 mg/dL   Troponin    Collection Time: 12/05/19  4:04 PM   Result Value Ref Range    Troponin T 0.028 0.000 - 0.030 ng/mL   TSH    Collection Time: 12/05/19  4:04 PM   Result Value Ref Range    TSH 2.030 0.270 - 4.200 uIU/mL   BNP    Collection Time:  12/05/19  4:04 PM   Result Value Ref Range    proBNP 1,568.0 5.0-1,800.0 pg/mL   Light Blue Top    Collection Time: 12/05/19  4:04 PM   Result Value Ref Range    Extra Tube hold for add-on    Green Top (Gel)    Collection Time: 12/05/19  4:04 PM   Result Value Ref Range    Extra Tube Hold for add-ons.    Lavender Top    Collection Time: 12/05/19  4:04 PM   Result Value Ref Range    Extra Tube hold for add-on    Gold Top - SST    Collection Time: 12/05/19  4:04 PM   Result Value Ref Range    Extra Tube Hold for add-ons.    CBC Auto Differential    Collection Time: 12/05/19  4:04 PM   Result Value Ref Range    WBC 7.19 3.40 - 10.80 10*3/mm3    RBC 4.39 4.14 - 5.80 10*6/mm3    Hemoglobin 14.6 13.0 - 17.7 g/dL    Hematocrit 44.2 37.5 - 51.0 %    .7 (H) 79.0 - 97.0 fL    MCH 33.3 (H) 26.6 - 33.0 pg    MCHC 33.0 31.5 - 35.7 g/dL    RDW 12.2 (L) 12.3 - 15.4 %    RDW-SD 45.3 37.0 - 54.0 fl    MPV 9.9 6.0 - 12.0 fL    Platelets 154 140 - 450 10*3/mm3    Neutrophil % 54.2 42.7 - 76.0 %    Lymphocyte % 32.5 19.6 - 45.3 %    Monocyte % 9.7 5.0 - 12.0 %    Eosinophil % 2.6 0.3 - 6.2 %    Basophil % 0.6 0.0 - 1.5 %    Immature Grans % 0.4 0.0 - 0.5 %    Neutrophils, Absolute 3.89 1.70 - 7.00 10*3/mm3    Lymphocytes, Absolute 2.34 0.70 - 3.10 10*3/mm3    Monocytes, Absolute 0.70 0.10 - 0.90 10*3/mm3    Eosinophils, Absolute 0.19 0.00 - 0.40 10*3/mm3    Basophils, Absolute 0.04 0.00 - 0.20 10*3/mm3    Immature Grans, Absolute 0.03 0.00 - 0.05 10*3/mm3    nRBC 0.0 0.0 - 0.2 /100 WBC     Note: In addition to lab results from this visit, the labs listed above may include labs taken at another facility or during a different encounter within the last 24 hours. Please correlate lab times with ED admission and discharge times for further clarification of the services performed during this visit.    XR Chest 1 View   Final Result   1.  No convincing evidence of active airspace disease in the setting of   chronic lung changes.   2.   There is mild prominence of the aortic arch suggested. Consider   follow-up CT imaging of the chest for further evaluation.       D:  12/05/2019   E:  12/05/2019       This report was finalized on 12/5/2019 4:57 PM by Dr. Abdulkadir Kuo MD.            Vitals:    12/05/19 1815 12/05/19 1830 12/05/19 1845 12/05/19 1900   BP: (!) 200/111 (!) 183/134 (!) 197/116 171/68   BP Location:       Patient Position:       Pulse: (!) 28 (!) 29 (!) 33 (!) 29   Resp:       Temp:       TempSrc:       SpO2: 99% 99% 95% 96%   Weight:       Height:         Medications   sodium chloride 0.9 % flush 10 mL (not administered)   sterile water (preservative free) injection  - ADS Override Pull (not administered)   sodium chloride 0.9 % flush 10 mL (not administered)   sodium chloride 0.9 % flush 10 mL (not administered)   heparin (porcine) 5000 UNIT/ML injection 5,000 Units (not administered)   dextrose 5 % and sodium chloride 0.9 % infusion (not administered)   acetaminophen (TYLENOL) tablet 650 mg (not administered)     Or   acetaminophen (TYLENOL) suppository 650 mg (not administered)   hydrALAZINE (APRESOLINE) injection 20 mg (not administered)   docusate sodium (COLACE) capsule 100 mg (not administered)   bisacodyl (DULCOLAX) EC tablet 5 mg (not administered)   ondansetron (ZOFRAN) tablet 4 mg (not administered)     Or   ondansetron (ZOFRAN) injection 4 mg (not administered)   famotidine (PEPCID) tablet 20 mg (not administered)   rosuvastatin (CRESTOR) tablet 10 mg (not administered)   sertraline (ZOLOFT) tablet 50 mg (not administered)   glucagon (human recombinant) (GLUCAGEN DIAGNOSTIC) injection 1 mg (1 mg Intravenous Given 12/5/19 1827)   ondansetron (ZOFRAN) injection 4 mg (4 mg Intravenous Given 12/5/19 1827)     ECG/EMG Results (last 24 hours)     Procedure Component Value Units Date/Time    ECG 12 Lead [[id number]] Collected:  12/05/19 1550     Updated:  12/05/19 1656        ECG 12 Lead   Final Result   Test Reason : ppm    Blood Pressure : **/** mmHG   Vent. Rate : 103 BPM     Atrial Rate : 085 BPM      P-R Int : 000 ms          QRS Dur : 148 ms       QT Int : 388 ms       P-R-T Axes : 000 089 267 degrees      QTc Int : 508 ms      Atrial fibrillation with rapid ventricular response      ventricular pacing   Abnormal ECG   When compared with ECG of 05-DEC-2019 20:47,   Atrial fibrillation has replaced Sinus rhythm   Vent. rate has increased BY  42 BPM   Nonspecific intraventricular block has replaced Right bundle branch block   Lateral infarct is now present   Confirmed by JOAQUIN TELLO MD ([id number]) on 12/8/2019 2:55:00 PM      Referred By:             Confirmed By:JOAQUIN TELLO MD      ECG 12 Lead   Final Result   Test Reason : rythym change   Blood Pressure : **/** mmHG   Vent. Rate : 061 BPM     Atrial Rate : 100 BPM      P-R Int : 000 ms          QRS Dur : 150 ms       QT Int : 466 ms       P-R-T Axes : 000 004 026 degrees      QTc Int : 469 ms      Sinus rhythm with 2nd degree AV block (Mobitz I) with occasional premature   ventricular complexes   Right bundle branch block   Abnormal ECG   When compared with ECG of 05-DEC-2019 15:50, (Unconfirmed)   premature ventricular complexes are now present   Sinus rhythm is now with 2nd degree AV block (Mobitz I)   Vent. rate has increased BY  24 BPM   Right bundle branch block has replaced Incomplete right bundle branch    block   Confirmed by ROMINA ARBOLEDA MD ([id number]) on 12/6/2019 5:58:41 PM      Referred By:  STAT           Confirmed By:ROMINA ARBOLEDA MD      ECG 12 Lead   Final Result   Test Reason : Dysrhythmia triage protocol   Blood Pressure : **/** mmHG   Vent. Rate : 037 BPM     Atrial Rate : 037 BPM      P-R Int : 140 ms          QRS Dur : 112 ms       QT Int : 538 ms       P-R-T Axes : 101 000 035 degrees      QTc Int : 422 ms      Second degree, type II heart block   Incomplete right bundle branch block   Abnormal ECG   No previous ECGs available   Confirmed by MIR CANDELARIO MD ([id number])  on 12/6/2019 1:31:46 AM      Referred By:  BERTO           Confirmed By:MIR LOPEZ MD                        [facility]    Final diagnoses:   Second degree type II atrioventricular block   General weakness   Nonspecific chest pain   History of coronary artery disease       Documentation assistance provided by aristides Alvarado.  Information recorded by the scribe was done at my direction and has been verified and validated by me.     Sharan Alvarado  12/05/19 1702       Sharan Alvarado  12/05/19 1801       Sharan Alvarado  12/05/19 9931       Mir Lopez MD  12/08/19 0054

## 2019-12-06 ENCOUNTER — APPOINTMENT (OUTPATIENT)
Dept: CARDIOLOGY | Facility: HOSPITAL | Age: 84
End: 2019-12-06

## 2019-12-06 LAB
ANION GAP SERPL CALCULATED.3IONS-SCNC: 15 MMOL/L (ref 5–15)
BUN BLD-MCNC: 31 MG/DL (ref 8–23)
BUN/CREAT SERPL: 19.7 (ref 7–25)
CA-I SERPL ISE-MCNC: 1.28 MMOL/L (ref 1.12–1.32)
CALCIUM SPEC-SCNC: 9.9 MG/DL (ref 8.6–10.5)
CHLORIDE SERPL-SCNC: 110 MMOL/L (ref 98–107)
CO2 SERPL-SCNC: 20 MMOL/L (ref 22–29)
CREAT BLD-MCNC: 1.57 MG/DL (ref 0.76–1.27)
DEPRECATED RDW RBC AUTO: 48.5 FL (ref 37–54)
ERYTHROCYTE [DISTWIDTH] IN BLOOD BY AUTOMATED COUNT: 12.6 % (ref 12.3–15.4)
GFR SERPL CREATININE-BSD FRML MDRD: 42 ML/MIN/1.73
GLUCOSE BLD-MCNC: 101 MG/DL (ref 65–99)
HCT VFR BLD AUTO: 47.6 % (ref 37.5–51)
HGB BLD-MCNC: 15 G/DL (ref 13–17.7)
INR PPP: 1.13 (ref 0.85–1.16)
MAGNESIUM SERPL-MCNC: 2 MG/DL (ref 1.6–2.4)
MCH RBC QN AUTO: 33 PG (ref 26.6–33)
MCHC RBC AUTO-ENTMCNC: 31.5 G/DL (ref 31.5–35.7)
MCV RBC AUTO: 104.6 FL (ref 79–97)
PHOSPHATE SERPL-MCNC: 2.7 MG/DL (ref 2.5–4.5)
PLATELET # BLD AUTO: 150 10*3/MM3 (ref 140–450)
PMV BLD AUTO: 10 FL (ref 6–12)
POTASSIUM BLD-SCNC: 3.9 MMOL/L (ref 3.5–5.2)
PROTHROMBIN TIME: 14 SECONDS (ref 11.2–14.3)
RBC # BLD AUTO: 4.55 10*6/MM3 (ref 4.14–5.8)
SODIUM BLD-SCNC: 145 MMOL/L (ref 136–145)
TROPONIN T SERPL-MCNC: 0.07 NG/ML (ref 0–0.03)
WBC NRBC COR # BLD: 9.59 10*3/MM3 (ref 3.4–10.8)

## 2019-12-06 PROCEDURE — C1898 LEAD, PMKR, OTHER THAN TRANS: HCPCS | Performed by: INTERNAL MEDICINE

## 2019-12-06 PROCEDURE — 80048 BASIC METABOLIC PNL TOTAL CA: CPT | Performed by: INTERNAL MEDICINE

## 2019-12-06 PROCEDURE — 99233 SBSQ HOSP IP/OBS HIGH 50: CPT | Performed by: INTERNAL MEDICINE

## 2019-12-06 PROCEDURE — 93325 DOPPLER ECHO COLOR FLOW MAPG: CPT

## 2019-12-06 PROCEDURE — 84484 ASSAY OF TROPONIN QUANT: CPT | Performed by: INTERNAL MEDICINE

## 2019-12-06 PROCEDURE — 85610 PROTHROMBIN TIME: CPT | Performed by: INTERNAL MEDICINE

## 2019-12-06 PROCEDURE — 99223 1ST HOSP IP/OBS HIGH 75: CPT | Performed by: NURSE PRACTITIONER

## 2019-12-06 PROCEDURE — 82330 ASSAY OF CALCIUM: CPT | Performed by: INTERNAL MEDICINE

## 2019-12-06 PROCEDURE — 84100 ASSAY OF PHOSPHORUS: CPT | Performed by: INTERNAL MEDICINE

## 2019-12-06 PROCEDURE — 0JH606Z INSERTION OF PACEMAKER, DUAL CHAMBER INTO CHEST SUBCUTANEOUS TISSUE AND FASCIA, OPEN APPROACH: ICD-10-PCS | Performed by: INTERNAL MEDICINE

## 2019-12-06 PROCEDURE — C1785 PMKR, DUAL, RATE-RESP: HCPCS | Performed by: INTERNAL MEDICINE

## 2019-12-06 PROCEDURE — 93308 TTE F-UP OR LMTD: CPT | Performed by: INTERNAL MEDICINE

## 2019-12-06 PROCEDURE — 93325 DOPPLER ECHO COLOR FLOW MAPG: CPT | Performed by: INTERNAL MEDICINE

## 2019-12-06 PROCEDURE — 33208 INSRT HEART PM ATRIAL & VENT: CPT | Performed by: INTERNAL MEDICINE

## 2019-12-06 PROCEDURE — 25010000002 HYDRALAZINE PER 20 MG: Performed by: INTERNAL MEDICINE

## 2019-12-06 PROCEDURE — 85027 COMPLETE CBC AUTOMATED: CPT | Performed by: INTERNAL MEDICINE

## 2019-12-06 PROCEDURE — 25010000002 MIDAZOLAM PER 1 MG: Performed by: INTERNAL MEDICINE

## 2019-12-06 PROCEDURE — 02H63JZ INSERTION OF PACEMAKER LEAD INTO RIGHT ATRIUM, PERCUTANEOUS APPROACH: ICD-10-PCS | Performed by: INTERNAL MEDICINE

## 2019-12-06 PROCEDURE — 25010000002 HALOPERIDOL LACTATE PER 5 MG: Performed by: NURSE PRACTITIONER

## 2019-12-06 PROCEDURE — 25010000003 LIDOCAINE 1 % SOLUTION: Performed by: INTERNAL MEDICINE

## 2019-12-06 PROCEDURE — 93308 TTE F-UP OR LMTD: CPT

## 2019-12-06 PROCEDURE — 25010000002 HEPARIN (PORCINE) PER 1000 UNITS: Performed by: INTERNAL MEDICINE

## 2019-12-06 PROCEDURE — 02HK3JZ INSERTION OF PACEMAKER LEAD INTO RIGHT VENTRICLE, PERCUTANEOUS APPROACH: ICD-10-PCS | Performed by: INTERNAL MEDICINE

## 2019-12-06 PROCEDURE — 99152 MOD SED SAME PHYS/QHP 5/>YRS: CPT | Performed by: INTERNAL MEDICINE

## 2019-12-06 PROCEDURE — 93321 DOPPLER ECHO F-UP/LMTD STD: CPT | Performed by: INTERNAL MEDICINE

## 2019-12-06 PROCEDURE — C1892 INTRO/SHEATH,FIXED,PEEL-AWAY: HCPCS | Performed by: INTERNAL MEDICINE

## 2019-12-06 PROCEDURE — 93321 DOPPLER ECHO F-UP/LMTD STD: CPT

## 2019-12-06 PROCEDURE — 25010000002 SULFUR HEXAFLUORIDE MICROSPH 60.7-25 MG RECONSTITUTED SUSPENSION: Performed by: NURSE PRACTITIONER

## 2019-12-06 PROCEDURE — 25010000003 CEFAZOLIN IN DEXTROSE 2-4 GM/100ML-% SOLUTION: Performed by: NURSE PRACTITIONER

## 2019-12-06 PROCEDURE — 83735 ASSAY OF MAGNESIUM: CPT | Performed by: INTERNAL MEDICINE

## 2019-12-06 PROCEDURE — 99153 MOD SED SAME PHYS/QHP EA: CPT | Performed by: INTERNAL MEDICINE

## 2019-12-06 DEVICE — IMPLANTABLE DEVICE: Type: IMPLANTABLE DEVICE | Status: FUNCTIONAL

## 2019-12-06 DEVICE — PACEMAKER
Type: IMPLANTABLE DEVICE | Status: FUNCTIONAL
Brand: ACCOLADE™ MRI DR

## 2019-12-06 RX ORDER — SODIUM CHLORIDE 0.9 % (FLUSH) 0.9 %
3 SYRINGE (ML) INJECTION EVERY 12 HOURS SCHEDULED
Status: DISCONTINUED | OUTPATIENT
Start: 2019-12-06 | End: 2019-12-10

## 2019-12-06 RX ORDER — SODIUM CHLORIDE 0.9 % (FLUSH) 0.9 %
10 SYRINGE (ML) INJECTION AS NEEDED
Status: DISCONTINUED | OUTPATIENT
Start: 2019-12-06 | End: 2019-12-06 | Stop reason: SDUPTHER

## 2019-12-06 RX ORDER — MIDAZOLAM HYDROCHLORIDE 1 MG/ML
INJECTION INTRAMUSCULAR; INTRAVENOUS AS NEEDED
Status: DISCONTINUED | OUTPATIENT
Start: 2019-12-06 | End: 2019-12-06 | Stop reason: HOSPADM

## 2019-12-06 RX ORDER — ONDANSETRON 2 MG/ML
4 INJECTION INTRAMUSCULAR; INTRAVENOUS EVERY 6 HOURS PRN
Status: DISCONTINUED | OUTPATIENT
Start: 2019-12-06 | End: 2019-12-06 | Stop reason: SDUPTHER

## 2019-12-06 RX ORDER — CETIRIZINE HYDROCHLORIDE 10 MG/1
10 TABLET ORAL DAILY
Status: DISCONTINUED | OUTPATIENT
Start: 2019-12-06 | End: 2019-12-11 | Stop reason: HOSPADM

## 2019-12-06 RX ORDER — BUPIVACAINE HYDROCHLORIDE AND EPINEPHRINE 5; 5 MG/ML; UG/ML
INJECTION, SOLUTION PERINEURAL AS NEEDED
Status: DISCONTINUED | OUTPATIENT
Start: 2019-12-06 | End: 2019-12-06 | Stop reason: HOSPADM

## 2019-12-06 RX ORDER — QUETIAPINE FUMARATE 25 MG/1
25 TABLET, FILM COATED ORAL ONCE
Status: COMPLETED | OUTPATIENT
Start: 2019-12-06 | End: 2019-12-06

## 2019-12-06 RX ORDER — HALOPERIDOL 5 MG/ML
5 INJECTION INTRAMUSCULAR ONCE
Status: COMPLETED | OUTPATIENT
Start: 2019-12-06 | End: 2019-12-06

## 2019-12-06 RX ORDER — SODIUM CHLORIDE 9 MG/ML
1 INJECTION, SOLUTION INTRAVENOUS CONTINUOUS
Status: ACTIVE | OUTPATIENT
Start: 2019-12-06 | End: 2019-12-06

## 2019-12-06 RX ORDER — SODIUM CHLORIDE 0.9 % (FLUSH) 0.9 %
10 SYRINGE (ML) INJECTION AS NEEDED
Status: DISCONTINUED | OUTPATIENT
Start: 2019-12-06 | End: 2019-12-11 | Stop reason: HOSPADM

## 2019-12-06 RX ORDER — LIDOCAINE HYDROCHLORIDE 10 MG/ML
INJECTION, SOLUTION INFILTRATION; PERINEURAL AS NEEDED
Status: DISCONTINUED | OUTPATIENT
Start: 2019-12-06 | End: 2019-12-06 | Stop reason: HOSPADM

## 2019-12-06 RX ORDER — CEFAZOLIN SODIUM 2 G/100ML
2 INJECTION, SOLUTION INTRAVENOUS ONCE
Status: COMPLETED | OUTPATIENT
Start: 2019-12-06 | End: 2019-12-06

## 2019-12-06 RX ADMIN — SERTRALINE HYDROCHLORIDE 50 MG: 50 TABLET ORAL at 21:29

## 2019-12-06 RX ADMIN — HALOPERIDOL LACTATE 5 MG: 5 INJECTION, SOLUTION INTRAMUSCULAR at 20:37

## 2019-12-06 RX ADMIN — SULFUR HEXAFLUORIDE 3 ML: KIT at 16:00

## 2019-12-06 RX ADMIN — MEMANTINE 10 MG: 10 TABLET ORAL at 21:29

## 2019-12-06 RX ADMIN — FAMOTIDINE 20 MG: 20 TABLET ORAL at 10:15

## 2019-12-06 RX ADMIN — HYDRALAZINE HYDROCHLORIDE 20 MG: 20 INJECTION INTRAMUSCULAR; INTRAVENOUS at 22:04

## 2019-12-06 RX ADMIN — ISOPROTERENOL HYDROCHLORIDE 1 MCG/MIN: 0.2 INJECTION, SOLUTION INTRAMUSCULAR; INTRAVENOUS at 13:58

## 2019-12-06 RX ADMIN — CEFAZOLIN SODIUM 2 G: 2 INJECTION, SOLUTION INTRAVENOUS at 18:55

## 2019-12-06 RX ADMIN — HYDRALAZINE HYDROCHLORIDE 20 MG: 20 INJECTION INTRAMUSCULAR; INTRAVENOUS at 06:40

## 2019-12-06 RX ADMIN — ROSUVASTATIN CALCIUM 10 MG: 10 TABLET, COATED ORAL at 21:29

## 2019-12-06 RX ADMIN — HEPARIN SODIUM 5000 UNITS: 5000 INJECTION, SOLUTION INTRAVENOUS; SUBCUTANEOUS at 10:15

## 2019-12-06 RX ADMIN — QUETIAPINE FUMARATE 25 MG: 25 TABLET ORAL at 21:29

## 2019-12-06 NOTE — PAYOR COMM NOTE
"Clarice Suarez (87 y.o. Male)     Date of Birth Social Security Number Address Home Phone MRN    06/05/1932  [address]  [place] [address] [phone] [id number]    [employer] Marital Status          Jain        Admission Date Admission Type Admitting Provider Attending Provider Department, Room/Bed    12/5/19 Emergency Maribel Meier MD Gerhardstein, Donna C, MD [facility] 2B ICU, N231/1    Discharge Date Discharge Disposition Discharge Destination                       Attending Provider:  Maribel Meier MD    Allergies:  No Known Allergies    Isolation:  None   Infection:  None   Code Status:  CPR    Ht:  188 cm (74\")   Wt:  86.6 kg (190 lb 14.7 oz)    Admission Cmt:  None   Principal Problem:  Symptomatic bradycardia [R00.1]                 Active Insurance as of 12/5/2019     Primary Coverage     Payor Plan Insurance Group Employer/Plan Group    ANTHEM MEDICARE REPLACEMENT ANTHEM MEDICARE ADVANTAGE [id number]     Payor Plan Address Payor Plan Phone Number Payor Plan Fax Number Effective Dates    [address] [phone]  1/1/2018 - None Entered    [facility] [id number]       Subscriber Name Subscriber Birth Date Member ID       CLARICE SUAREZ 6/5/1932 [id number]                 Emergency Contacts      (Rel.) Home Phone Work Phone Mobile Phone    Dea Suarez (Spouse) [phone] -- [phone]    Maria C Leblanc (Daughter) [phone] -- [phone]            Insurance Information                ANTHEM MEDICARE REPLACEMENT/ANTHEM MEDICARE ADVANTAGE Phone: [phone]    Subscriber: Clarice Suarez Subscriber#: [id number]    Group#: [id number] Precert#: [id number]             History & Physical      Maribel Meier MD at 12/05/19 1803              ICU ADMISSION NOTE    Chief complaint Bradycardia     Subjective     Clarice Suarez is a 87 y.o. male that presents to [facility] ED on 12/5 with complaints of slow heart " rate associated with worsening generalized weakness over the past week.     He has a history of atrial fibrillation not on anticoagulation, CAD, hyperlipidemia, hypertension, brain atrophy, and Alzheimer's dementia.      Per the daughter, his heart rate has been intermittently low in the 40s over the past week with recent reduction in carvedilol dose from 12.5mg BID to 6.25mg daily as noted in Dr. May's office note from 11/22/19.     On ED arrival EKG shows second degree Mobitz 2 heart block with rates 26-32 and is hypertensive with BP 170s-229/.  Labs mostly unrevealing apart from sCr 1.29. CXR negative for acute cardiopulmonary process, but does recognize some aortic arch prominence with follow-up CT imaging recommended. Troponin negative and thyroid studies WNL.     Dr. Lopez discussed the patient's status with Dr. Montiel who recommends ICU admission. His presentation does not appear to be consistent with beta-blocker overdose, but a dose of Zofran and Glucagon was administered without improvement in rate.     The patient is a poor historian with history of Alzheimer's dementia on memantine with impaired balance and previous frequent falls who recently moved to [place] this summer. He lives at the [facility] with his wife and his daughter is very participative in his care.      ATTENDING ADDITIONAL HPI: 87-year-old gentleman, lifetime non-smoker, moved to [place] 5 months ago to be near family.  He presented with bradycardia with associated dizziness.  He does have a previous history of cardiac catheterization with stent.  He does not recall the year.  He took Plavix for a while but it was stopped.  He currently denies angina.  He does admit to fatigue.  In addition, he has a history of paroxysmal atrial fibrillation.  He had 2 episodes requiring AV node ablation.  This was performed in [place] at [place].  He has been working with physical therapy as an  "outpatient for some balance problems.  They noted his severe low heart rate and notified his family physician.  His Coreg dose has been decreased and he is currently taking 3.125 mg once a day.  In the emergency room his heart rate was 30 and he had Mobitz type II, dropping a beat after a P wave.  His blood pressure has been elevated in the emergency room.  In addition to Coreg he also takes lisinopril.  His serum creatinine was elevated in the emergency room at 1.  2 9 compared to a baseline of 0.9 in June.  He has no history of thyroid disease and his TSH was 2.    Review of Systems  Review of Systems   Constitutional: Positive for fatigue. Negative for fever.   HENT: Negative.    Eyes: Negative for visual disturbance.   Respiratory: Positive for shortness of breath. Negative for apnea and cough.    Cardiovascular: Negative for chest pain, palpitations and leg swelling.        \"Low heart rate\"    Gastrointestinal: Positive for constipation. Negative for abdominal pain.   Endocrine: Negative.    Genitourinary: Positive for frequency. Negative for dysuria.   Musculoskeletal: Positive for arthralgias, back pain and neck pain.   Neurological: Positive for dizziness, weakness and light-headedness.   Psychiatric/Behavioral: Positive for confusion.   All other systems reviewed and are negative.       Home Medications    (Not in a hospital admission)  carvedilol (COREG) 6.25 MG tablet  12/5/2019  --  --     cetirizine (zyrTEC) 10 MG tablet  Past Week  --  --    lisinopril (PRINIVIL,ZESTRIL) 10 MG tablet  12/5/2019 08/27/19  --    Take 1 tablet by mouth Daily.    meloxicam (MOBIC) 15 MG tablet  Past Month  08/27/19  --    Take 1 tablet by mouth Daily As Needed for Mild Pain .    memantine (NAMENDA) 10 MG tablet  12/4/2019  --  --    nitroglycerin (NITROSTAT) 0.4 MG SL tablet  Unknown  10/01/19  --    DISSOLVE 1 TABLET UNDER THE TONGUE AS NEEDED FOR CHEST PAIN EVERY 5 MINUTES UP TO 3 TIMES. IF NO RELIEF CALL 911.    " sertraline (ZOLOFT) 100 MG tablet  12/5/2019 08/27/19  --    Take 1 tablet by mouth Daily.    simvastatin (ZOCOR) 10 MG tablet  12/4/2019 08/27/19  --    Take 1 tablet by mouth Every Night.       History  Past Medical History:   Diagnosis Date   • Abnormal brain CT 6/2/2016    Overview:  Dilated lateral and 3rd ventricle. Possibly could be atrophy but cannot completely exclude hydrocephaly.   • Atrial fibrillation (CMS/HCC)    • Back pain    • Brain atrophy (CMS/HCC)    • CAD (coronary artery disease)    • Constipation    • Hyperlipidemia    • Hypersomnia    • Melanoma (CMS/HCC)     SCALP   • Memory deficit    • Neuropathy    • Radiculopathy      Past Surgical History:   Procedure Laterality Date   • AV NODE ABLATION     • CATARACT EXTRACTION     • COLONOSCOPY      <5years, clear   • CORONARY ANGIOPLASTY WITH STENT PLACEMENT      [place]   • PENILE PROSTHESIS IMPLANT     • SCALP/NECK TISSUE EXPANDER INSERTION N/A 9/11/2018    Procedure: Procedure performed:     1) Excision malignant neoplasm of skin of scalp, 6 cm x 5.5 cm, subfascial    2) full-thickness skin graft closure of 6 cm x 5.5 cm    3) left selective neck dissection (level IIA and IIB)    4) right sentinel lymph node biopsy.    5) complex closure skin of right neck, 10 cm ;  Surgeon: Eric Silva MD;  [facility] OR;  Service: ENT     Family History   Problem Relation Age of Onset   • Cancer Mother    • Hypertension Mother      Social History     Tobacco Use   • Smoking status: Never Smoker   • Smokeless tobacco: Never Used   Substance Use Topics   • Alcohol use: No   • Drug use: No       (Not in a hospital admission)  Allergies:  Patient has no known allergies.  Attending physician personally reviewed the past medical history, social history, family history and performed the review of systems    Objective   ATTENDING PE  Vital Signs  Blood pressure (!) 197/116, pulse (!) 33, temperature 97.4 °F (36.3 °C), temperature source Oral, resp. rate  "16, height 185.4 cm (73\"), weight 88.5 kg (195 lb), SpO2 95 %.    Physical Exam:  General Appearance:   Well-developed older gentleman in no distress   Head:   Normocephalic, atraumatic.  Crown of his head with a large surgical scar   Eyes:          Conjunctiva pink.  Pupils equal and reactive to light   Ears:     Throat:  Oral mucosa moist   Neck:  Trachea midline, no palpable thyroid   Back:      Lungs:    Symmetric chest expansion.  Breath sounds bilateral, equal, clear    Heart:   Slow rate, regular, S1, S2 auscultated   Abdomen:    Flat, bowel sounds present, soft, nontender   Rectal:     Deferred   Extremities:    No pitting edema or cyanosis, healing abrasion right lower extremity approximately 4cm  above the ankle   Pulses:      Skin:  Warm and dry   Lymph nodes:  No cervical adenopathy   Neurologic:  Alert and cooperative.   symmetric       Results Review:   Lab Results (last 24 hours)     Procedure Component Value Units Date/Time    [place] Draw [[id number]] Collected:  12/05/19 1604    Specimen:  Blood Updated:  12/05/19 1715    Narrative:       The following orders were created for panel order [place] Draw.  Procedure                               Abnormality         Status                     ---------                               -----------         ------                     Light Blue Top[[id number]]                                   Final result               Green Top (Gel)[[id number]]                                  Final result               Lavender Top[[id number]]                                     Final result               Gold Top - SST[[id number]]                                   Final result               Green Top (No Gel)[[id number]]                                                            Please view results for these tests on the individual orders.    Light Blue Top [[id number]] Collected:  12/05/19 1604    Specimen:  Blood Updated:  12/05/19 1715     Extra Tube hold for add-on     " Comment: Auto resulted       Green Top (Gel) [[id number]] Collected:  12/05/19 1604    Specimen:  Blood Updated:  12/05/19 1715     Extra Tube Hold for add-ons.     Comment: Auto resulted.       Lavender Top [[id number]] Collected:  12/05/19 1604    Specimen:  Blood Updated:  12/05/19 1715     Extra Tube hold for add-on     Comment: Auto resulted       Gold Top - SST [[id number]] Collected:  12/05/19 1604    Specimen:  Blood Updated:  12/05/19 1715     Extra Tube Hold for add-ons.     Comment: Auto resulted.       Comprehensive Metabolic Panel [[id number]]  (Abnormal) Collected:  12/05/19 1604    Specimen:  Blood Updated:  12/05/19 1712     Glucose 98 mg/dL      BUN 28 mg/dL      Creatinine 1.29 mg/dL      Sodium 141 mmol/L      Potassium 4.7 mmol/L      Comment: Specimen hemolyzed.  Results may be affected.        Chloride 105 mmol/L      CO2 24.0 mmol/L      Calcium 10.0 mg/dL      Total Protein 7.1 g/dL      Albumin 4.10 g/dL      ALT (SGPT) 12 U/L      Comment: Specimen hemolyzed.  Results may be affected.        AST (SGOT) 23 U/L      Comment: Specimen hemolyzed.  Results may be affected.        Alkaline Phosphatase 89 U/L      Total Bilirubin 0.4 mg/dL      eGFR Non African Amer 53 mL/min/1.73      Globulin 3.0 gm/dL      A/G Ratio 1.4 g/dL      BUN/Creatinine Ratio 21.7     Anion Gap 12.0 mmol/L     Narrative:       GFR Normal >60  Chronic Kidney Disease <60  Kidney Failure <15    Troponin [[id number]]  (Normal) Collected:  12/05/19 1604    Specimen:  Blood Updated:  12/05/19 1710     Troponin T 0.028 ng/mL     Narrative:       Troponin T Reference Range:  <= 0.03 ng/mL-   Negative for AMI  >0.03 ng/mL-     Abnormal for myocardial necrosis.  Clinicians would have to utilize clinical acumen, EKG, Troponin and serial changes to determine if it is an Acute Myocardial Infarction or myocardial injury due to an underlying chronic condition.     TSH [[id number]]  (Normal) Collected:  12/05/19 1604    Specimen:  Blood  Updated:  12/05/19 1710     TSH 2.030 uIU/mL     BNP [[id number]]  (Normal) Collected:  12/05/19 1604    Specimen:  Blood Updated:  12/05/19 1710     proBNP 1,568.0 pg/mL     Narrative:       Among patients with dyspnea, NT-proBNP is highly sensitive for the detection of acute congestive heart failure. In addition NT-proBNP of <300 pg/ml effectively rules out acute congestive heart failure with 99% negative predictive value.    Magnesium [[id number]]  (Normal) Collected:  12/05/19 1604    Specimen:  Blood Updated:  12/05/19 1708     Magnesium 2.2 mg/dL     CBC & Differential [[id number]] Collected:  12/05/19 1604    Specimen:  Blood Updated:  12/05/19 1639    Narrative:       The following orders were created for panel order CBC & Differential.  Procedure                               Abnormality         Status                     ---------                               -----------         ------                     CBC Auto Differential[[id number]]        Abnormal            Final result                 Please view results for these tests on the individual orders.    CBC Auto Differential [[id number]]  (Abnormal) Collected:  12/05/19 1604    Specimen:  Blood Updated:  12/05/19 1639     WBC 7.19 10*3/mm3      RBC 4.39 10*6/mm3      Hemoglobin 14.6 g/dL      Hematocrit 44.2 %      .7 fL      MCH 33.3 pg      MCHC 33.0 g/dL      RDW 12.2 %      RDW-SD 45.3 fl      MPV 9.9 fL      Platelets 154 10*3/mm3      Neutrophil % 54.2 %      Lymphocyte % 32.5 %      Monocyte % 9.7 %      Eosinophil % 2.6 %      Basophil % 0.6 %      Immature Grans % 0.4 %      Neutrophils, Absolute 3.89 10*3/mm3      Lymphocytes, Absolute 2.34 10*3/mm3      Monocytes, Absolute 0.70 10*3/mm3      Eosinophils, Absolute 0.19 10*3/mm3      Basophils, Absolute 0.04 10*3/mm3      Immature Grans, Absolute 0.03 10*3/mm3      nRBC 0.0 /100 WBC         Imaging Results (Last 24 Hours)     Procedure Component Value Units Date/Time    XR Chest 1 View  [[id number]] Collected:  12/05/19 1610     Updated:  12/05/19 1700    Narrative:       EXAMINATION: XR CHEST 1 VW-12/05/2019:      INDICATION: Dysrhythmia, triage protocol.      COMPARISON: NONE.     FINDINGS: AP chest radiograph was performed in two acquisitions to image  the entire chest. Chronic changes in the lungs are identified. Aortic  arch calcifications are noted. There is mild prominence of aortic arch  noted. Likely calcified right mid lung granuloma noted. No evidence of  active airspace disease. The visualized upper abdomen is unrevealing. No  acute osseous abnormality identified.       Impression:       1.  No convincing evidence of active airspace disease in the setting of  chronic lung changes.  2.  There is mild prominence of the aortic arch suggested. Consider  follow-up CT imaging of the chest for further evaluation.     D:  12/05/2019  E:  12/05/2019     This report was finalized on 12/5/2019 4:57 PM by Dr. Abdulkadir Kuo MD.            I personally reviewed his chest x-ray and concur with some calcified granulomatous changes, no acute infiltrate edema or effusions.  Heart is mildly globular.  I also reviewed his lab and x-ray data    PROBLEM LIST  Patient Active Problem List   Diagnosis   • Balance problem   • Memory deficit   • BMI 25.0-25.9,adult   • Nonsmoker   • Recurrent malignant melanoma of skin (CMS/HCC)   • Atrial fibrillation not on anticoagulation    • Benign essential hypertension   • Benign prostatic hyperplasia   • Brain atrophy (CMS/HCC)   • CAD    • Chronic constipation   • Coronary arteriosclerosis in native artery   • Dyspnea   • Encounter for screening colonoscopy   • History of penile implant   • Hyperlipidemia   • Hypoesthesia of skin   • Idiopathic peripheral neuropathy   • Lumbar radiculopathy   • Malaise and fatigue   • Paroxysmal atrial fibrillation (CMS/HCC)   • Systolic heart failure (CMS/HCC)   • Weakness   • Symptomatic bradycardia   • Mobitz type 2 second degree  AV block   • Alzheimer's dementia on memantine    • Acute renal insufficiency       Assessment/Plan   ATTENDING ASSESSMENT and PLAN    #1 profound bradycardia with a heart rate of 30 this is associated with dizziness and fatigue.  However he is actually hypertensive.  This could perhaps be from his Coreg, sick sinus syndrome, right coronary artery lesion.  TSH is low making hypothyroid unlikely.  Dr. Montiel was made aware of him by the emergency room physician.  He did not feel temporary pacemaker was required tonight.  Previous EKG in September revealed sinus rhythm with a rate of 55 and some PACs.  At night he has sinus bradycardia with a new right bundle branch block.  Rhythm strip however clearly shows that he has nonconducted P waves.  Troponin was normal at 0.028.  He did receive glucagon and Zofran in the emergency room without benefit.    #2 acute renal insufficiency serum creatinine is 1.29 compared to 0.9 in June.  He does not take diuretics.  Potassium is normal at 4.7.  Calcium is not elevated.  He may be mildly volume depleted or it could be related to his ACE inhibitor    #3 hypertension currently elevated.  His blood pressure was 136/86 on November 22.  However, Coreg dose was decreased to 1/2 according to the note.    #4 history of coronary artery disease with previous stent done in [place].  Details are not available    #5 history of proximal atrial fibrillation undergoing AV node ablation x2 in [place] at [place]    #6 Alzheimer's dementia, late onset currently on  Memantine.  Problem is predominantly short-term memory and balance.    Monitor rhythm in the intensive care unit  Stop ACE inhibitor secondary to renal dysfunction  Isopril of rhythm becomes more difficult or an external pacemaker  Consult EP service  Use Norvasc for hypertension or Cardene drip  Gentle hydration  Echocardiogram to evaluate wall motion and ejection fraction and to look for valvular disease  Substitute Crestor for  "Zocor which is not on our formulary  Aspirin  Obviously hold beta-blockers  Subcutaneous heparin  Pepcid    Danielle Martinez, APRN, AGACNP-BC, FNP-BC   Pulmonary and Critical Care     I interviewed the patient personally and face-to-face, reviewed his past medical history, performed the above physical examination, dictated the above assessment and plan and personally looked at his chest x-ray, EKGs, laboratory data.  APRN reviewed his history in the emergency room and amended the past medical history, but did not perform any face-to-face assessment.  He has a potentially life-threatening bradycardia arrhythmia.  He is at risk for asystole and needs careful monitoring in the ICU, correction of volume status.    Maribel Meier MD    Time: Critical care 45 min    This note was produced with a voice recognition program and may have uncorrected errors.       Electronically signed by Maribel Meier MD at 12/05/19 1915          Emergency Department Notes      Kayla Hickman, RN at 12/05/19 1850        Pt has ready bed but tt is shift change,pt and family updated      Kayla Hickman RN  12/05/19 1851      Electronically signed by Kayla Hickman RN at 12/05/19 1851       Hospital Medications (all)       Dose Frequency Start End    acetaminophen (TYLENOL) suppository 650 mg 650 mg Every 4 Hours PRN 12/5/2019     Sig - Route: Insert 1 suppository into the rectum Every 4 (Four) Hours As Needed for Fever (temperature greater than 101.5 F or headache). - Rectal    Linked Group 1:  \"Or\" Linked Group Details        acetaminophen (TYLENOL) tablet 650 mg 650 mg Every 4 Hours PRN 12/5/2019     Sig - Route: Take 2 tablets by mouth Every 4 (Four) Hours As Needed for Fever (fever greater than 101.5 F or headache). - Oral    Linked Group 1:  \"Or\" Linked Group Details        atropine sulfate injection  - ADS Override Pull   12/5/2019 12/6/2019    Notes to Pharmacy: Created by cabinet override    bisacodyl (DULCOLAX) EC tablet 5 " mg 5 mg Daily PRN 12/5/2019     Sig - Route: Take 1 tablet by mouth Daily As Needed for Constipation. - Oral    dextrose 5 % and sodium chloride 0.9 % infusion 100 mL/hr Continuous 12/5/2019     Sig - Route: Infuse 100 mL/hr into a venous catheter Continuous. - Intravenous    docusate sodium (COLACE) capsule 100 mg 100 mg 2 Times Daily 12/5/2019     Sig - Route: Take 1 capsule by mouth 2 (Two) Times a Day. - Oral    famotidine (PEPCID) tablet 20 mg 20 mg 2 Times Daily Before Meals 12/5/2019     Sig - Route: Take 1 tablet by mouth 2 (Two) Times a Day Before Meals. - Oral    glucagon (human recombinant) (GLUCAGEN DIAGNOSTIC) injection 1 mg 1 mg Once 12/5/2019 12/5/2019    Sig - Route: Infuse 1 mg into a venous catheter 1 (One) Time. - Intravenous    heparin (porcine) 5000 UNIT/ML injection 5,000 Units 5,000 Units Every 12 Hours Scheduled 12/5/2019     Sig - Route: Inject 1 mL under the skin into the appropriate area as directed Every 12 (Twelve) Hours. - Subcutaneous    hydrALAZINE (APRESOLINE) injection 20 mg 20 mg Every 4 Hours PRN 12/5/2019     Sig - Route: Infuse 1 mL into a venous catheter Every 4 (Four) Hours As Needed for High Blood Pressure. - Intravenous    isoproterenol (ISUPREL) 1,000 mcg in sodium chloride 0.9 % 250 mL (4 mcg/mL) infusion 1 mcg/min Continuous 12/5/2019     Sig - Route: Infuse 1 mcg/min into a venous catheter Continuous. - Intravenous    memantine (NAMENDA) tablet 10 mg 10 mg Nightly 12/5/2019     Sig - Route: Take 1 tablet by mouth Every Night. - Oral    nitroglycerin (NITROSTAT) SL tablet 0.4 mg 0.4 mg Every 5 Minutes PRN 12/5/2019     Sig - Route: Place 1 tablet under the tongue Every 5 (Five) Minutes As Needed for Chest Pain. - Sublingual    ondansetron (ZOFRAN) injection 4 mg 4 mg Once 12/5/2019 12/5/2019    Sig - Route: Infuse 2 mL into a venous catheter 1 (One) Time. - Intravenous    ondansetron (ZOFRAN) injection 4 mg 4 mg Every 6 Hours PRN 12/5/2019     Sig - Route: Infuse 2 mL  "into a venous catheter Every 6 (Six) Hours As Needed for Nausea or Vomiting. - Intravenous    Linked Group 2:  \"Or\" Linked Group Details        ondansetron (ZOFRAN) tablet 4 mg 4 mg Every 6 Hours PRN 12/5/2019     Sig - Route: Take 1 tablet by mouth Every 6 (Six) Hours As Needed for Nausea or Vomiting. - Oral    Linked Group 2:  \"Or\" Linked Group Details        rosuvastatin (CRESTOR) tablet 10 mg 10 mg Nightly 12/5/2019     Sig - Route: Take 1 tablet by mouth Every Night. - Oral    sertraline (ZOLOFT) tablet 50 mg 50 mg Nightly 12/5/2019     Sig - Route: Take 1 tablet by mouth Every Night. - Oral    sodium chloride 0.9 % flush 10 mL 10 mL As Needed 12/5/2019     Sig - Route: Infuse 10 mL into a venous catheter As Needed for Line Care. - Intravenous    Cosign for Ordering: Required by Anand Lopez MD    sodium chloride 0.9 % flush 10 mL 10 mL Every 12 Hours Scheduled 12/5/2019     Sig - Route: Infuse 10 mL into a venous catheter Every 12 (Twelve) Hours. - Intravenous    sodium chloride 0.9 % flush 10 mL 10 mL As Needed 12/5/2019     Sig - Route: Infuse 10 mL into a venous catheter As Needed for Line Care. - Intravenous    sterile water (preservative free) injection  - ADS Override Pull   12/5/2019 12/6/2019    Notes to Pharmacy: Created by cabinet override          Orders (last 24 hrs)     Start     Ordered    12/06/19 0600  Basic Metabolic Panel  Morning Draw      12/05/19 1920    12/06/19 0600  Calcium, Ionized  Morning Draw      12/05/19 1920 12/06/19 0600  CBC (No Diff)  Morning Draw      12/05/19 1920    12/06/19 0600  Protime-INR  Morning Draw      12/05/19 1920 12/06/19 0600  Magnesium  Morning Draw      12/05/19 1920    12/06/19 0600  Phosphorus  Morning Draw      12/05/19 1920    12/06/19 0600  Troponin  Morning Draw      12/05/19 1920    12/05/19 2100  sodium chloride 0.9 % flush 10 mL  Every 12 Hours Scheduled      12/05/19 1920 12/05/19 2100  heparin (porcine) 5000 UNIT/ML injection 5,000 " Units  Every 12 Hours Scheduled      12/05/19 1920 12/05/19 2100  docusate sodium (COLACE) capsule 100 mg  2 Times Daily      12/05/19 1920 12/05/19 2100  famotidine (PEPCID) tablet 20 mg  2 Times Daily Before Meals      12/05/19 1920 12/05/19 2100  rosuvastatin (CRESTOR) tablet 10 mg  Nightly      12/05/19 1920 12/05/19 2100  sertraline (ZOLOFT) tablet 50 mg  Nightly      12/05/19 1920 12/05/19 2100  memantine (NAMENDA) tablet 10 mg  Nightly      12/05/19 1921 12/05/19 2000  Vital Signs Every Hour and Per Hospital Policy Based on Patient Condition  Every Hour      12/05/19 1920 12/05/19 2000  Intake and Output  Every Hour      12/05/19 1920 12/05/19 1944  atropine sulfate injection  - ADS Override Pull     Comments:  Created by cabinet override    12/05/19 1944 12/05/19 1931  isoproterenol (ISUPREL) 1,000 mcg in sodium chloride 0.9 % 250 mL (4 mcg/mL) infusion  Continuous      12/05/19 1929 12/05/19 1929  Adult Transthoracic Echo Limited W/ Cont if Necessary Per Protocol  Once      12/05/19 1929 12/05/19 1922  dextrose 5 % and sodium chloride 0.9 % infusion  Continuous      12/05/19 1920 12/05/19 1921  nitroglycerin (NITROSTAT) SL tablet 0.4 mg  Every 5 Minutes PRN      12/05/19 1921 12/05/19 1921  Daily Weights  Daily      12/05/19 1920 12/05/19 1919  ondansetron (ZOFRAN) tablet 4 mg  Every 6 Hours PRN      12/05/19 1920 12/05/19 1919  ondansetron (ZOFRAN) injection 4 mg  Every 6 Hours PRN      12/05/19 1920 12/05/19 1919  bisacodyl (DULCOLAX) EC tablet 5 mg  Daily PRN      12/05/19 1920 12/05/19 1918  hydrALAZINE (APRESOLINE) injection 20 mg  Every 4 Hours PRN      12/05/19 1920 12/05/19 1918  acetaminophen (TYLENOL) tablet 650 mg  Every 4 Hours PRN      12/05/19 1920    12/05/19 1918  acetaminophen (TYLENOL) suppository 650 mg  Every 4 Hours PRN      12/05/19 1920 12/05/19 1918  Inpatient Cardiology Consult  Once     Specialty:  Cardiology  Provider:  (Not  yet assigned)    12/05/19 1920    12/05/19 1917  Code Status and Medical Interventions:  Continuous      12/05/19 1920    12/05/19 1917  Cardiac Monitoring  Continuous      12/05/19 1920 12/05/19 1917  Continuous Pulse Oximetry  Continuous      12/05/19 1920 12/05/19 1917  Strict Bed Rest  Until Discontinued      12/05/19 1920    12/05/19 1917  Use Mobility Guidelines for Advancement of Activity  Continuous      12/05/19 1920    12/05/19 1917  Height & Weight  Once      12/05/19 1920    12/05/19 1917  Insert Peripheral IV  Once      12/05/19 1920 12/05/19 1917  Saline Lock & Maintain IV Access  Continuous      12/05/19 1920 12/05/19 1917  Inpatient Admission  Once      12/05/19 1920 12/05/19 1917  Diet Regular; Cardiac  Diet Effective Now      12/05/19 1920    12/05/19 1916  sodium chloride 0.9 % flush 10 mL  As Needed      12/05/19 1920 12/05/19 1825  sterile water (preservative free) injection  - ADS Override Pull     Comments:  Created by cabinet override    12/05/19 1825    12/05/19 1805  ICU / CCU Bed Request  Once      12/05/19 1804    12/05/19 1802  Critical Care  Once     Comments:  This order was created via procedure documentation    12/05/19 1801    12/05/19 1758  glucagon (human recombinant) (GLUCAGEN DIAGNOSTIC) injection 1 mg  Once      12/05/19 1756    12/05/19 1758  ondansetron (ZOFRAN) injection 4 mg  Once      12/05/19 1756    12/05/19 1542  NPO Diet  Diet Effective Now,   Status:  Canceled      12/05/19 1542    12/05/19 1542  Undress and Gown  Once      12/05/19 1542    12/05/19 1542  Cardiac monitoring  Per Hospital Policy,   Status:  Canceled      12/05/19 1542    12/05/19 1542  Continuous Pulse Oximetry  Continuous,   Status:  Canceled      12/05/19 1542    12/05/19 1542  ECG 12 Lead  Once      12/05/19 1542    12/05/19 1542  XR Chest 1 View  1 Time Imaging      12/05/19 1542    12/05/19 1542  Insert peripheral IV  Once      12/05/19 1542    12/05/19 1542  [place] Draw  Once       12/05/19 1542    12/05/19 1542  CBC & Differential  Once      12/05/19 1542    12/05/19 1542  Comprehensive Metabolic Panel  Once      12/05/19 1542    12/05/19 1542  Magnesium  Once      12/05/19 1542    12/05/19 1542  Troponin  Once      12/05/19 1542    12/05/19 1542  TSH  Once      12/05/19 1542    12/05/19 1542  BNP  STAT      12/05/19 1542    12/05/19 1542  Light Blue Top  PROCEDURE ONCE      12/05/19 1542    12/05/19 1542  Green Top (Gel)  PROCEDURE ONCE      12/05/19 1542    12/05/19 1542  Lavender Top  PROCEDURE ONCE      12/05/19 1542    12/05/19 1542  Gold Top - SST  PROCEDURE ONCE      12/05/19 1542    12/05/19 1542  Green Top (No Gel)  PROCEDURE ONCE      12/05/19 1542    12/05/19 1542  CBC Auto Differential  PROCEDURE ONCE      12/05/19 1542    12/05/19 1541  sodium chloride 0.9 % flush 10 mL  As Needed      12/05/19 1542    Unscheduled  Oxygen Therapy- Nasal Cannula; 2 LPM; Titrate for SPO2: 92%  Continuous PRN      12/05/19 1542    --  carvedilol (COREG) 6.25 MG tablet  2 Times Daily With Meals      12/05/19 1815    --  memantine (NAMENDA) 10 MG tablet  Every Night at Bedtime      12/05/19 1817

## 2019-12-06 NOTE — PROGRESS NOTES
"Clinical Nutrition Note      Patient Name: Ayan Pichardo  MRN: [id number]  Admission date: 12/5/2019      Multidisciplinary Rounds    Additional information obtained during MDR:  RN reports pt w/ baseline confusion r/t Alzheimer's dementia has symptomatic bradycardia and HTN. Awaiting EP cardiology to see pt this am.    Current diet: NPO Diet    Pertinent medical data reviewed  No nutrition risk identified on nursing screen; MST score \"0\"  Menu provided, advised of alternate selections    Intervention:  Plan of care and goals reviewed    Monitor:  RD to follow per protocol      Indiana Ireland MS,RD,LD  12/06/19 11:36 AM  Time: 20 mins       "

## 2019-12-06 NOTE — PROGRESS NOTES
Discharge Planning Assessment  [facility]     Patient Name: Ayan Pichardo  MRN: [id number]  Today's Date: 12/6/2019    Admit Date: 12/5/2019    Discharge Needs Assessment     Row Name 12/06/19 0820       Living Environment    Lives With  spouse    Name(s) of Who Lives With Patient  Dea spouse    Current Living Arrangements  home/apartment/condo    Primary Care Provided by  spouse/significant other    Provides Primary Care For  no one    Family Caregiver if Needed  child(sarah), adult    Family Caregiver Names  Maria C gooden  rn on mother baby    Quality of Family Relationships  helpful;involved;supportive    Able to Return to Prior Arrangements  yes    Living Arrangement Comments  Pt lives with spouse at [facility]. Pt does use RW when he goes outside the house.       Resource/Environmental Concerns    Resource/Environmental Concerns  none       Transition Planning    Patient/Family Anticipates Transition to  home    Patient/Family Anticipated Services at Transition  none    Transportation Anticipated  family or friend will provide       Discharge Needs Assessment    Readmission Within the Last 30 Days  no previous admission in last 30 days    Concerns to be Addressed  no discharge needs identified    Equipment Currently Used at Home  rollator;walker, standard;bath bench    Anticipated Changes Related to Illness  none    Equipment Needed After Discharge  none        Discharge Plan     Row Name 12/06/19 0825       Plan    Plan  IDP    Patient/Family in Agreement with Plan  yes    Plan Comments  Met with pt at bedside, he was confused. Spoke with Maria C gooden at bedside to iniate discharge plan. Her parents live at [facility]. Her dad does ambulate with RW when he goes outside his home. Pt has not had HH. Will await PT eval to determine discharge plan. CM will follow.    Final Discharge Disposition Code  01 - home or self-care        Destination      No service coordination  in this encounter.      Durable Medical Equipment      No service coordination in this encounter.      Dialysis/Infusion      No service coordination in this encounter.      Home Medical Care      No service coordination in this encounter.      Therapy      No service coordination in this encounter.      Community Resources      No service coordination in this encounter.        Expected Discharge Date and Time     Expected Discharge Date Expected Discharge Time    Dec 10, 2019         Demographic Summary     Row Name 12/06/19 0817       General Information    Admission Type  inpatient    Arrived From  emergency department    Referral Source  admission list    Reason for Consult  discharge planning    Preferred Language  English       Contact Information    Permission Granted to Share Info With      Contact Information Comments  Carlyle Coker PCP        Functional Status     Row Name 12/06/19 0818       Functional Status    Usual Activity Tolerance  good       Functional Status, IADL    Medications  assistive person    Meal Preparation  assistive person    Housekeeping  assistive person    Laundry  assistive person    Shopping  assistive person       Employment/    Employment/ Comments  [employer]        Psychosocial    No documentation.       Abuse/Neglect    No documentation.       Legal    No documentation.       Substance Abuse    No documentation.       Patient Forms    No documentation.           Maribel Edwards RN

## 2019-12-06 NOTE — H&P (VIEW-ONLY)
Cardiac Electrophysiology In Patient Consultation        [facility] at [facility]     Consultation      PATIENT NAME:  Ayan Pichardo    :  1932 AGE: 87 y.o.     Date of Admission:  2019  Date of Consultation:  2019    Subjective      REASON FOR CONSULT: Atrioventricular Block    CHIEF COMPLAINT: Weakness, Fatigue, SOA, Dizziness    Problem List:      1. Complete Heart Block  a. 2019 ED presentation with EKG revealing 2:1 complete AV block with heart rate 30 to 40 bpm.  2. Paroxysmal Atrial Fibrillation  a. CHADSVASc = 5 not on anticoagulation  b. Diagnosed / Documented 2008 and started on Amiodarone  c. ECV 2008 ( data deficit )  d. AF ablation 2008 with successful RFA catheter ablation of atrial fibrillation with a circumferential pulmonary vein ablation approach including roof and lateral posterior line  e. Atrial flutter /atrial fibrillation ablation 6/15/2015 with successful pulmonary vein isolation with segment ostial ablation of each vein, and successful ablation of atrial flutter with creation of bidirectional block in the cavotricuspid isthmus.  3. Coronary artery disease  a. Non-ST elevated MI 3/8/2011  b. LHC 3/9/2011 with JESSICA to RCA and LAD.  LVEF 46% /data deficit  4. Chronic Systolic Heart Failure  a. ECHO 2008 LVEF 40% /data deficit  b. ECHO 2013 LVEF 70% /data deficit  c. ECHO 6/15/2015 LVEF 57% /data deficit  5. Essential Hypertension  6. Dyslipidemia  7. Coronary Artery Disease  a. Reported coronary stent approximately 10 years ago at [facility] /data deficit  8. Dementia  9. BPH      HISTORY OF PRESENT ILLNESS:  Mr Pichardo is a 87 year old white male with a past medical history significant for paroxysmal atrial fibrillation, hypertension, dyslipidemia, dementia, and coronary artery disease.  Mr. Pichardo lives at a independent living facility here in [place] and was having physical therapy with a vital sign check  revealing bradycardia 20 to 30 bpm with systolic blood pressure in the 170s. It was recommended that he present to the local emergency room.  Upon presentation he was noted to be in complete heart block 30 to 40 bpm.  He was admitted to the ICU with electrophysiology consultation today per Dr. Granda today.  Upon review of history with patient and family it is reported that he has daily symptoms of increasing weakness, fatigue, SOA, & AGUILERA that are noted to be worse during the morning hours after awakening.  Mr. Pichardo uses a walker for mobility due to gait instability and frequent falls 1–2 times per week with his worst injury occurring approximately 2 weeks ago hitting his head on the kitchen counter.  Patient denies symptoms of palpitations, presyncope, or syncope but the family is unsure if this is why he has frequent falls in light of the patient having dementia and being a poor historian.  The patient does endorse occasional symptoms of chest pain described as a 4/10 in the center of his chest that is transient and relieved with rest.  Patient does report occasionally taking his wife's sublingual nitroglycerin for his symptoms.  He reports taking 2 nitroglycerin 5 days ago with resolution of his symptoms.  Patient admits compliance to his medication therapy with a recent decrease in his Coreg per his PCP for bradycardia which has not helped his slow heart rates.  Patient and family deny any recent signs of infection with fever, sweats, or chills.       PAST MEDICAL HISTORY  Past Medical History:   Diagnosis Date   • Abnormal brain CT 6/2/2016    Overview:  Dilated lateral and 3rd ventricle. Possibly could be atrophy but cannot completely exclude hydrocephaly.   • Atrial fibrillation (CMS/HCC)    • Back pain    • Brain atrophy (CMS/HCC)    • CAD (coronary artery disease)    • Constipation    • Hyperlipidemia    • Hypersomnia    • Melanoma (CMS/HCC)     SCALP   • Memory deficit    • Neuropathy    • Radiculopathy         SURGICAL HISTORY   has a past surgical history that includes Penile prosthesis implant; AV node ablation; Scalp/Neck Tissue Expander Insertion (N/A, 9/11/2018); Cataract extraction; Coronary angioplasty with stent; and Colonoscopy.     SOCIAL HISTORY  Social History     Socioeconomic History   • Marital status:      Spouse name: Not on file   • Number of children: Not on file   • Years of education: Not on file   • Highest education level: Not on file   Tobacco Use   • Smoking status: Never Smoker   • Smokeless tobacco: Never Used   Substance and Sexual Activity   • Alcohol use: No   • Drug use: No   • Sexual activity: Defer       FAMILY HISTORY  family history includes Cancer in his mother; Hypertension in his mother.     MEDICATIONS  Prior to Admission medications    Medication Sig Start Date End Date Taking? Authorizing Provider   carvedilol (COREG) 6.25 MG tablet Take 6.25 mg by mouth 2 (Two) Times a Day With Meals.   Yes Dee Palm MD   cetirizine (zyrTEC) 10 MG tablet Take 10 mg by mouth Daily.   Yes Dee Palm MD   lisinopril (PRINIVIL,ZESTRIL) 10 MG tablet Take 1 tablet by mouth Daily. 8/27/19  Yes Isma May MD   meloxicam (MOBIC) 15 MG tablet Take 1 tablet by mouth Daily As Needed for Mild Pain . 8/27/19  Yes Isma May MD   memantine (NAMENDA) 10 MG tablet Take 10 mg by mouth every night at bedtime.   Yes ProviderDee MD   sertraline (ZOLOFT) 100 MG tablet Take 1 tablet by mouth Daily. 8/27/19  Yes Isma May MD   simvastatin (ZOCOR) 10 MG tablet Take 1 tablet by mouth Every Night. 8/27/19  Yes Isma May MD   nitroglycerin (NITROSTAT) 0.4 MG SL tablet DISSOLVE 1 TABLET UNDER THE TONGUE AS NEEDED FOR CHEST PAIN EVERY 5 MINUTES UP TO 3 TIMES. IF NO RELIEF CALL 911. 10/1/19   Isma May MD       ALLERGIES  No Known Allergies    REVIEW OF SYSTEMS    Constitutional: No fevers or chills, no recent weight gain or weight  "loss, + Weakness, fatigue  Eyes: No visual loss, blurred vision, double vision, yellow sclerae.  ENT: No headaches, hearing loss, vertigo, congestion or sore throat.   Cardiovascular: Per HPI  Respiratory: No cough or wheezing, no sputum production, no hematemesis, + SOA, AGUILERA   Gastrointestinal: No abdominal pain, no nausea, vomiting, constipation, diarrhea, melena.   Genitourinary: No dysuria, hematuria or increased frequency.  Musculoskeletal:  + gait disturbance, weakness   Integumentary: No rashes, urticaria, ulcers or sores.   Neurological: No headache, syncope, paralysis, ataxia, no prior CVA/TIA, + dizziness  Psychiatric: No anxiety, or depression  Endocrine: No diaphoresis, cold or heat intolerance. No polyuria or polydipsia.   Hematologic/Lymphatic: No anemia, abnormal bruising or bleeding. No history of DVT/PE.       Objective     VITAL SIGNS: /84 (BP Location: Left arm, Patient Position: Lying)   Pulse 62   Temp 98.7 °F (37.1 °C) (Oral)   Resp 18   Ht 188 cm (74\")   Wt 86.6 kg (190 lb 14.7 oz)   SpO2 97%   BMI 24.51 kg/m²      ADMIT WEIGHT:  88.5 kg (195 lb)  BMI: Body mass index is 24.51 kg/m².    Admission Weight: 88.5 kg (195 lb)     PHYSICAL EXAM  General appearance: Awake, alert, cooperative, Oriented to person / place  Head: Normocephalic, without obvious abnormality, atraumatic  Eyes: Conjunctivae/corneas clear, EOMs intact  Neck: no adenopathy, no carotid bruit, no JVD and thyroid: not enlarged  Lungs: clear to auscultation bilaterally and no rhonchi or crackles\", ' symmetric  Heart: irregular rate and rhythm, S1, S2 normal, no murmur, click, rub or gallop  Abdomen: Soft, non-tender, bowel sounds normal,  no organomegaly  Extremities: extremities normal, atraumatic, no cyanosis or edema  Skin: Skin color, turgor normal, no rashes or lesions  Neurologic: Grossly normal     CBC:   Results from last 7 days   Lab Units 12/06/19  0548 12/05/19  1604   WBC 10*3/mm3 9.59 7.19   HEMOGLOBIN " g/dL 15.0 14.6   HEMATOCRIT % 47.6 44.2   MCV fL 104.6* 100.7*   PLATELETS 10*3/mm3 150 154         BMP:  Results from last 7 days   Lab Units 12/05/19  1604   POTASSIUM mmol/L 4.7   CHLORIDE mmol/L 105   CO2 mmol/L 24.0   BUN mg/dL 28*   CREATININE mg/dL 1.29*   GLUCOSE mg/dL 98   CALCIUM mg/dL 10.0   MAGNESIUM mg/dL 2.2     PT/INR:   Results from last 7 days   Lab Units 12/06/19  0549   PROTIME Seconds 14.0   INR  1.13     APTT:     MAG:   Results from last 7 days   Lab Units 12/05/19  1604   MAGNESIUM mg/dL 2.2       CURRENT MEDICATIONS:    Current Facility-Administered Medications:   •  acetaminophen (TYLENOL) tablet 650 mg, 650 mg, Oral, Q4H PRN, 650 mg at 12/05/19 2050 **OR** acetaminophen (TYLENOL) suppository 650 mg, 650 mg, Rectal, Q4H PRN, Maribel Meier MD  •  bisacodyl (DULCOLAX) EC tablet 5 mg, 5 mg, Oral, Daily PRN, Maribel Meier MD  •  dextrose 5 % and sodium chloride 0.9 % infusion, 100 mL/hr, Intravenous, Continuous, Maribel Meier MD, Last Rate: 100 mL/hr at 12/05/19 1949, 100 mL/hr at 12/05/19 1949  •  docusate sodium (COLACE) capsule 100 mg, 100 mg, Oral, BID, Mariebl Meier MD, 100 mg at 12/05/19 2050  •  famotidine (PEPCID) tablet 20 mg, 20 mg, Oral, BID AC, Maribel Meier MD, 20 mg at 12/05/19 2050  •  heparin (porcine) 5000 UNIT/ML injection 5,000 Units, 5,000 Units, Subcutaneous, Q12H, Maribel Meier MD, 5,000 Units at 12/05/19 2050  •  hydrALAZINE (APRESOLINE) injection 20 mg, 20 mg, Intravenous, Q4H PRN, Maribel Meier MD, 20 mg at 12/06/19 0640  •  isoproterenol (ISUPREL) 1,000 mcg in sodium chloride 0.9 % 250 mL (4 mcg/mL) infusion, 1 mcg/min, Intravenous, Continuous, Annette Pretty, DNP, APRN, Last Rate: 15 mL/hr at 12/05/19 2001, 1 mcg/min at 12/05/19 2001  •  memantine (NAMENDA) tablet 10 mg, 10 mg, Oral, Nightly, Maribel Meier MD, 10 mg at 12/05/19 2050  •  nitroglycerin (NITROSTAT) SL tablet 0.4 mg, 0.4 mg,  Sublingual, Q5 Min PRN, Maribel Meier MD  •  ondansetron (ZOFRAN) tablet 4 mg, 4 mg, Oral, Q6H PRN **OR** ondansetron (ZOFRAN) injection 4 mg, 4 mg, Intravenous, Q6H PRN, Maribel Meier MD  •  rosuvastatin (CRESTOR) tablet 10 mg, 10 mg, Oral, Nightly, Maribel Meier MD, 10 mg at 19  •  sertraline (ZOLOFT) tablet 50 mg, 50 mg, Oral, Nightly, Maribel Meier MD, 50 mg at 19  •  sodium chloride 0.9 % flush 10 mL, 10 mL, Intravenous, PRN, Anand Lopez MD  •  sodium chloride 0.9 % flush 10 mL, 10 mL, Intravenous, Q12H, Maribel Meier MD, 10 mL at 19  •  sodium chloride 0.9 % flush 10 mL, 10 mL, Intravenous, PRN, Maribel Meier MD  CONTINUOUS INFUSIONS:    dextrose 5 % and sodium chloride 0.9 % 100 mL/hr Last Rate: 100 mL/hr (19)   isoproterenol (ISUPREL) infusion 4 mcg/mL 1 mcg/min Last Rate: 1 mcg/min (19)         [id number] 1550 with CHB 37 bpm, incomplete RBBB  ECHO: Limited ECHO ordered for today / not yet performed       TELEMETRY: Atrial Fibrillation 40-50 bpm    Assessment      1.  Complete heart block  2.  Paroxysmal atrial fibrillation  3.  Essential hypertension  4.  Acute renal insufficiency  5.  Coronary artery disease  6.  Dementia    Plan        Mr. Pichardo is an elderly 87-year-old white male with the above noted past medical history who presents with complete heart block not associated with an identified transient or reversible cause.  He has a concerning history of multiple falls 1-2 times a week.  We will plan to proceed with permanent pacemaker implantation for complete heart block.  All risks, benefits, and alternatives to the procedure were discussed with the patient/family and they are willing to proceed at this time.    Electronically signed by RICH Saleem, 19, 9:31 AM.      This note was completed using a voice transcription system. Every effort was made to ensure  accuracy. However, inadvertent computerized transcription errors may be present.

## 2019-12-06 NOTE — NURSING NOTE
Confirmed with patient's daughter, Maria C, who manages patient's medications last dose of Coreg 6.25 mg.  Last dose was on the EVENING of Wednesday 12/4/19.

## 2019-12-06 NOTE — CONSULTS
Cardiac Electrophysiology In Patient Consultation        [facility] at [facility]     Consultation      PATIENT NAME:  Ayan Pichardo    :  1932 AGE: 87 y.o.     Date of Admission:  2019  Date of Consultation:  2019    Subjective      REASON FOR CONSULT: Atrioventricular Block    CHIEF COMPLAINT: Weakness, Fatigue, SOA, Dizziness    Problem List:      1. Complete Heart Block  a. 2019 ED presentation with EKG revealing 2:1 complete AV block with heart rate 30 to 40 bpm.  2. Paroxysmal Atrial Fibrillation  a. CHADSVASc = 5 not on anticoagulation  b. Diagnosed / Documented 2008 and started on Amiodarone  c. ECV 2008 ( data deficit )  d. AF ablation 2008 with successful RFA catheter ablation of atrial fibrillation with a circumferential pulmonary vein ablation approach including roof and lateral posterior line  e. Atrial flutter /atrial fibrillation ablation 6/15/2015 with successful pulmonary vein isolation with segment ostial ablation of each vein, and successful ablation of atrial flutter with creation of bidirectional block in the cavotricuspid isthmus.  3. Coronary artery disease  a. Non-ST elevated MI 3/8/2011  b. LHC 3/9/2011 with JESSICA to RCA and LAD.  LVEF 46% /data deficit  4. Chronic Systolic Heart Failure  a. ECHO 2008 LVEF 40% /data deficit  b. ECHO 2013 LVEF 70% /data deficit  c. ECHO 6/15/2015 LVEF 57% /data deficit  5. Essential Hypertension  6. Dyslipidemia  7. Coronary Artery Disease  a. Reported coronary stent approximately 10 years ago at [facility] /data deficit  8. Dementia  9. BPH      HISTORY OF PRESENT ILLNESS:  Mr Pichardo is a 87 year old white male with a past medical history significant for paroxysmal atrial fibrillation, hypertension, dyslipidemia, dementia, and coronary artery disease.  Mr. Pichardo lives at a independent living facility here in [place] and was having physical therapy with a vital sign check  revealing bradycardia 20 to 30 bpm with systolic blood pressure in the 170s. It was recommended that he present to the local emergency room.  Upon presentation he was noted to be in complete heart block 30 to 40 bpm.  He was admitted to the ICU with electrophysiology consultation today per Dr. Granda today.  Upon review of history with patient and family it is reported that he has daily symptoms of increasing weakness, fatigue, SOA, & AGUILERA that are noted to be worse during the morning hours after awakening.  Mr. Pichardo uses a walker for mobility due to gait instability and frequent falls 1-2 times per week with his worst injury occurring approximately 2 weeks ago hitting his head on the kitchen counter.  Patient denies symptoms of palpitations, presyncope, or syncope but the family is unsure if this is why he has frequent falls in light of the patient having dementia and being a poor historian.  The patient does endorse occasional symptoms of chest pain described as a 4/10 in the center of his chest that is transient and relieved with rest.  Patient does report occasionally taking his wife's sublingual nitroglycerin for his symptoms.  He reports taking 2 nitroglycerin 5 days ago with resolution of his symptoms.  Patient admits compliance to his medication therapy with a recent decrease in his Coreg per his PCP for bradycardia which has not helped his slow heart rates.  Patient and family deny any recent signs of infection with fever, sweats, or chills.       PAST MEDICAL HISTORY  Past Medical History:   Diagnosis Date   • Abnormal brain CT 6/2/2016    Overview:  Dilated lateral and 3rd ventricle. Possibly could be atrophy but cannot completely exclude hydrocephaly.   • Atrial fibrillation (CMS/HCC)    • Back pain    • Brain atrophy (CMS/HCC)    • CAD (coronary artery disease)    • Constipation    • Hyperlipidemia    • Hypersomnia    • Melanoma (CMS/HCC)     SCALP   • Memory deficit    • Neuropathy    • Radiculopathy         SURGICAL HISTORY   has a past surgical history that includes Penile prosthesis implant; AV node ablation; Scalp/Neck Tissue Expander Insertion (N/A, 9/11/2018); Cataract extraction; Coronary angioplasty with stent; and Colonoscopy.     SOCIAL HISTORY  Social History     Socioeconomic History   • Marital status:      Spouse name: Not on file   • Number of children: Not on file   • Years of education: Not on file   • Highest education level: Not on file   Tobacco Use   • Smoking status: Never Smoker   • Smokeless tobacco: Never Used   Substance and Sexual Activity   • Alcohol use: No   • Drug use: No   • Sexual activity: Defer       FAMILY HISTORY  family history includes Cancer in his mother; Hypertension in his mother.     MEDICATIONS  Prior to Admission medications    Medication Sig Start Date End Date Taking? Authorizing Provider   carvedilol (COREG) 6.25 MG tablet Take 6.25 mg by mouth 2 (Two) Times a Day With Meals.   Yes Dee Palm MD   cetirizine (zyrTEC) 10 MG tablet Take 10 mg by mouth Daily.   Yes Dee Palm MD   lisinopril (PRINIVIL,ZESTRIL) 10 MG tablet Take 1 tablet by mouth Daily. 8/27/19  Yes Isma May MD   meloxicam (MOBIC) 15 MG tablet Take 1 tablet by mouth Daily As Needed for Mild Pain . 8/27/19  Yes Isma May MD   memantine (NAMENDA) 10 MG tablet Take 10 mg by mouth every night at bedtime.   Yes ProviderDee MD   sertraline (ZOLOFT) 100 MG tablet Take 1 tablet by mouth Daily. 8/27/19  Yes Isma May MD   simvastatin (ZOCOR) 10 MG tablet Take 1 tablet by mouth Every Night. 8/27/19  Yes Isma May MD   nitroglycerin (NITROSTAT) 0.4 MG SL tablet DISSOLVE 1 TABLET UNDER THE TONGUE AS NEEDED FOR CHEST PAIN EVERY 5 MINUTES UP TO 3 TIMES. IF NO RELIEF CALL 911. 10/1/19   Isma May MD       ALLERGIES  No Known Allergies    REVIEW OF SYSTEMS    Constitutional: No fevers or chills, no recent weight gain or weight  "loss, + Weakness, fatigue  Eyes: No visual loss, blurred vision, double vision, yellow sclerae.  ENT: No headaches, hearing loss, vertigo, congestion or sore throat.   Cardiovascular: Per HPI  Respiratory: No cough or wheezing, no sputum production, no hematemesis, + SOA, AGUILERA   Gastrointestinal: No abdominal pain, no nausea, vomiting, constipation, diarrhea, melena.   Genitourinary: No dysuria, hematuria or increased frequency.  Musculoskeletal:  + gait disturbance, weakness   Integumentary: No rashes, urticaria, ulcers or sores.   Neurological: No headache, syncope, paralysis, ataxia, no prior CVA/TIA, + dizziness  Psychiatric: No anxiety, or depression  Endocrine: No diaphoresis, cold or heat intolerance. No polyuria or polydipsia.   Hematologic/Lymphatic: No anemia, abnormal bruising or bleeding. No history of DVT/PE.       Objective     VITAL SIGNS: /84 (BP Location: Left arm, Patient Position: Lying)   Pulse 62   Temp 98.7 °F (37.1 °C) (Oral)   Resp 18   Ht 188 cm (74\")   Wt 86.6 kg (190 lb 14.7 oz)   SpO2 97%   BMI 24.51 kg/m²      ADMIT WEIGHT:  88.5 kg (195 lb)  BMI: Body mass index is 24.51 kg/m².    Admission Weight: 88.5 kg (195 lb)     PHYSICAL EXAM  General appearance: Awake, alert, cooperative, Oriented to person / place  Head: Normocephalic, without obvious abnormality, atraumatic  Eyes: Conjunctivae/corneas clear, EOMs intact  Neck: no adenopathy, no carotid bruit, no JVD and thyroid: not enlarged  Lungs: clear to auscultation bilaterally and no rhonchi or crackles\", ' symmetric  Heart: irregular rate and rhythm, S1, S2 normal, no murmur, click, rub or gallop  Abdomen: Soft, non-tender, bowel sounds normal,  no organomegaly  Extremities: extremities normal, atraumatic, no cyanosis or edema  Skin: Skin color, turgor normal, no rashes or lesions  Neurologic: Grossly normal     CBC:   Results from last 7 days   Lab Units 12/06/19  0548 12/05/19  1604   WBC 10*3/mm3 9.59 7.19   HEMOGLOBIN " g/dL 15.0 14.6   HEMATOCRIT % 47.6 44.2   MCV fL 104.6* 100.7*   PLATELETS 10*3/mm3 150 154         BMP:  Results from last 7 days   Lab Units 12/05/19  1604   POTASSIUM mmol/L 4.7   CHLORIDE mmol/L 105   CO2 mmol/L 24.0   BUN mg/dL 28*   CREATININE mg/dL 1.29*   GLUCOSE mg/dL 98   CALCIUM mg/dL 10.0   MAGNESIUM mg/dL 2.2     PT/INR:   Results from last 7 days   Lab Units 12/06/19  0549   PROTIME Seconds 14.0   INR  1.13     APTT:     MAG:   Results from last 7 days   Lab Units 12/05/19  1604   MAGNESIUM mg/dL 2.2       CURRENT MEDICATIONS:    Current Facility-Administered Medications:   •  acetaminophen (TYLENOL) tablet 650 mg, 650 mg, Oral, Q4H PRN, 650 mg at 12/05/19 2050 **OR** acetaminophen (TYLENOL) suppository 650 mg, 650 mg, Rectal, Q4H PRN, Maribel Meier MD  •  bisacodyl (DULCOLAX) EC tablet 5 mg, 5 mg, Oral, Daily PRN, Maribel Meier MD  •  dextrose 5 % and sodium chloride 0.9 % infusion, 100 mL/hr, Intravenous, Continuous, Maribel Meier MD, Last Rate: 100 mL/hr at 12/05/19 1949, 100 mL/hr at 12/05/19 1949  •  docusate sodium (COLACE) capsule 100 mg, 100 mg, Oral, BID, Maribel Meier MD, 100 mg at 12/05/19 2050  •  famotidine (PEPCID) tablet 20 mg, 20 mg, Oral, BID AC, Maribel Meier MD, 20 mg at 12/05/19 2050  •  heparin (porcine) 5000 UNIT/ML injection 5,000 Units, 5,000 Units, Subcutaneous, Q12H, Maribel Meier MD, 5,000 Units at 12/05/19 2050  •  hydrALAZINE (APRESOLINE) injection 20 mg, 20 mg, Intravenous, Q4H PRN, Maribel Meier MD, 20 mg at 12/06/19 0640  •  isoproterenol (ISUPREL) 1,000 mcg in sodium chloride 0.9 % 250 mL (4 mcg/mL) infusion, 1 mcg/min, Intravenous, Continuous, Annette Pretty, DNP, APRN, Last Rate: 15 mL/hr at 12/05/19 2001, 1 mcg/min at 12/05/19 2001  •  memantine (NAMENDA) tablet 10 mg, 10 mg, Oral, Nightly, Maribel Meier MD, 10 mg at 12/05/19 2050  •  nitroglycerin (NITROSTAT) SL tablet 0.4 mg, 0.4 mg,  Sublingual, Q5 Min PRN, Maribel Meier MD  •  ondansetron (ZOFRAN) tablet 4 mg, 4 mg, Oral, Q6H PRN **OR** ondansetron (ZOFRAN) injection 4 mg, 4 mg, Intravenous, Q6H PRN, Maribel Meier MD  •  rosuvastatin (CRESTOR) tablet 10 mg, 10 mg, Oral, Nightly, Maribel Meier MD, 10 mg at 19  •  sertraline (ZOLOFT) tablet 50 mg, 50 mg, Oral, Nightly, Maribel Meier MD, 50 mg at 19  •  sodium chloride 0.9 % flush 10 mL, 10 mL, Intravenous, PRN, Anand Lopez MD  •  sodium chloride 0.9 % flush 10 mL, 10 mL, Intravenous, Q12H, Maribel Meier MD, 10 mL at 19  •  sodium chloride 0.9 % flush 10 mL, 10 mL, Intravenous, PRN, Maribel Meier MD  CONTINUOUS INFUSIONS:    dextrose 5 % and sodium chloride 0.9 % 100 mL/hr Last Rate: 100 mL/hr (19)   isoproterenol (ISUPREL) infusion 4 mcg/mL 1 mcg/min Last Rate: 1 mcg/min (19)         EK2019 1550 with CHB 37 bpm, incomplete RBBB  ECHO: Limited ECHO ordered for today / not yet performed       TELEMETRY: Atrial Fibrillation 40-50 bpm    Assessment      1.  Complete heart block  2.  Paroxysmal atrial fibrillation  3.  Essential hypertension  4.  Acute renal insufficiency  5.  Coronary artery disease  6.  Dementia    Plan        Mr. Pichardo is an elderly 87-year-old white male with the above noted past medical history who presents with complete heart block not associated with an identified transient or reversible cause.  He has a concerning history of multiple falls 1-2 times a week.  We will plan to proceed with permanent pacemaker implantation for complete heart block.  All risks, benefits, and alternatives to the procedure were discussed with the patient/family and they are willing to proceed at this time.    Electronically signed by RICH Saleem, 19, 9:31 AM.      This note was completed using a voice transcription system. Every effort was made to ensure  accuracy. However, inadvertent computerized transcription errors may be present.

## 2019-12-06 NOTE — PLAN OF CARE
Problem: Patient Care Overview  Goal: Plan of Care Review  Outcome: Ongoing (interventions implemented as appropriate)   12/06/19 4122   Coping/Psychosocial   Plan of Care Reviewed With patient;spouse;daughter;family   Plan of Care Review   Progress no change   OTHER   Outcome Summary Pt scheduled for dual chamber PPM. BP stable. HR between 34-60s in 2:1 AV Block and 2nd Degree Type 2 AVB. NPO since breakfast. Continues to be confused, requires frequent reorientation.       Problem: Fall Risk (Adult)  Goal: Identify Related Risk Factors and Signs and Symptoms  Outcome: Outcome(s) achieved Date Met: 12/06/19    Goal: Absence of Fall  Outcome: Ongoing (interventions implemented as appropriate)      Problem: Arrhythmia/Dysrhythmia (Symptomatic) (Adult)  Goal: Signs and Symptoms of Listed Potential Problems Will be Absent, Minimized or Managed (Arrhythmia/Dysrhythmia)  Outcome: Ongoing (interventions implemented as appropriate)

## 2019-12-06 NOTE — PROGRESS NOTES
INTENSIVIST   PROGRESS NOTE     Hospital:  LOS: 1 day     Mr. Ayan Pichardo, 87 y.o. male is followed for a Chief Complaint of: Bradycardia      Subjective   S     Interval History:  Remains on Isuprel for heart rate support.        The patient's relevant past medical, surgical and social history were reviewed and updated in Epic as appropriate.      ROS:   Constitutional: Negative for fever.   Respiratory: Negative for dyspnea.   Cardiovascular: Negative for chest pain.   Gastrointestinal: Negative for  nausea, vomiting and diarrhea.     Objective   O     Vitals:  Temp  Min: 97.4 °F (36.3 °C)  Max: 98.7 °F (37.1 °C)  BP  Min: 104/56  Max: 229/100  Pulse  Min: 28  Max: 73  Resp  Min: 12  Max: 20  SpO2  Min: 89 %  Max: 99 % No Data Recorded    Intake/Ouptut 24 hrs (7:00AM - 6:59 AM)  Intake & Output (last 3 days)       12/03 0701 - 12/04 0700 12/04 0701 - 12/05 0700 12/05 0701 - 12/06 0700 12/06 0701 - 12/07 0700    P.O.    360    I.V. (mL/kg)   1189.6 (13.7) 741 (8.6)    Total Intake(mL/kg)   1189.6 (13.7) 1101 (12.7)    Urine (mL/kg/hr)   100     Total Output   100     Net   +1089.6 +1101                  Medications (drips):    dextrose 5 % and sodium chloride 0.9 % Last Rate: 100 mL/hr (12/05/19 1949)   isoproterenol (ISUPREL) infusion 4 mcg/mL Last Rate: 1 mcg/min (12/05/19 2001)         Physical Examination  Telemetry:  Sinus bradycardia.    Constitutional:  No acute distress.   Cardiovascular: Bradycardia. Regular rhythm. Normal heart sounds.  No murmurs, gallop or rub.   Respiratory: No respiratory distress. Normal respiratory effort.  Normal breath sounds  Clear to ascultation.    Abdominal:  Soft. No masses. Non-tender. No distension. No HSM.   Extremities: No digital cyanosis. No clubbing.  No peripheral edema.   Neurological:   Alert but intermittently confused.               Results from last 7 days   Lab Units 12/06/19  0548 12/05/19  1604   WBC 10*3/mm3 9.59 7.19   HEMOGLOBIN g/dL 15.0 14.6   MCV fL  104.6* 100.7*   PLATELETS 10*3/mm3 150 154     Results from last 7 days   Lab Units 12/05/19  1604   SODIUM mmol/L 141   POTASSIUM mmol/L 4.7   CO2 mmol/L 24.0   CREATININE mg/dL 1.29*   GLUCOSE mg/dL 98   MAGNESIUM mg/dL 2.2     Estimated Creatinine Clearance: 49.4 mL/min (A) (by C-G formula based on SCr of 1.29 mg/dL (H)).  Results from last 7 days   Lab Units 12/05/19  1604   ALK PHOS U/L 89   BILIRUBIN mg/dL 0.4   ALT (SGPT) U/L 12   AST (SGOT) U/L 23             Images:  Imaging Results (Last 24 Hours)     Procedure Component Value Units Date/Time    XR Chest 1 View [[id number]] Collected:  12/05/19 1610     Updated:  12/05/19 1700    Narrative:       EXAMINATION: XR CHEST 1 VW-12/05/2019:      INDICATION: Dysrhythmia, triage protocol.      COMPARISON: NONE.     FINDINGS: AP chest radiograph was performed in two acquisitions to image  the entire chest. Chronic changes in the lungs are identified. Aortic  arch calcifications are noted. There is mild prominence of aortic arch  noted. Likely calcified right mid lung granuloma noted. No evidence of  active airspace disease. The visualized upper abdomen is unrevealing. No  acute osseous abnormality identified.       Impression:       1.  No convincing evidence of active airspace disease in the setting of  chronic lung changes.  2.  There is mild prominence of the aortic arch suggested. Consider  follow-up CT imaging of the chest for further evaluation.     D:  12/05/2019  E:  12/05/2019     This report was finalized on 12/5/2019 4:57 PM by Dr. Abdulkadir Kuo MD.               Results: Reviewed.  I reviewed the patient's new laboratory and imaging results.  I independently reviewed the patient's new images.    Medications: Reviewed.    Assessment/Plan   A / P     Mr. Pichardo is an 88yo M with a history of dementia, Afib s/p ablation, CAD and hypertension who presented with bradycardia. He was placed on Isuprel for heart rate support.     Nutrition:   NPO  Diet  Advance Directives:   Code Status and Medical Interventions:   Ordered at: 12/05/19 1920     Code Status:    CPR     Medical Interventions (Level of Support Prior to Arrest):    Full       Active Hospital Problems    Diagnosis   • **Symptomatic bradycardia   • Mobitz type 2 second degree AV block   • Alzheimer's dementia on memantine    • Acute renal insufficiency   • Second degree type II atrioventricular block   • Benign essential hypertension   • Paroxysmal atrial fibrillation (CMS/HCC)   • Atrial fibrillation not on anticoagulation    • CAD    • Hyperlipidemia       Assessment / Plan:    1. Continue to monitor in the ICU  2. Continue Isuprel.   3. EP Cardiology following and planning for PPM placement this afternoon.   4. Continue to monitor renal function.   5. Hydralazine for BP control. Restart home Lisinopril when renal function improves.   6. Home medications reviewed  7. AM labs    High level of risk due to:  severe exacerbation of chronic illness and illness with threat to life or bodily function.    Plan of care and goals reviewed during interdisciplinary rounds.  I discussed the patient's findings and my recommendations with patient, family and nursing staff    Esthela Galarza, DO    Intensive Care Medicine and Pulmonary Medicine

## 2019-12-06 NOTE — PLAN OF CARE
Problem: Patient Care Overview  Goal: Plan of Care Review  Outcome: Ongoing (interventions implemented as appropriate)   12/06/19 0450   Coping/Psychosocial   Plan of Care Reviewed With patient;family;spouse   Plan of Care Review   Progress no change   OTHER   Outcome Summary Patient admitted with bradycardia and hypertension, improvement in bradycardia with isuprel and improvement in hypertension with one dose of hydralazine, awaiting cardiology consult. Patient became agitated and confused during the night, but was calmed when told his wife would be here in the am, other VSS, will continue to monitor       Problem: Fall Risk (Adult)  Goal: Identify Related Risk Factors and Signs and Symptoms  Outcome: Ongoing (interventions implemented as appropriate)   12/06/19 0450   Fall Risk (Adult)   Related Risk Factors (Fall Risk) age-related changes;confusion/agitation;fatigue/slow reaction;gait/mobility problems;history of falls;homeostatic imbalance;environment unfamiliar   Signs and Symptoms (Fall Risk) presence of risk factors     Goal: Absence of Fall  Outcome: Ongoing (interventions implemented as appropriate)   12/06/19 0450   Fall Risk (Adult)   Absence of Fall making progress toward outcome       Problem: Arrhythmia/Dysrhythmia (Symptomatic) (Adult)  Goal: Signs and Symptoms of Listed Potential Problems Will be Absent, Minimized or Managed (Arrhythmia/Dysrhythmia)  Outcome: Ongoing (interventions implemented as appropriate)   12/06/19 0450   Goal/Outcome Evaluation   Problems Assessed (Arrhythmia/Dysrhythmia) all   Problems Present (Dysrhythmia) electrophysiologic conduction defect

## 2019-12-07 ENCOUNTER — APPOINTMENT (OUTPATIENT)
Dept: GENERAL RADIOLOGY | Facility: HOSPITAL | Age: 84
End: 2019-12-07

## 2019-12-07 LAB
ANION GAP SERPL CALCULATED.3IONS-SCNC: 13 MMOL/L (ref 5–15)
BH CV ECHO MEAS - AO MAX PG (FULL): 8.4 MMHG
BH CV ECHO MEAS - AO MAX PG: 13.2 MMHG
BH CV ECHO MEAS - AO MEAN PG (FULL): 4.4 MMHG
BH CV ECHO MEAS - AO MEAN PG: 7 MMHG
BH CV ECHO MEAS - AO ROOT AREA (BSA CORRECTED): 1.6
BH CV ECHO MEAS - AO ROOT AREA: 9 CM^2
BH CV ECHO MEAS - AO ROOT DIAM: 3.4 CM
BH CV ECHO MEAS - AO V2 MAX: 181.9 CM/SEC
BH CV ECHO MEAS - AO V2 MEAN: 121 CM/SEC
BH CV ECHO MEAS - AO V2 VTI: 30.3 CM
BH CV ECHO MEAS - AVA(I,A): 2.5 CM^2
BH CV ECHO MEAS - AVA(I,D): 2.5 CM^2
BH CV ECHO MEAS - AVA(V,A): 1.9 CM^2
BH CV ECHO MEAS - AVA(V,D): 1.9 CM^2
BH CV ECHO MEAS - BSA(HAYCOCK): 2.1 M^2
BH CV ECHO MEAS - BSA: 2.1 M^2
BH CV ECHO MEAS - BZI_BMI: 24.4 KILOGRAMS/M^2
BH CV ECHO MEAS - BZI_METRIC_HEIGHT: 188 CM
BH CV ECHO MEAS - BZI_METRIC_WEIGHT: 86.2 KG
BH CV ECHO MEAS - EDV(CUBED): 74.8 ML
BH CV ECHO MEAS - EDV(MOD-SP2): 78 ML
BH CV ECHO MEAS - EDV(MOD-SP4): 85 ML
BH CV ECHO MEAS - EDV(TEICH): 79.2 ML
BH CV ECHO MEAS - EF(CUBED): 63.1 %
BH CV ECHO MEAS - EF(MOD-BP): 54 %
BH CV ECHO MEAS - EF(MOD-SP2): 55.1 %
BH CV ECHO MEAS - EF(MOD-SP4): 50.6 %
BH CV ECHO MEAS - EF(TEICH): 55 %
BH CV ECHO MEAS - ESV(CUBED): 27.6 ML
BH CV ECHO MEAS - ESV(MOD-SP2): 35 ML
BH CV ECHO MEAS - ESV(MOD-SP4): 42 ML
BH CV ECHO MEAS - ESV(TEICH): 35.6 ML
BH CV ECHO MEAS - FS: 28.3 %
BH CV ECHO MEAS - IVS/LVPW: 0.99
BH CV ECHO MEAS - IVSD: 0.93 CM
BH CV ECHO MEAS - LV DIASTOLIC VOL/BSA (35-75): 40 ML/M^2
BH CV ECHO MEAS - LV MASS(C)D: 126.5 GRAMS
BH CV ECHO MEAS - LV MASS(C)DI: 59.5 GRAMS/M^2
BH CV ECHO MEAS - LV MAX PG: 4.8 MMHG
BH CV ECHO MEAS - LV MEAN PG: 2.6 MMHG
BH CV ECHO MEAS - LV SYSTOLIC VOL/BSA (12-30): 19.8 ML/M^2
BH CV ECHO MEAS - LV V1 MAX: 109.8 CM/SEC
BH CV ECHO MEAS - LV V1 MEAN: 71.2 CM/SEC
BH CV ECHO MEAS - LV V1 VTI: 23.8 CM
BH CV ECHO MEAS - LVIDD: 4.2 CM
BH CV ECHO MEAS - LVIDS: 3 CM
BH CV ECHO MEAS - LVLD AP2: 8.3 CM
BH CV ECHO MEAS - LVLD AP4: 7.6 CM
BH CV ECHO MEAS - LVLS AP2: 6.9 CM
BH CV ECHO MEAS - LVLS AP4: 7.5 CM
BH CV ECHO MEAS - LVOT AREA (M): 3.1 CM^2
BH CV ECHO MEAS - LVOT AREA: 3.1 CM^2
BH CV ECHO MEAS - LVOT DIAM: 2 CM
BH CV ECHO MEAS - LVPWD: 0.94 CM
BH CV ECHO MEAS - MV DEC SLOPE: 902.6 CM/SEC^2
BH CV ECHO MEAS - MV MAX PG: 11.5 MMHG
BH CV ECHO MEAS - MV MEAN PG: 3 MMHG
BH CV ECHO MEAS - MV P1/2T MAX VEL: 177.8 CM/SEC
BH CV ECHO MEAS - MV P1/2T: 57.7 MSEC
BH CV ECHO MEAS - MV V2 MAX: 169.7 CM/SEC
BH CV ECHO MEAS - MV V2 MEAN: 73.6 CM/SEC
BH CV ECHO MEAS - MV V2 VTI: 37.8 CM
BH CV ECHO MEAS - MVA P1/2T LCG: 1.2 CM^2
BH CV ECHO MEAS - MVA(P1/2T): 3.8 CM^2
BH CV ECHO MEAS - MVA(VTI): 2 CM^2
BH CV ECHO MEAS - RAP SYSTOLE: 8 MMHG
BH CV ECHO MEAS - RVSP: 23 MMHG
BH CV ECHO MEAS - SI(AO): 128.5 ML/M^2
BH CV ECHO MEAS - SI(CUBED): 22.2 ML/M^2
BH CV ECHO MEAS - SI(LVOT): 35.1 ML/M^2
BH CV ECHO MEAS - SI(MOD-SP2): 20.2 ML/M^2
BH CV ECHO MEAS - SI(MOD-SP4): 20.2 ML/M^2
BH CV ECHO MEAS - SI(TEICH): 20.5 ML/M^2
BH CV ECHO MEAS - SV(AO): 273.1 ML
BH CV ECHO MEAS - SV(CUBED): 47.2 ML
BH CV ECHO MEAS - SV(LVOT): 74.6 ML
BH CV ECHO MEAS - SV(MOD-SP2): 43 ML
BH CV ECHO MEAS - SV(MOD-SP4): 43 ML
BH CV ECHO MEAS - SV(TEICH): 43.6 ML
BH CV ECHO MEAS - TR MAX PG: 15 MMHG
BH CV ECHO MEAS - TR MAX VEL: 191.1 CM/SEC
BH CV VAS BP LEFT ARM: NORMAL MMHG
BUN BLD-MCNC: 29 MG/DL (ref 8–23)
BUN/CREAT SERPL: 21.6 (ref 7–25)
CALCIUM SPEC-SCNC: 9.5 MG/DL (ref 8.6–10.5)
CHLORIDE SERPL-SCNC: 112 MMOL/L (ref 98–107)
CO2 SERPL-SCNC: 16 MMOL/L (ref 22–29)
CREAT BLD-MCNC: 1.34 MG/DL (ref 0.76–1.27)
DEPRECATED RDW RBC AUTO: 51.2 FL (ref 37–54)
ERYTHROCYTE [DISTWIDTH] IN BLOOD BY AUTOMATED COUNT: 12.9 % (ref 12.3–15.4)
GFR SERPL CREATININE-BSD FRML MDRD: 50 ML/MIN/1.73
GLUCOSE BLD-MCNC: 102 MG/DL (ref 65–99)
HCT VFR BLD AUTO: 46.4 % (ref 37.5–51)
HGB BLD-MCNC: 14.3 G/DL (ref 13–17.7)
LV EF 2D ECHO EST: 55 %
MAGNESIUM SERPL-MCNC: 2.1 MG/DL (ref 1.6–2.4)
MCH RBC QN AUTO: 32.9 PG (ref 26.6–33)
MCHC RBC AUTO-ENTMCNC: 30.8 G/DL (ref 31.5–35.7)
MCV RBC AUTO: 106.9 FL (ref 79–97)
PHOSPHATE SERPL-MCNC: 2.3 MG/DL (ref 2.5–4.5)
PLATELET # BLD AUTO: 127 10*3/MM3 (ref 140–450)
PMV BLD AUTO: 10.1 FL (ref 6–12)
POTASSIUM BLD-SCNC: 4.1 MMOL/L (ref 3.5–5.2)
RBC # BLD AUTO: 4.34 10*6/MM3 (ref 4.14–5.8)
SODIUM BLD-SCNC: 141 MMOL/L (ref 136–145)
WBC NRBC COR # BLD: 9.54 10*3/MM3 (ref 3.4–10.8)

## 2019-12-07 PROCEDURE — 80048 BASIC METABOLIC PNL TOTAL CA: CPT | Performed by: INTERNAL MEDICINE

## 2019-12-07 PROCEDURE — 99233 SBSQ HOSP IP/OBS HIGH 50: CPT | Performed by: INTERNAL MEDICINE

## 2019-12-07 PROCEDURE — 71046 X-RAY EXAM CHEST 2 VIEWS: CPT

## 2019-12-07 PROCEDURE — 25010000002 HYDRALAZINE PER 20 MG: Performed by: INTERNAL MEDICINE

## 2019-12-07 PROCEDURE — 99024 POSTOP FOLLOW-UP VISIT: CPT | Performed by: INTERNAL MEDICINE

## 2019-12-07 PROCEDURE — 84100 ASSAY OF PHOSPHORUS: CPT | Performed by: INTERNAL MEDICINE

## 2019-12-07 PROCEDURE — 85027 COMPLETE CBC AUTOMATED: CPT | Performed by: INTERNAL MEDICINE

## 2019-12-07 PROCEDURE — 93005 ELECTROCARDIOGRAM TRACING: CPT | Performed by: INTERNAL MEDICINE

## 2019-12-07 PROCEDURE — 93010 ELECTROCARDIOGRAM REPORT: CPT | Performed by: INTERNAL MEDICINE

## 2019-12-07 PROCEDURE — 83735 ASSAY OF MAGNESIUM: CPT | Performed by: INTERNAL MEDICINE

## 2019-12-07 RX ORDER — LISINOPRIL 10 MG/1
10 TABLET ORAL
Status: DISCONTINUED | OUTPATIENT
Start: 2019-12-07 | End: 2019-12-11 | Stop reason: HOSPADM

## 2019-12-07 RX ORDER — CARVEDILOL 6.25 MG/1
6.25 TABLET ORAL ONCE
Status: COMPLETED | OUTPATIENT
Start: 2019-12-07 | End: 2019-12-07

## 2019-12-07 RX ORDER — CARVEDILOL 6.25 MG/1
6.25 TABLET ORAL 2 TIMES DAILY WITH MEALS
Status: DISCONTINUED | OUTPATIENT
Start: 2019-12-07 | End: 2019-12-08

## 2019-12-07 RX ADMIN — DOCUSATE SODIUM 100 MG: 100 CAPSULE, LIQUID FILLED ORAL at 08:18

## 2019-12-07 RX ADMIN — DOCUSATE SODIUM 100 MG: 100 CAPSULE, LIQUID FILLED ORAL at 20:41

## 2019-12-07 RX ADMIN — HYDRALAZINE HYDROCHLORIDE 20 MG: 20 INJECTION INTRAMUSCULAR; INTRAVENOUS at 07:28

## 2019-12-07 RX ADMIN — SODIUM CHLORIDE, PRESERVATIVE FREE 10 ML: 5 INJECTION INTRAVENOUS at 20:44

## 2019-12-07 RX ADMIN — ACETAMINOPHEN 650 MG: 325 TABLET ORAL at 08:18

## 2019-12-07 RX ADMIN — HYDRALAZINE HYDROCHLORIDE 20 MG: 20 INJECTION INTRAMUSCULAR; INTRAVENOUS at 22:25

## 2019-12-07 RX ADMIN — FAMOTIDINE 20 MG: 20 TABLET ORAL at 18:15

## 2019-12-07 RX ADMIN — CARVEDILOL 6.25 MG: 6.25 TABLET, FILM COATED ORAL at 14:31

## 2019-12-07 RX ADMIN — ROSUVASTATIN CALCIUM 10 MG: 10 TABLET, COATED ORAL at 20:41

## 2019-12-07 RX ADMIN — FAMOTIDINE 20 MG: 20 TABLET ORAL at 08:17

## 2019-12-07 RX ADMIN — MEMANTINE 10 MG: 10 TABLET ORAL at 20:41

## 2019-12-07 RX ADMIN — SODIUM CHLORIDE, PRESERVATIVE FREE 3 ML: 5 INJECTION INTRAVENOUS at 08:19

## 2019-12-07 RX ADMIN — SERTRALINE HYDROCHLORIDE 50 MG: 50 TABLET ORAL at 20:41

## 2019-12-07 RX ADMIN — CARVEDILOL 6.25 MG: 6.25 TABLET, FILM COATED ORAL at 20:41

## 2019-12-07 RX ADMIN — LISINOPRIL 10 MG: 10 TABLET ORAL at 14:31

## 2019-12-07 NOTE — PLAN OF CARE
Problem: Patient Care Overview  Goal: Plan of Care Review  Outcome: Ongoing (interventions implemented as appropriate)  Flowsheets  Taken 12/7/2019 1801  Progress: improving  Taken 12/7/2019 1600  Plan of Care Reviewed With: patient;spouse;family  Note:   Pt remained calm throughout shift. Restraints were d/c'd. BP medications were restarted per cardiology. HR has remained above 60 and paced. Pt got up to chair at 1600, required 3 people assisting. Family at bedside. VSS. Will continue to monitor.   Goal: Individualization and Mutuality  Outcome: Ongoing (interventions implemented as appropriate)  Goal: Discharge Needs Assessment  Outcome: Ongoing (interventions implemented as appropriate)  Goal: Interprofessional Rounds/Family Conf  Outcome: Ongoing (interventions implemented as appropriate)     Problem: Fall Risk (Adult)  Goal: Absence of Fall  Outcome: Ongoing (interventions implemented as appropriate)  Flowsheets (Taken 12/7/2019 1801)  Absence of Fall: achieves outcome     Problem: Restraint, Nonbehavioral (Nonviolent)  Goal: Rationale and Justification  Outcome: Ongoing (interventions implemented as appropriate)  Flowsheets (Taken 12/7/2019 0412 by Vicenta Sánchez RN)  Rationale and Justification: prevent harm to self;prevent line/tube removal;failure of less restrictive safety measures  Goal: Nonbehavioral (Nonviolent) Restraint: Absence of Injury/Harm  Outcome: Ongoing (interventions implemented as appropriate)  Goal: Nonbehavioral (Nonviolent) Restraint: Achievement of Discontinuation Criteria  Outcome: Ongoing (interventions implemented as appropriate)  Goal: Nonbehavioral (Nonviolent) Restraint: Preservation of Dignity and Wellbeing  Outcome: Ongoing (interventions implemented as appropriate)

## 2019-12-07 NOTE — SIGNIFICANT NOTE
I have reviewed the need for additional restraints, patient condition, and least restrictive alternatives have been explored.    Maegan Kulkarni RN  Clinical House Supervisor

## 2019-12-07 NOTE — PROGRESS NOTES
[facility] at [facility]    Inpatient Progress Note      Chief Complaint/Reason for service:    · Heart block         Subjective:       Confused overnight but improved with Haldol.  Mentating at baseline according to the patient's family members today.  Denies discomfort at his pacemaker site.  Breathing comfortably at rest.    Past medical, surgical, social and family history reviewed in the patient's electronic medical record.    Review of Systems:   Positive for confusion overnight  Negative for exertional chest pain, dyspnea with exertion, lower extremity edema, palpitations     Problem List  Active Hospital Problems    Diagnosis  POA   • **Symptomatic bradycardia (S/P PPM 12/6/19) [R00.1]  Yes   • Mobitz type 2 second degree AV block [I44.1]  Yes   • Alzheimer's dementia on memantine  [G30.9, F02.80]  Yes   • Acute renal insufficiency [N28.9]  Yes   • Second degree type II atrioventricular block [I44.1]  Yes   • Benign essential hypertension [I10]  Yes   • Paroxysmal atrial fibrillation (CMS/HCC) [I48.0]  Yes   • Atrial fibrillation not on anticoagulation  [I48.91]  Yes   • CAD  [I25.10]  Yes   • Hyperlipidemia [E78.5]  Yes      Resolved Hospital Problems   No resolved problems to display.            Objective:      Current Facility-Administered Medications:   •  acetaminophen (TYLENOL) tablet 650 mg, 650 mg, Oral, Q4H PRN, 650 mg at 12/07/19 0818 **OR** acetaminophen (TYLENOL) suppository 650 mg, 650 mg, Rectal, Q4H PRN, Maribel Meier MD  •  bisacodyl (DULCOLAX) EC tablet 5 mg, 5 mg, Oral, Daily PRN, Maribel Meier MD  •  carvedilol (COREG) tablet 6.25 mg, 6.25 mg, Oral, BID With Meals, Cate Soto, APRN  •  cetirizine (zyrTEC) tablet 10 mg, 10 mg, Oral, Daily, Case, Esthela V., DO  •  dextrose 5 % and sodium chloride 0.9 % infusion, 100 mL/hr, Intravenous, Continuous, Maribel Meier MD, Last Rate: 100 mL/hr at 12/05/19 1949, 100 mL/hr at 12/05/19 1949  •   docusate sodium (COLACE) capsule 100 mg, 100 mg, Oral, BID, Maribel Meier MD, 100 mg at 12/07/19 0818  •  famotidine (PEPCID) tablet 20 mg, 20 mg, Oral, BID AC, Maribel Meier MD, 20 mg at 12/07/19 0817  •  hydrALAZINE (APRESOLINE) injection 20 mg, 20 mg, Intravenous, Q4H PRN, Maribel Meier MD, 20 mg at 12/07/19 0728  •  lisinopril (PRINIVIL,ZESTRIL) tablet 10 mg, 10 mg, Oral, Q24H, Cate Soto APRN, 10 mg at 12/07/19 1431  •  memantine (NAMENDA) tablet 10 mg, 10 mg, Oral, Nightly, Maribel Meier MD, 10 mg at 12/06/19 2129  •  nitroglycerin (NITROSTAT) SL tablet 0.4 mg, 0.4 mg, Sublingual, Q5 Min PRN, Maribel Meier MD  •  ondansetron (ZOFRAN) tablet 4 mg, 4 mg, Oral, Q6H PRN **OR** ondansetron (ZOFRAN) injection 4 mg, 4 mg, Intravenous, Q6H PRN, Maribel Meier MD  •  rosuvastatin (CRESTOR) tablet 10 mg, 10 mg, Oral, Nightly, Maribel Meier MD, 10 mg at 12/06/19 2129  •  sertraline (ZOLOFT) tablet 50 mg, 50 mg, Oral, Nightly, Maribel Meier MD, 50 mg at 12/06/19 2129  •  sodium chloride 0.9 % flush 10 mL, 10 mL, Intravenous, Q12H, Maribel Meier MD, 10 mL at 12/05/19 2056  •  sodium chloride 0.9 % flush 10 mL, 10 mL, Intravenous, PRN, Lieghton Granda DO  •  sodium chloride 0.9 % flush 3 mL, 3 mL, Intravenous, Q12H, Curt aPte APRN, 3 mL at 12/07/19 0819  •  sodium chloride 0.9 % flush 3 mL, 3 mL, Intravenous, Q12H, Leighton Granda DO    Vital Sign Min/Max for last 24 hours  Temp  Min: 98.7 °F (37.1 °C)  Max: 99.3 °F (37.4 °C)   BP  Min: 91/74  Max: 198/97   Pulse  Min: 47  Max: 111   Resp  Min: 18  Max: 21   SpO2  Min: 90 %  Max: 99 %   No data recorded    No intake or output data in the 24 hours ending 12/07/19 3157        CONSTITUTIONAL: No acute distress  RESPIRATORY: Normal effort. Clear to auscultation bilaterally without wheezing or rales  CARDIOVASCULAR: Irregularly irregular heart rate and rhythm with normal S1 and  S2. Without murmur, gallop or rub. Normal radial pulses bilaterally. There is no peripheral edema bilaterally.  PSYCH: Normal affect    Results Review:   Lab Results   Component Value Date    TROPONINI <0.012 09/02/2018    TROPONINT 0.072 (C) 12/06/2019       BUN   Date Value Ref Range Status   12/07/2019 29 (H) 8 - 23 mg/dL Final     Creatinine   Date Value Ref Range Status   12/07/2019 1.34 (H) 0.76 - 1.27 mg/dL Final     Potassium   Date Value Ref Range Status   12/07/2019 4.1 3.5 - 5.2 mmol/L Final     ALT (SGPT)   Date Value Ref Range Status   12/05/2019 12 1 - 41 U/L Final     Comment:     Specimen hemolyzed.  Results may be affected.     AST (SGOT)   Date Value Ref Range Status   12/05/2019 23 1 - 40 U/L Final     Comment:     Specimen hemolyzed.  Results may be affected.       No results found for: CHOL  Lab Results   Component Value Date    TRIG 111 05/15/2019     Lab Results   Component Value Date    HDL 30 (L) 05/15/2019     No results found for: LDLC  Lab Results   Component Value Date    LDL 99 05/15/2019     No components found for: LDLDIRECTC        Tele: Atrial fibrillation with ventricular pacing         Assessment/Plan:     ASSESSMENT:  -Heart block  -Paroxysmal atrial fibrillation status post atrial flutter and fib ablation 2015  -Essential hypertension  -CAD JESSICA to RCA and LAD in 2011  -History of systolic heart failure  -Dementia  -Acute renal insufficiency    PLAN:  -Restart home Coreg and lisinopril  -If by tomorrow the patient's heart rate, blood pressure, and mental status is stable, would recommend discharge to his independent living home.  -Pacemaker care discharge instructions as per Dr. Granda's operative note.  I have asked the nurse to print out a copy for the patient.    Micheal Van MD, MSc, [facility]  Interventional Cardiology  [facility] at [facility]  12/7/2019

## 2019-12-07 NOTE — NURSING NOTE
RN called by cath lab with a request to bring patient's spouse to the cath lab to help calm the patient down.  When RN and spouse arrived, patient was very agitated and combative and cath lab staff was holding the patient in the bed so that he would not injure his surgical site or dislodge his pacemaker.  The NP was called at 2030 and restraints were applied to the patient and he was transported to the floor accompanied by RN, family and cath lab.      In the patient's room, family, the MD, [facility], charge RN and cath lab staff worked to calm the patient down and safely restrain him, the NP was in the room to assess the patient and RN received new orders.  Patient calmed enough for staff and family to step out the room and discuss his plan of care.  Family in agreement with restraints until patient has calmed and can follow directions.  Will continue to monitor.

## 2019-12-07 NOTE — PROGRESS NOTES
Intensive Care Follow-up      LOS: 2 days     Mr. Ayan Pichardo, 87 y.o. male is followed for: Glycemic, Electrolyte, Respiratory, and Medical management     Subjective - Interval History     Mr. Pichardo is an 88yo M with a history of dementia, Afib s/p ablation, CAD and hypertension who presented with bradycardia. He was placed on Isuprel for heart rate support.   And underwent permanent pacemaker placement on 12/6/2019.  Postprocedure ICU stay complicated by delirium and agitation.    Interval history    Confused and combative last night  Much improved following Haldol  Systolic and diastolic blood pressure remain high  No continuous infusions  Oxygenating adequately on 2 L/min via nasal cannula    The patient's relevant past medical, surgical and social history were reviewed and updated in Epic as appropriate.     Objective     Infusions:    dextrose 5 % and sodium chloride 0.9 % 100 mL/hr Last Rate: 100 mL/hr (12/05/19 1949)     Medications:    cetirizine 10 mg Oral Daily   docusate sodium 100 mg Oral BID   famotidine 20 mg Oral BID AC   memantine 10 mg Oral Nightly   rosuvastatin 10 mg Oral Nightly   sertraline 50 mg Oral Nightly   sodium chloride 10 mL Intravenous Q12H   sodium chloride 3 mL Intravenous Q12H   sodium chloride 3 mL Intravenous Q12H     Intake/Output       12/06/19 0700 - 12/07/19 0659    Intake (ml) 1101    Output (ml) --    Net (ml) 1101    Last Weight  86.2 kg (190 lb)        Vital Sign Min/Max for last 24 hours  Temp  Min: 98.7 °F (37.1 °C)  Max: 98.9 °F (37.2 °C)   BP  Min: 91/74  Max: 198/97   Pulse  Min: 45  Max: 111   Resp  Min: 16  Max: 21   SpO2  Min: 90 %  Max: 99 %   No data recorded        Physical Exam:   GENERAL: Awake, no distress   HEENT: Pacemaker site without bleeding.  FemoStop in place   LUNGS: No wheezes or rhonchi but decreased breath sounds bilaterally and crackles   HEART: Irregular rate and rhythm without murmurs   GI: Soft, nontender   EXTREMITIES: No clubbing,  cyanosis, or edema   NEURO/PSYCH: Currently somewhat lethargic but arousable.  Follows simple commands.  Moves all fours    Results from last 7 days   Lab Units 12/07/19  0420 12/06/19  0548 12/05/19  1604   WBC 10*3/mm3 9.54 9.59 7.19   HEMOGLOBIN g/dL 14.3 15.0 14.6   PLATELETS 10*3/mm3 127* 150 154     Results from last 7 days   Lab Units 12/07/19  0420 12/06/19  1201 12/05/19  1604   SODIUM mmol/L 141 145 141   POTASSIUM mmol/L 4.1 3.9 4.7   CO2 mmol/L 16.0* 20.0* 24.0   BUN mg/dL 29* 31* 28*   CREATININE mg/dL 1.34* 1.57* 1.29*   MAGNESIUM mg/dL 2.1 2.0 2.2   PHOSPHORUS mg/dL 2.3* 2.7  --    GLUCOSE mg/dL 102* 101* 98     Estimated Creatinine Clearance: 47.4 mL/min (A) (by C-G formula based on SCr of 1.34 mg/dL (H)).              No results found for: LACTATE       Images: Preprocedure chest x-ray revealed no consolidation or effusions    I reviewed the patient's results and images.     Impression      Active Hospital Problems    Diagnosis   • **Symptomatic bradycardia (S/P PPM 12/6/19)   • Mobitz type 2 second degree AV block   • Alzheimer's dementia on memantine    • Acute renal insufficiency   • Second degree type II atrioventricular block   • Benign essential hypertension   • Paroxysmal atrial fibrillation (CMS/HCC)   • Atrial fibrillation not on anticoagulation    • CAD    • Hyperlipidemia         Plan        Restart beta-blocker when okay with cardiology  Better blood pressure control  Continue Namenda  Continue as needed medication for delirium and psychosis     Plan of care and goals reviewed with Multidisciplinary Team and Antibiotic Stewardship rounds   I discussed the patient's findings and my recommendations with patient, family and nursing staff      High level of risk due to:  abrupt change in neurological status and drugs requiring intensive monitoring for toxicity.     ROB Leblanc MD  Pulmonary and Critical Care Medicine

## 2019-12-07 NOTE — OP NOTE
Cardiac Electrophysiology Procedure Note       [facility]     PROCEDURE: PERMANENT PACEMAKER    OPERATION PERFORMED:     Implantation of BSCI L311 model, [id number] serial number pulse generator at the left prepectoral site.    Atrial lead BSCI 7741, 52 cm, serial number  [id number].    Right ventricular lead 7742, 59 cm, serial number [id number].     ATTENDING SURGEON: Leighton Granda, DO     MODERATE SEDATION FOR PROCEDURE:    Moderate sedation was given during this procedure.    I supervised and directed Angela LIU to administer this sedation.  This staff member also monitored the patient's hemodynamic and respiratory status and response to these medications.  Please see the full detailed procedure report generated by the electrophysiology laboratory staff.  The patient tolerated moderate sedation well.  There were no complications regarding sedation.  The total dose of fentanyl was 0 mcg and the total dose of midazolam was 2 mg.  The total dose of Brevital was 13 mg.  First sedation was administered at 1850 and continued through 1955.    ESTIMATED BLOOD LOSS: less than 20cc    RADIATION EXPOSURE: 1.8 minutes and 1 milliGray.    COMPLICATIONS: None.    TIME OUT: Time out was completed with verification of the correct patient identity, procedure to be performed, procedure site and implanted equipment.    INDICATION FOR PROCEDURE:  Briefly, Ayan Pichardo is a 87 y.o. year old male with a history of symptomatic complete heart block.     PROCEDURE AND FINDINGS:  The patient was brought to the electrophysiology laboratory in a post absorptive state.  Informed consent was given by the POA ( Daughter ) prior to the procedure and confirmed.  Intravenous prophylactic antibiotics were administered prior to the procedure.  After the site of implantation was prepped and draped in the usual sterile fashion and after adequate anesthesia was given, the skin was infiltrated with 1% lidocaine and 0.5%  bupivicaine 50/50 mixture.  The skin was incised with a #10 scalpel.  Blunt and electrosurgical dissection was carried out to the level of the pre pectoral fascia with careful attention paid to hemostasis.  A pocket to house the pulse generator was formed between the pre pectoral fascia and subcutaneous fat with blunt and electrosurgical dissection.  The pocket was copiously irrigated with antibiotic containing normal saline and subsequently observed.  Once adequate hemostasis was confirmed within the pocket, venous access was obtained.  The axillary vein was accessed via a contrast guided Seldinger technique.  J tip 0.035 inch guide wires were introduced and their course through the venous system was confirmed by their presence under fluoroscopy in the inferior vena (excluding inadvertent arterial puncture).    RIGHT VENTRICULAR LEAD:    A peel away sheath was brought to the field and placed into the venous system via over the wire technique.  The right ventricular lead was placed via this sheath into the right ventricle. The lead was attached via active fixation.  Adequate sensing and threshold parameters were obtained.  There was no evidence of diaphragmatic stimulation at 10 V output.  The peel away sheath was removed and the lead collar was advanced to the pectoral muscle and sutured with non absorbable suture.  Tug testing of this lead confirmed stability of the lead and the length of the lead's slack was assessed as optimal with fluoroscopy.     RIGHT ATRIAL LEAD:    A peel away sheath was brought to the field and placed into the venous system via over the wire technique.  The right atrial lead was placed via this sheath into the right atrium. The lead was attached via active fixation.  Adequate sensing and threshold parameters were obtained.  There was no evidence of diaphragmatic stimulation at 10 V output.  The peel away sheath was removed and the lead collar was advanced to the pectoral muscle and sutured  with non absorbable suture.  Tug testing of this lead confirmed stability of the lead and the length of the lead's slack was assessed as optimal with fluoroscopy.     The pulse generator was then sutured to the fascia in a medial location within the pocket using 1-0 silk suture.  The pocket was closed with two layers of suture using 2-0 then 3-0 Vicryl, followed by a layer of surgical adhesive and finally a sterile occlusive dressing.    MEASURED DEVICE DATA:    Atrial lead                   sensin.5 mV in AF                   impedance:            590 Ohms                   Threshold:             AF    RV lead                    sensing:                  NA / DEPENDANT                   impedance:            757 Ohms                   Threshold:             0.6 Volts at 0.4 ms                     PROGRAMMED PARAMETERS:    Mode:                      DDD   Lower Rate:                60 pulses per minute  Upper Sensor Rate:         130 pulses per minute  Upper Tracking Rate:       130 pulses per minute  Mode Switch Rate:          170 bpm    CONCLUSION:  Successful implantation of permanent pacemaker system.      RECOMMENDATION:    Patient is to follow up with our clinic in approximately one week and then myself in 3 months.    No lovenox or heparin at any dose is to be given.      FOR THE PATIENT    Please do not lift more than 10 pounds or abduct the shoulder joint / or raise the arm above the shoulder for 6 weeks after device was implanted (this does not apply to subcutaneous ICDs).    Avoid activities that involve heavy lifting or rough contact that could result in blows to your implant site and to allow your incision time to heal.    No shower or getting device incision wet for 2 days post-operatively.    Keep wound exposed to air unless otherwise instructed, the surgical glue is the bandage.    No creams, lotions, or powders to incision.    Please avoid allowing bra strap or suspenders to lay  over incision until completely healed.    Avoid hot tubs or pools until incision completely healed.    No driving for 24 hours post-operatively after device implant.    Call your doctor if you have any swelling, redness or discharge around your incision, notice anything unusual or unexpected, or you develop a fever that does not go away in two or three days.    Call your doctor if you hear any beeping sounds / vibratory alerts from your device as this indicates your device needs to be checked immediately.    Carry your Medical Device ID Card with you at all times.  Please call our office () with any questions about the device or the wounds.          Leighton Granda, DO, FACC, RS  Cardiac Electrophysiologist  [facility] / [facility]

## 2019-12-08 PROCEDURE — 99232 SBSQ HOSP IP/OBS MODERATE 35: CPT | Performed by: INTERNAL MEDICINE

## 2019-12-08 PROCEDURE — 99024 POSTOP FOLLOW-UP VISIT: CPT | Performed by: NURSE PRACTITIONER

## 2019-12-08 PROCEDURE — 97162 PT EVAL MOD COMPLEX 30 MIN: CPT

## 2019-12-08 PROCEDURE — 97116 GAIT TRAINING THERAPY: CPT

## 2019-12-08 RX ORDER — CARVEDILOL 12.5 MG/1
12.5 TABLET ORAL 2 TIMES DAILY WITH MEALS
Qty: 180 TABLET | Refills: 0 | Status: SHIPPED | OUTPATIENT
Start: 2019-12-08 | End: 2019-12-13 | Stop reason: SDUPTHER

## 2019-12-08 RX ORDER — QUETIAPINE FUMARATE 25 MG/1
25 TABLET, FILM COATED ORAL NIGHTLY
Status: DISCONTINUED | OUTPATIENT
Start: 2019-12-09 | End: 2019-12-09

## 2019-12-08 RX ORDER — CARVEDILOL 6.25 MG/1
6.25 TABLET ORAL ONCE
Status: COMPLETED | OUTPATIENT
Start: 2019-12-08 | End: 2019-12-08

## 2019-12-08 RX ORDER — ROSUVASTATIN CALCIUM 10 MG/1
10 TABLET, COATED ORAL NIGHTLY
Qty: 30 TABLET | Refills: 2 | Status: SHIPPED | OUTPATIENT
Start: 2019-12-08 | End: 2020-01-01

## 2019-12-08 RX ORDER — HEPARIN SODIUM 5000 [USP'U]/ML
5000 INJECTION, SOLUTION INTRAVENOUS; SUBCUTANEOUS EVERY 8 HOURS SCHEDULED
Status: DISCONTINUED | OUTPATIENT
Start: 2019-12-09 | End: 2019-12-09

## 2019-12-08 RX ORDER — CARVEDILOL 12.5 MG/1
12.5 TABLET ORAL 2 TIMES DAILY WITH MEALS
Status: DISCONTINUED | OUTPATIENT
Start: 2019-12-08 | End: 2019-12-11 | Stop reason: HOSPADM

## 2019-12-08 RX ORDER — FAMOTIDINE 20 MG/1
20 TABLET, FILM COATED ORAL
Start: 2019-12-08 | End: 2020-01-01

## 2019-12-08 RX ADMIN — MEMANTINE 10 MG: 10 TABLET ORAL at 20:16

## 2019-12-08 RX ADMIN — SODIUM CHLORIDE, PRESERVATIVE FREE 3 ML: 5 INJECTION INTRAVENOUS at 20:18

## 2019-12-08 RX ADMIN — ROSUVASTATIN CALCIUM 10 MG: 10 TABLET, COATED ORAL at 20:16

## 2019-12-08 RX ADMIN — CARVEDILOL 6.25 MG: 6.25 TABLET, FILM COATED ORAL at 08:23

## 2019-12-08 RX ADMIN — ACETAMINOPHEN 650 MG: 325 TABLET ORAL at 22:24

## 2019-12-08 RX ADMIN — DOCUSATE SODIUM 100 MG: 100 CAPSULE, LIQUID FILLED ORAL at 08:23

## 2019-12-08 RX ADMIN — FAMOTIDINE 20 MG: 20 TABLET ORAL at 17:52

## 2019-12-08 RX ADMIN — SERTRALINE HYDROCHLORIDE 50 MG: 50 TABLET ORAL at 20:16

## 2019-12-08 RX ADMIN — DOCUSATE SODIUM 100 MG: 100 CAPSULE, LIQUID FILLED ORAL at 20:16

## 2019-12-08 RX ADMIN — SODIUM CHLORIDE, PRESERVATIVE FREE 10 ML: 5 INJECTION INTRAVENOUS at 20:16

## 2019-12-08 RX ADMIN — SODIUM CHLORIDE, PRESERVATIVE FREE 10 ML: 5 INJECTION INTRAVENOUS at 08:23

## 2019-12-08 RX ADMIN — SODIUM CHLORIDE, PRESERVATIVE FREE 3 ML: 5 INJECTION INTRAVENOUS at 20:54

## 2019-12-08 RX ADMIN — CARVEDILOL 12.5 MG: 12.5 TABLET, FILM COATED ORAL at 17:52

## 2019-12-08 RX ADMIN — QUETIAPINE FUMARATE 25 MG: 25 TABLET ORAL at 23:57

## 2019-12-08 RX ADMIN — FAMOTIDINE 20 MG: 20 TABLET ORAL at 08:23

## 2019-12-08 RX ADMIN — CARVEDILOL 6.25 MG: 6.25 TABLET, FILM COATED ORAL at 10:12

## 2019-12-08 RX ADMIN — LISINOPRIL 10 MG: 10 TABLET ORAL at 08:23

## 2019-12-08 NOTE — PLAN OF CARE
Problem: Patient Care Overview  Goal: Plan of Care Review  Outcome: Ongoing (interventions implemented as appropriate)  Flowsheets  Taken 12/8/2019 1539 by Carmen Soares RN  Progress: improving  Taken 12/8/2019 1621 by Jena Whitaker, PT  Plan of Care Reviewed With: patient;spouse;daughter  Outcome Summary: PT initial evaluation completed. Pt demonstrates decreased indep/safety re: functional mobility, warranting further skilled PT services to promote PLOF. Limited today by slight agitation and being easily distracted, but likely close to baseline status.  Edu pt/DTR/spouse re: PT recommendation of d/c home with assist and initial 24/7 supervision of family, HHPT, use of RW. Pt's family interested in w/c for community mobility distances; family would also benefit from resources re: eventual transition to assisted living (versus current independent living arrangement).

## 2019-12-08 NOTE — PROGRESS NOTES
INTENSIVIST   PROGRESS NOTE     Hospital:  LOS: 3 days     Mr. Ayan Pichardo, 87 y.o. male is followed for a Chief Complaint of: Bradycardia      Subjective   S     Interval History:  No acute events. Hypertensive this AM.        The patient's relevant past medical, surgical and social history were reviewed and updated in Epic as appropriate.      ROS:   Constitutional: Negative for fever.   Respiratory: Negative for dyspnea.   Cardiovascular: Negative for chest pain.   Gastrointestinal: Negative for  nausea, vomiting and diarrhea.     Objective   O     Vitals:  Temp  Min: 97.5 °F (36.4 °C)  Max: 99.3 °F (37.4 °C)  BP  Min: 110/77  Max: 181/91  Pulse  Min: 68  Max: 111  Resp  Min: 16  Max: 20  SpO2  Min: 93 %  Max: 99 % Flow (L/min)  Min: 2  Max: 2    Intake/Ouptut 24 hrs (7:00AM - 6:59 AM)  Intake & Output (last 3 days)       12/05 0701 - 12/06 0700 12/06 0701 - 12/07 0700 12/07 0701 - 12/08 0700 12/08 0701 - 12/09 0700    P.O.  360      I.V. (mL/kg) 1189.6 (13.7) 741 (8.6)      Total Intake(mL/kg) 1189.6 (13.7) 1101 (12.8)      Urine (mL/kg/hr) 100  325 (0.2)     Total Output 100  325     Net +1089.6 +1101 -325             Urine Unmeasured Occurrence  3 x  1 x    Stool Unmeasured Occurrence  2 x  1 x          Medications (drips):    dextrose 5 % and sodium chloride 0.9 % Last Rate: 100 mL/hr (12/05/19 1949)         Physical Examination  Telemetry:  Paced rhythm.    Constitutional:  No acute distress.   Cardiovascular: Paced rhythm. Regular rhythm. Normal heart sounds.  No murmurs, gallop or rub.   Respiratory: No respiratory distress. Normal respiratory effort.  Normal breath sounds  Clear to ascultation.    Abdominal:  Soft. No masses. Non-tender. No distension. No HSM.   Extremities: No digital cyanosis. No clubbing.  No peripheral edema.   Neurological:   Alert but intermittently confused.               Results from last 7 days   Lab Units 12/07/19  0420 12/06/19  0548 12/05/19  1604   WBC 10*3/mm3 9.54 9.59  7.19   HEMOGLOBIN g/dL 14.3 15.0 14.6   MCV fL 106.9* 104.6* 100.7*   PLATELETS 10*3/mm3 127* 150 154     Results from last 7 days   Lab Units 12/07/19  0420 12/06/19  1201 12/05/19  1604   SODIUM mmol/L 141 145 141   POTASSIUM mmol/L 4.1 3.9 4.7   CO2 mmol/L 16.0* 20.0* 24.0   CREATININE mg/dL 1.34* 1.57* 1.29*   GLUCOSE mg/dL 102* 101* 98   MAGNESIUM mg/dL 2.1 2.0 2.2   PHOSPHORUS mg/dL 2.3* 2.7  --      Estimated Creatinine Clearance: 47.5 mL/min (A) (by C-G formula based on SCr of 1.34 mg/dL (H)).  Results from last 7 days   Lab Units 12/05/19  1604   ALK PHOS U/L 89   BILIRUBIN mg/dL 0.4   ALT (SGPT) U/L 12   AST (SGOT) U/L 23             Images:  Imaging Results (Last 24 Hours)     Procedure Component Value Units Date/Time    XR Chest PA & Lateral [[id number]] Collected:  12/07/19 1029     Updated:  12/07/19 2353    Narrative:          EXAMINATION: XR CHEST PA AND LATERAL - 12/07/2019     INDICATION: Evaluate pacemaker placement.     COMPARISON: 12/05/2019     FINDINGS: Left-sided dual-lead pacemaker has been placed. The heart is  normal in size. There is mild pulmonary vascular congestion and what  appears to be early interstitial edema. No lung consolidation, effusion  or pneumothorax is seen.       Impression:       1. Pacemaker placement with no evidence of pneumothorax.  2. Mild pulmonary vascular congestion.     DICTATED:   12/07/2019  EDITED/ls :   12/07/2019      This report was finalized on 12/7/2019 11:50 PM by DR. Randal Hu MD.               Results: Reviewed.  I reviewed the patient's new laboratory and imaging results.  I independently reviewed the patient's new images.    Medications: Reviewed.    Assessment/Plan   A / P     Mr. Pichardo is an 88yo M with a history of dementia, Afib s/p ablation, CAD and hypertension who presented with bradycardia. He was placed on Isuprel for heart rate support. A permanent pacemaker was placed on 12/6/19. He is hypertensive this AM. Nursing reports difficulty  with ambulation requiring multi-person support.     Nutrition:   Diet Regular  Advance Directives:   Code Status and Medical Interventions:   Ordered at: 12/05/19 1920     Code Status:    CPR     Medical Interventions (Level of Support Prior to Arrest):    Full       Active Hospital Problems    Diagnosis   • **Symptomatic bradycardia (S/P PPM 12/6/19)   • Mobitz type 2 second degree AV block   • Alzheimer's dementia on memantine    • Acute renal insufficiency   • Second degree type II atrioventricular block   • Benign essential hypertension   • Paroxysmal atrial fibrillation (CMS/HCC)   • Atrial fibrillation not on anticoagulation    • CAD    • Hyperlipidemia       Assessment / Plan:    1. Increase Coreg.   2. Continue Lisinopril. May need to increase if renal function remains stable.   3. PT to see today. May need OT consult as well.   4. AM labs  5. Anticipate discharge tomorrow after PT needs evaluated and if BP under better control.       Plan of care and goals reviewed during interdisciplinary rounds.  I discussed the patient's findings and my recommendations with patient, family and nursing staff    Esthela Galarza, DO    Intensive Care Medicine and Pulmonary Medicine

## 2019-12-08 NOTE — PLAN OF CARE
Problem: Patient Care Overview  Goal: Plan of Care Review  12/8/2019 1645 by Carmen Soares, RN  Outcome: Ongoing (interventions implemented as appropriate)  Flowsheets  Taken 12/8/2019 1645 by Carmen Soares, RN  Progress: improving  Outcome Summary: Pt HR remains in the 70, V paced. BP medication was increased to home dose, SBP in the 130s. Pt had difficulty getting  up to chair today, requiring mulitple staff. PT was consulted and pt was able to ambulate in the [place]. Plan is to dishcarge home tomorrow, daughter expresses concern for needing a wheelchair. VSS. Will continue to monitor and plan for discharge.  Taken 12/8/2019 1621 by Jena Whitaker PT  Plan of Care Reviewed With: patient;spouse;daughter  12/8/2019 1614 by Carmen Soares, RN  Outcome: Ongoing (interventions implemented as appropriate)  Flowsheets  Taken 12/8/2019 1539  Progress: improving  Taken 12/8/2019 1400  Plan of Care Reviewed With: patient;spouse;daughter  Note: Pt HR remains in the 70, V paced. BP medication was increased to home dose, SBP in the 130s. Pt had difficulty getting  up to chair today, requiring mulitple staff. PT was consulted and pt was able to ambulate in the [place]. Plan is to dishcarge home tomorrow, daughter expresses concern for needing a wheelchair. VSS. Will continue to monitor and plan for discharge.

## 2019-12-08 NOTE — PLAN OF CARE
Problem: Patient Care Overview  Goal: Plan of Care Review  Outcome: Ongoing (interventions implemented as appropriate)  Flowsheets (Taken 12/8/2019 2948)  Plan of Care Reviewed With: patient; daughter  Note:   Patient is calmer tonight, better BP control, incision looks good, VSS, will continue to monitor

## 2019-12-08 NOTE — THERAPY EVALUATION
Patient Name: Ayan Pichardo  : 1932    MRN: [id number]                              Today's Date: 2019       Admit Date: 2019    Visit Dx:     ICD-10-CM ICD-9-CM   1. Second degree type II atrioventricular block I44.1 426.12   2. General weakness R53.1 780.79   3. Nonspecific chest pain R07.9 786.50   4. History of coronary artery disease Z86.79 V12.59   5. Complete heart block (CMS/HCC) I44.2 426.0     Patient Active Problem List   Diagnosis   • Balance problem   • Memory deficit   • BMI 25.0-25.9,adult   • Nonsmoker   • Recurrent malignant melanoma of skin (CMS/HCC)   • Atrial fibrillation not on anticoagulation    • Essential hypertension   • Benign prostatic hyperplasia   • Brain atrophy (CMS/HCC)   • Coronary artery disease involving native coronary artery of native heart   • Chronic constipation   • Dyspnea   • Encounter for screening colonoscopy   • History of penile implant   • Hyperlipidemia LDL goal <70   • Hypoesthesia of skin   • Idiopathic peripheral neuropathy   • Lumbar radiculopathy   • Malaise and fatigue   • Paroxysmal atrial fibrillation (CMS/HCC)   • Systolic heart failure (CMS/HCC)   • Weakness   • 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 Possibly could be atrophy but cannot completely exclude hydrocephaly.   • Atrial fibrillation (CMS/HCC)    • Back pain    • Brain atrophy (CMS/HCC)    • CAD (coronary artery disease)    • Constipation    • Hyperlipidemia    • Hypersomnia    • Melanoma (CMS/HCC)     SCALP   • Memory deficit    • Neuropathy    • Radiculopathy      Past Surgical History:   Procedure Laterality Date   • AV NODE ABLATION     • CATARACT EXTRACTION     • COLONOSCOPY      <5years, clear   • CORONARY ANGIOPLASTY WITH STENT PLACEMENT       [email or web address]   • PENILE PROSTHESIS IMPLANT     • SCALP/NECK TISSUE EXPANDER INSERTION N/A 9/11/2018    Procedure: Procedure performed:     1) Excision malignant neoplasm of skin of scalp, 6 cm x 5.5 cm, subfascial    2) full-thickness skin graft closure of 6 cm x 5.5 cm    3) left selective neck dissection (level IIA and IIB)    4) right sentinel lymph node biopsy.    5) complex closure skin of right neck, 10 cm ;  Surgeon: Eric Silva MD;  Location: [facility] OR;  Service: ENT     General Information     Row Name 12/08/19 1618          PT Evaluation Time/Intention    Document Type  evaluation  -     Mode of Treatment  physical therapy  -     Row Name 12/08/19 1618          General Information    Patient Profile Reviewed?  yes  -LS     Prior Level of Function  independent:;all household mobility;bathing;dressing  -     Existing Precautions/Restrictions  cardiac;fall;pacemaker  -     Row Name 12/08/19 1618          Relationship/Environment    Lives With  spouse DTRs plan to help transition pt back home  -     Row Name 12/08/19 1618          Resource/Environmental Concerns    Current Living Arrangements  independent/assisted living facility  -     Row Name 12/08/19 1618          Home Main Entrance    Number of Stairs, Main Entrance  none  -LS     Row Name 12/08/19 1618          Stairs Within Home, Primary    Number of Stairs, Within Home, Primary  none  -LS     Row Name 12/08/19 1618          Cognitive Assessment/Intervention- PT/OT    Orientation Status (Cognition)  oriented to;person  -LS     Cognitive Assessment/Intervention Comment  baseline dementia; increased agitation with questioning  -     Row Name 12/08/19 1618          Safety Issues, Functional Mobility    Impairments Affecting Function (Mobility)  balance;cognition;coordination;endurance/activity tolerance;strength  -       User Key  (r) = Recorded By, (t) = Taken By, (c) = Cosigned By    Initials Name Provider Type    LS Jena Whitaker, PT  Physical Therapist        Mobility     Row Name 12/08/19 1619          Bed Mobility Assessment/Treatment    Comment (Bed Mobility)  UIC  -LS     Row Name 12/08/19 1619          Transfer Assessment/Treatment    Comment (Transfers)  VC for hand placement; slight post lean upon standing (self corrected).  -LS     Row Name 12/08/19 1619          Sit-Stand Transfer    Sit-Stand [place] (Transfers)  contact guard;verbal cues  -LS     Assistive Device (Sit-Stand Transfers)  walker, front-wheeled  -LS     Row Name 12/08/19 1619          Gait/Stairs Assessment/Training    Gait/Stairs Assessment/Training  gait/ambulation independence  -LS     [place] Level (Gait)  minimum assist (75% patient effort);verbal cues;1 person to manage equipment  -LS     Assistive Device (Gait)  walker, front-wheeled  -LS     Distance in Feet (Gait)  230  -LS     Deviations/Abnormal Patterns (Gait)  bilateral deviations;stride length decreased;alonzo decreased  -LS     Bilateral Gait Deviations  forward flexed posture;heel strike decreased  -LS     Comment (Gait/Stairs)  VC for maintaining  on RW; easily distracted and agitated. No noted LOB; keeps RW too far anteriorly, despite cues.   -LS       User Key  (r) = Recorded By, (t) = Taken By, (c) = Cosigned By    Initials Name Provider Type    LS Jena Whitaker, PT Physical Therapist        Obj/Interventions     Row Name 12/08/19 1621          General ROM    GENERAL ROM COMMENTS  BLE WFL  -LS     Row Name 12/08/19 1621          MMT (Manual Muscle Testing)    General MMT Comments  BLE 4/5  -LS     Row Name 12/08/19 1621          Static Sitting Balance    Level of [place] (Unsupported Sitting, Static Balance)  supervision  -     Sitting Position (Unsupported Sitting, Static Balance)  sitting in chair  -LS     Row Name 12/08/19 1621          Static Standing Balance    Level of [place] (Supported Standing, Static Balance)  contact guard assist  -LS     Assistive Device Utilized  (Supported Standing, Static Balance)  walker, rolling  -LS     Row Name 12/08/19 1621          Sensory Assessment/Intervention    Sensory General Assessment  no sensation deficits identified BLE WFL  -LS       User Key  (r) = Recorded By, (t) = Taken By, (c) = Cosigned By    Initials Name Provider Type    LS Jena Whitaker, PT Physical Therapist        Goals/Plan     Row Name 12/08/19 1627          Bed Mobility Goal 1 (PT)    Activity/Assistive Device (Bed Mobility Goal 1, PT)  sit to supine/supine to sit  -LS     [place] Level/Cues Needed (Bed Mobility Goal 1, PT)  independent  -LS     Time Frame (Bed Mobility Goal 1, PT)  2 weeks  -LS     Progress/Outcomes (Bed Mobility Goal 1, PT)  goal ongoing  -LS     Row Name 12/08/19 1627          Transfer Goal 1 (PT)    Activity/Assistive Device (Transfer Goal 1, PT)  sit-to-stand/stand-to-sit;walker, rolling  -LS     [place] Level/Cues Needed (Transfer Goal 1, PT)  supervision required  -LS     Time Frame (Transfer Goal 1, PT)  2 weeks  -LS     Progress/Outcome (Transfer Goal 1, PT)  goal ongoing  -LS     Row Name 12/08/19 1627          Gait Training Goal 1 (PT)    Activity/Assistive Device (Gait Training Goal 1, PT)  gait (walking locomotion);walker, rolling  -LS     [place] Level (Gait Training Goal 1, PT)  supervision required  -LS     Distance (Gait Goal 1, PT)  300  -LS     Time Frame (Gait Training Goal 1, PT)  2 weeks  -LS     Progress/Outcome (Gait Training Goal 1, PT)  goal ongoing  -       User Key  (r) = Recorded By, (t) = Taken By, (c) = Cosigned By    Initials Name Provider Type    LS Jena Whitaker, PT Physical Therapist        Clinical Impression     Row Name 12/08/19 1621          Pain Assessment    Additional Documentation  Pain Scale: Numbers Pre/Post-Treatment (Group)  -LS     Row Name 12/08/19 1621          Pain Scale: Numbers Pre/Post-Treatment    Pain Scale: Numbers, Pretreatment  0/10 - no pain  -LS     Pain Scale: Numbers,  Post-Treatment  0/10 - no pain  -     Row Name 12/08/19 1621          Plan of Care Review    Plan of Care Reviewed With  patient;spouse;daughter  -     Outcome Summary  PT initial evaluation completed. Pt demonstrates decreased indep/safety re: functional mobility, warranting further skilled PT services to promote PLOF. Limited today by slight agitation and being easily distracted, but likely close to baseline status.  Edu pt/DTR/spouse re: PT recommendation of d/c home with assist and initial 24/7 supervision of family, HHPT, use of RW. Pt's family interested in w/c for community mobility distances; family would also benefit from resources re: eventual transition to assisted living (versus current independent living arrangement).   -     Row Name 12/08/19 1621          Physical Therapy Clinical Impression    Patient/Family Goals Statement (PT Clinical Impression)  return to PLOF; go home to pt's familiar environment  -     Criteria for Skilled Interventions Met (PT Clinical Impression)  yes;treatment indicated  -     Rehab Potential (PT Clinical Summary)  good, to achieve stated therapy goals  -     Row Name 12/08/19 1621          Vital Signs    Pre Systolic BP Rehab  131  -LS     Pre Treatment Diastolic BP  84  -LS     Pretreatment Heart Rate (beats/min)  70  -LS     Posttreatment Heart Rate (beats/min)  -- stable throughout; RN present  -LS     O2 Delivery Pre Treatment  room air  -LS     O2 Delivery Intra Treatment  room air  -LS     O2 Delivery Post Treatment  room air  -LS     Pre Patient Position  Sitting  -LS     Intra Patient Position  Standing  -LS     Post Patient Position  Sitting  -LS     Row Name 12/08/19 1621          Positioning and Restraints    Pre-Treatment Position  sitting in chair/recliner  -LS     Post Treatment Position  chair  -LS     In Chair  notified nsg;reclined;call light within reach;encouraged to call for assist;with family/caregiver;exit alarm on;waffle cushion;legs  elevated  -LS       User Key  (r) = Recorded By, (t) = Taken By, (c) = Cosigned By    Initials Name Provider Type    Jena Laws, PT Physical Therapist        Outcome Measures     Row Name 12/08/19 1628          How much help from another person do you currently need...    Turning from your back to your side while in flat bed without using bedrails?  3  -LS     Moving from lying on back to sitting on the side of a flat bed without bedrails?  3  -LS     Moving to and from a bed to a chair (including a wheelchair)?  3  -LS     Standing up from a chair using your arms (e.g., wheelchair, bedside chair)?  3  -LS     Climbing 3-5 steps with a railing?  2  -LS     To walk in hospital room?  3  -LS     AM-PAC 6 Clicks Score (PT)  17  -LS     Row Name 12/08/19 1628          Functional Assessment    Outcome Measure Options  AM-PAC 6 Clicks Basic Mobility (PT)  -LS       User Key  (r) = Recorded By, (t) = Taken By, (c) = Cosigned By    Initials Name Provider Type    Jena Laws, PT Physical Therapist          PT Recommendation and Plan  Planned Therapy Interventions (PT Eval): balance training, bed mobility training, gait training, home exercise program, patient/family education, strengthening, transfer training  Outcome Summary/Treatment Plan (PT)  Anticipated Discharge Disposition (PT): home with assist, home with home health, other (see comments)(return to indep living facility with assist of spouse/ DTRs)  Plan of Care Reviewed With: patient, spouse, daughter  Outcome Summary: PT initial evaluation completed. Pt demonstrates decreased indep/safety re: functional mobility, warranting further skilled PT services to promote PLOF. Limited today by slight agitation and being easily distracted, but likely close to baseline status.  Edu pt/DTR/spouse re: PT recommendation of d/c home with assist and initial 24/7 supervision of family, HHPT, use of RW. Pt's family interested in w/c for community mobility distances;  family would also benefit from resources re: eventual transition to assisted living (versus current independent living arrangement).      Time Calculation:   PT Charges     Row Name 12/08/19 1630             Time Calculation    Start Time  1420  -      PT Received On  12/08/19  -      PT Goal Re-Cert Due Date  12/18/19  -         Time Calculation- PT    Total Timed Code Minutes- PT  12 minute(s)  -         Timed Charges    17379 - Gait Training Minutes   12  -LS        User Key  (r) = Recorded By, (t) = Taken By, (c) = Cosigned By    Initials Name Provider Type     Jena Whitaker, PT Physical Therapist        Therapy Charges for Today     Code Description Service Date Service Provider Modifiers Qty    46075322394 HC PT EVAL MOD COMPLEXITY 3 12/8/2019 Jena Whitaker, PT GP 1    28160345604 HC GAIT TRAINING EA 15 MIN 12/8/2019 Jena Whitaker, PT GP 1          PT G-Codes  Outcome Measure Options: AM-PAC 6 Clicks Basic Mobility (PT)  AM-PAC 6 Clicks Score (PT): 17    Jena Whitaker, PT  12/8/2019

## 2019-12-08 NOTE — PROGRESS NOTES
[facility] at [facility]  Cardiology Progress Note      Chief Complaint/Reason for service:    · Second-degree heart block status post permanent pacemaker placement         Patient was placed on Coreg and lisinopril yesterday.  Pressures have remained stable.  Chest x-ray yesterday showed no evidence of pneumothorax.  Patient is currently sitting up in the chair without complaints.  According to the family he is not been allowed to ambulate.  According to the nurse the patient is very unsteady on his feet and it takes multiple people to ambulate him to the bedside toilet.  Physical therapy is due to evaluate later today.  Remains in a paced rhythm.    Past medical, surgical, social and family history reviewed in the patient's electronic medical record.               Vital Sign Min/Max for last 24 hours  Temp  Min: 97.5 °F (36.4 °C)  Max: 99.3 °F (37.4 °C)   BP  Min: 110/77  Max: 181/91   Pulse  Min: 68  Max: 111   Resp  Min: 16  Max: 20   SpO2  Min: 93 %  Max: 99 %   Flow (L/min)  Min: 2  Max: 2      Intake/Output Summary (Last 24 hours) at 12/8/2019 1021  Last data filed at 12/8/2019 0200  Gross per 24 hour   Intake --   Output 325 ml   Net -325 ml           Physical Exam   Constitutional: He is oriented to person, place, and time. He appears well-developed and well-nourished.   HENT:   Head: Normocephalic.   Neck: No JVD present. Carotid bruit is not present.   Cardiovascular: Normal rate, regular rhythm and normal heart sounds. Exam reveals no gallop and no friction rub.   No murmur heard.   Paced   Pulmonary/Chest: Effort normal and breath sounds normal.   Abdominal: Soft. Bowel sounds are normal. He exhibits no distension.   Neurological: He is alert and oriented to person, place, and time.   Skin: Skin is warm and dry.   Psychiatric: He has a normal mood and affect.       Tele: Paced    Results Review (reviewed the patient's recent labs in the electronic medical record):     EKG: Atrial  fibrillation 12/7/2019    CXR: 12/7/2019  EXAMINATION: XR CHEST PA AND LATERAL - 12/07/2019     INDICATION: Evaluate pacemaker placement.     COMPARISON: 12/05/2019     FINDINGS: Left-sided dual-lead pacemaker has been placed. The heart is  normal in size. There is mild pulmonary vascular congestion and what  appears to be early interstitial edema. No lung consolidation, effusion  or pneumothorax is seen.     IMPRESSION:  1. Pacemaker placement with no evidence of pneumothorax.  2. Mild pulmonary vascular congestion.     DICTATED:   12/07/2019  EDITED/ls :   12/07/2019       Results from last 7 days   Lab Units 12/07/19  0420 12/06/19  1201 12/05/19  1604   SODIUM mmol/L 141 145 141   POTASSIUM mmol/L 4.1 3.9 4.7   CHLORIDE mmol/L 112* 110* 105   BUN mg/dL 29* 31* 28*   CREATININE mg/dL 1.34* 1.57* 1.29*   MAGNESIUM mg/dL 2.1 2.0 2.2     Results from last 7 days   Lab Units 12/06/19  1201 12/05/19  1604   TROPONIN T ng/mL 0.072* 0.028     Results from last 7 days   Lab Units 12/07/19  0420 12/06/19  0548 12/05/19  1604   WBC 10*3/mm3 9.54 9.59 7.19   HEMOGLOBIN g/dL 14.3 15.0 14.6   HEMATOCRIT % 46.4 47.6 44.2   PLATELETS 10*3/mm3 127* 150 154       No results found for: HGBA1C    Lab Results   Component Value Date    CHLPL 151 (L) 05/15/2019    TRIG 111 05/15/2019    HDL 30 (L) 05/15/2019    LDL 99 05/15/2019            Active Hospital Problems    Diagnosis POA   • **Symptomatic bradycardia (S/P PPM 12/6/19) [R00.1] Yes     Priority: High   • Mobitz type 2 second degree AV block [I44.1] Yes     Priority: High     · Echo (12/7/2019):LVEF = 55%.  · Dual-chamber permanent pacemaker placed 12/6/2019     • Acute renal insufficiency [N28.9] Yes     Priority: High   • Essential hypertension [I10] Yes     Priority: Medium   • Paroxysmal atrial fibrillation (CMS/HCC) [I48.0] Yes     Priority: Medium   • Atrial fibrillation not on anticoagulation  [I48.91] Yes     Priority: Medium   • Coronary artery disease involving native  coronary artery of native heart [I25.10] Yes     Priority: Medium     · Cardiac cath (2011): JESSICA to LAD and RCA     • Hyperlipidemia LDL goal <70 [E78.5] Yes     Priority: Medium   • Alzheimer's dementia on memantine  [G30.9, F02.80] Yes     Priority: Low              · From a cardiovascular standpoint patient is okay for [facility]  · Home on Coreg, and Crestor  · Defer anticoagulation due to high risk of falls and dementia  · Follow-up in 7 days for wound check and follow-up with Dr. Granda in 3 months  · Please call with any further questions    Dodie Trujillo, APRN  12/8/2019

## 2019-12-09 PROBLEM — Z95.0 PRESENCE OF PERMANENT CARDIAC PACEMAKER: Status: ACTIVE | Noted: 2019-12-09

## 2019-12-09 PROBLEM — I44.2 COMPLETE AV BLOCK (HCC): Status: ACTIVE | Noted: 2019-12-09

## 2019-12-09 LAB
ALBUMIN SERPL-MCNC: 3.5 G/DL (ref 3.5–5.2)
ANION GAP SERPL CALCULATED.3IONS-SCNC: 14 MMOL/L (ref 5–15)
BUN BLD-MCNC: 40 MG/DL (ref 8–23)
BUN/CREAT SERPL: 35.4 (ref 7–25)
CALCIUM SPEC-SCNC: 9.7 MG/DL (ref 8.6–10.5)
CHLORIDE SERPL-SCNC: 109 MMOL/L (ref 98–107)
CO2 SERPL-SCNC: 20 MMOL/L (ref 22–29)
CREAT BLD-MCNC: 1.13 MG/DL (ref 0.76–1.27)
GFR SERPL CREATININE-BSD FRML MDRD: 61 ML/MIN/1.73
GLUCOSE BLD-MCNC: 102 MG/DL (ref 65–99)
PHOSPHATE SERPL-MCNC: 2.2 MG/DL (ref 2.5–4.5)
PHOSPHATE SERPL-MCNC: 3.6 MG/DL (ref 2.5–4.5)
POTASSIUM BLD-SCNC: 3.2 MMOL/L (ref 3.5–5.2)
POTASSIUM BLD-SCNC: 4.5 MMOL/L (ref 3.5–5.2)
SODIUM BLD-SCNC: 143 MMOL/L (ref 136–145)

## 2019-12-09 PROCEDURE — 84100 ASSAY OF PHOSPHORUS: CPT | Performed by: INTERNAL MEDICINE

## 2019-12-09 PROCEDURE — 25010000002 DIPHENHYDRAMINE PER 50 MG: Performed by: NURSE PRACTITIONER

## 2019-12-09 PROCEDURE — 97530 THERAPEUTIC ACTIVITIES: CPT

## 2019-12-09 PROCEDURE — 80069 RENAL FUNCTION PANEL: CPT | Performed by: INTERNAL MEDICINE

## 2019-12-09 PROCEDURE — 99291 CRITICAL CARE FIRST HOUR: CPT | Performed by: INTERNAL MEDICINE

## 2019-12-09 PROCEDURE — 25010000002 ENOXAPARIN PER 10 MG: Performed by: INTERNAL MEDICINE

## 2019-12-09 PROCEDURE — 25010000002 HEPARIN (PORCINE) PER 1000 UNITS: Performed by: INTERNAL MEDICINE

## 2019-12-09 PROCEDURE — 84132 ASSAY OF SERUM POTASSIUM: CPT | Performed by: INTERNAL MEDICINE

## 2019-12-09 PROCEDURE — 25010000002 HYDRALAZINE PER 20 MG: Performed by: INTERNAL MEDICINE

## 2019-12-09 RX ORDER — POTASSIUM CHLORIDE 750 MG/1
40 CAPSULE, EXTENDED RELEASE ORAL AS NEEDED
Status: DISCONTINUED | OUTPATIENT
Start: 2019-12-09 | End: 2019-12-11 | Stop reason: HOSPADM

## 2019-12-09 RX ORDER — POTASSIUM CHLORIDE 7.45 MG/ML
10 INJECTION INTRAVENOUS
Status: DISCONTINUED | OUTPATIENT
Start: 2019-12-09 | End: 2019-12-11 | Stop reason: HOSPADM

## 2019-12-09 RX ORDER — DIPHENHYDRAMINE HYDROCHLORIDE 50 MG/ML
12.5 INJECTION INTRAMUSCULAR; INTRAVENOUS ONCE
Status: COMPLETED | OUTPATIENT
Start: 2019-12-09 | End: 2019-12-09

## 2019-12-09 RX ORDER — POTASSIUM CHLORIDE 1.5 G/1.77G
40 POWDER, FOR SOLUTION ORAL AS NEEDED
Status: DISCONTINUED | OUTPATIENT
Start: 2019-12-09 | End: 2019-12-11 | Stop reason: HOSPADM

## 2019-12-09 RX ORDER — QUETIAPINE FUMARATE 25 MG/1
12.5 TABLET, FILM COATED ORAL NIGHTLY
Status: DISCONTINUED | OUTPATIENT
Start: 2019-12-09 | End: 2019-12-11 | Stop reason: HOSPADM

## 2019-12-09 RX ORDER — QUETIAPINE FUMARATE 25 MG/1
12.5 TABLET, FILM COATED ORAL EVERY 12 HOURS PRN
Status: DISCONTINUED | OUTPATIENT
Start: 2019-12-09 | End: 2019-12-11 | Stop reason: HOSPADM

## 2019-12-09 RX ADMIN — POTASSIUM & SODIUM PHOSPHATES POWDER PACK 280-160-250 MG 2 PACKET: 280-160-250 PACK at 17:37

## 2019-12-09 RX ADMIN — POTASSIUM CHLORIDE 40 MEQ: 1.5 POWDER, FOR SOLUTION ORAL at 09:02

## 2019-12-09 RX ADMIN — ENOXAPARIN SODIUM 40 MG: 40 INJECTION SUBCUTANEOUS at 20:18

## 2019-12-09 RX ADMIN — CARVEDILOL 12.5 MG: 12.5 TABLET, FILM COATED ORAL at 08:02

## 2019-12-09 RX ADMIN — LISINOPRIL 10 MG: 10 TABLET ORAL at 08:02

## 2019-12-09 RX ADMIN — DOCUSATE SODIUM 100 MG: 100 CAPSULE, LIQUID FILLED ORAL at 20:18

## 2019-12-09 RX ADMIN — POTASSIUM & SODIUM PHOSPHATES POWDER PACK 280-160-250 MG 2 PACKET: 280-160-250 PACK at 09:02

## 2019-12-09 RX ADMIN — MEMANTINE 10 MG: 10 TABLET ORAL at 20:18

## 2019-12-09 RX ADMIN — SODIUM CHLORIDE, PRESERVATIVE FREE 10 ML: 5 INJECTION INTRAVENOUS at 08:03

## 2019-12-09 RX ADMIN — HEPARIN SODIUM 5000 UNITS: 5000 INJECTION INTRAVENOUS; SUBCUTANEOUS at 05:35

## 2019-12-09 RX ADMIN — DOCUSATE SODIUM 100 MG: 100 CAPSULE, LIQUID FILLED ORAL at 08:02

## 2019-12-09 RX ADMIN — SERTRALINE HYDROCHLORIDE 50 MG: 50 TABLET ORAL at 20:18

## 2019-12-09 RX ADMIN — FAMOTIDINE 20 MG: 20 TABLET ORAL at 17:37

## 2019-12-09 RX ADMIN — FAMOTIDINE 20 MG: 20 TABLET ORAL at 06:46

## 2019-12-09 RX ADMIN — POTASSIUM CHLORIDE 40 MEQ: 1.5 POWDER, FOR SOLUTION ORAL at 17:37

## 2019-12-09 RX ADMIN — DIPHENHYDRAMINE HYDROCHLORIDE 12.5 MG: 50 INJECTION INTRAMUSCULAR; INTRAVENOUS at 04:44

## 2019-12-09 RX ADMIN — ROSUVASTATIN CALCIUM 10 MG: 10 TABLET, COATED ORAL at 20:18

## 2019-12-09 RX ADMIN — CARVEDILOL 12.5 MG: 12.5 TABLET, FILM COATED ORAL at 17:37

## 2019-12-09 RX ADMIN — HYDRALAZINE HYDROCHLORIDE 20 MG: 20 INJECTION INTRAMUSCULAR; INTRAVENOUS at 00:16

## 2019-12-09 RX ADMIN — CETIRIZINE HYDROCHLORIDE 10 MG: 10 TABLET, FILM COATED ORAL at 08:02

## 2019-12-09 RX ADMIN — HYDRALAZINE HYDROCHLORIDE 20 MG: 20 INJECTION INTRAMUSCULAR; INTRAVENOUS at 13:18

## 2019-12-09 NOTE — CONSULTS
Carlyle Coker MD  Consulting physician: Shivam    Chief Complaint   Patient presents with   • Slow Heart Rate         HPI: Patient is an 87-year-old male brought in hospital due to bradycardia.  Patient has had a permanent pacemaker placed but has had a howard course during this hospitalization.  Family reports that he is been somewhat declining and nursing staff also report he has been having decreasing appetite as well as decreasing functional abilities particularly over the last 24 hours.      Past Medical History:   Diagnosis Date   • Abnormal brain CT 6/2/2016    Overview:  Dilated lateral and 3rd ventricle. Possibly could be atrophy but cannot completely exclude hydrocephaly.   • Atrial fibrillation (CMS/HCC)    • Back pain    • Brain atrophy (CMS/HCC)    • CAD (coronary artery disease)    • Constipation    • Hyperlipidemia    • Hypersomnia    • Melanoma (CMS/HCC)     SCALP   • Memory deficit    • Neuropathy    • Radiculopathy      Past Surgical History:   Procedure Laterality Date   • AV NODE ABLATION     • CARDIAC ELECTROPHYSIOLOGY PROCEDURE N/A 12/6/2019    Procedure: DEVICE IMPLANT;  Surgeon: Leighton Granda DO;  Location: [facility] INVASIVE LOCATION;  Service: Cardiology   • CATARACT EXTRACTION     • COLONOSCOPY      <5years, clear   • CORONARY ANGIOPLASTY WITH STENT PLACEMENT      [place]   • PENILE PROSTHESIS IMPLANT     • SCALP/NECK TISSUE EXPANDER INSERTION N/A 9/11/2018    Procedure: Procedure performed:     1) Excision malignant neoplasm of skin of scalp, 6 cm x 5.5 cm, subfascial    2) full-thickness skin graft closure of 6 cm x 5.5 cm    3) left selective neck dissection (level IIA and IIB)    4) right sentinel lymph node biopsy.    5) complex closure skin of right neck, 10 cm ;  Surgeon: Eric Silva MD;  Location: [facility] OR;  Service: ENT     Current Code Status     Date Active Code Status Order ID Comments User Context       12/9/2019 1041 No CPR [id number]  Eric Langley MD  Inpatient       Questions for Current Code Status     Question Answer Comment    Code Status No CPR     Medical Interventions (Level of Support Prior to Arrest) Limited     Limited Support to NOT Include Artificial Nutrition      Blood Products      Cardioversion/Defibrillation      Dialysis      Intubation      NIPPV (Non-Invasive Positive Pressure Support)      Vasopressors      Antiarrhythmic Drugs     Level Of Support Discussed With Next of Kin (If No Surrogate)         Current Facility-Administered Medications   Medication Dose Route Frequency Provider Last Rate Last Dose   • acetaminophen (TYLENOL) tablet 650 mg  650 mg Oral Q4H PRN Maribel Meier MD   650 mg at 12/08/19 2224    Or   • acetaminophen (TYLENOL) suppository 650 mg  650 mg Rectal Q4H PRN Maribel Meier MD       • bisacodyl (DULCOLAX) EC tablet 5 mg  5 mg Oral Daily PRN Maribel Meier MD       • carvedilol (COREG) tablet 12.5 mg  12.5 mg Oral BID With Meals Case, Esthela V., DO   12.5 mg at 12/09/19 0802   • cetirizine (zyrTEC) tablet 10 mg  10 mg Oral Daily Case Esthela V., DO   10 mg at 12/09/19 0802   • dextrose 5 % and sodium chloride 0.9 % infusion  100 mL/hr Intravenous Continuous Maribel Meier  mL/hr at 12/05/19 1949 100 mL/hr at 12/05/19 1949   • docusate sodium (COLACE) capsule 100 mg  100 mg Oral BID Maribel Meier MD   100 mg at 12/09/19 0802   • enoxaparin (LOVENOX) syringe 40 mg  40 mg Subcutaneous Nightly Eric Langley MD       • famotidine (PEPCID) tablet 20 mg  20 mg Oral BID AC Maribel Meier MD   20 mg at 12/09/19 0646   • hydrALAZINE (APRESOLINE) injection 20 mg  20 mg Intravenous Q4H PRN Maribel Meier MD   20 mg at 12/09/19 1318   • lisinopril (PRINIVIL,ZESTRIL) tablet 10 mg  10 mg Oral Q24H Cate Soto APRN   10 mg at 12/09/19 0802   • memantine (NAMENDA) tablet 10 mg  10 mg Oral Nightly Maribel Meier MD   10 mg at 12/08/19 2016   •  nitroglycerin (NITROSTAT) SL tablet 0.4 mg  0.4 mg Sublingual Q5 Min PRN Maribel Meier MD       • ondansetron (ZOFRAN) tablet 4 mg  4 mg Oral Q6H PRN Maribel Meier MD        Or   • ondansetron (ZOFRAN) injection 4 mg  4 mg Intravenous Q6H PRN Maribel Meier MD       • potassium & sodium phosphates (PHOS-NAK) 280-160-250 MG packet - for Phosphorus less than 1.25 mg/dL  2 packet Oral Q6H PRN Eric Langley MD        Or   • potassium & sodium phosphates (PHOS-NAK) 280-160-250 MG packet - for Phosphorus 1.25 - 2.5 mg/dL  2 packet Oral Q6H PRN Eric Langley MD   2 packet at 12/09/19 0902   • potassium chloride (MICRO-K) CR capsule 40 mEq  40 mEq Oral PRN Eric Langley MD        Or   • potassium chloride (KLOR-CON) packet 40 mEq  40 mEq Oral PRN Eric Langley MD   40 mEq at 12/09/19 0902    Or   • potassium chloride 10 mEq in 100 mL IVPB  10 mEq Intravenous Q1H PRN Eric Langley MD       • QUEtiapine (SEROquel) tablet 12.5 mg  12.5 mg Oral Nightly Eric Langley MD       • QUEtiapine (SEROquel) tablet 12.5 mg  12.5 mg Oral Q12H PRN Eric Langley MD       • rosuvastatin (CRESTOR) tablet 10 mg  10 mg Oral Nightly Maribel Meier MD   10 mg at 12/08/19 2016   • sertraline (ZOLOFT) tablet 50 mg  50 mg Oral Nightly Maribel Meier MD   50 mg at 12/08/19 2016   • sodium chloride 0.9 % flush 10 mL  10 mL Intravenous Q12H Maribel Meier MD   10 mL at 12/09/19 0803   • sodium chloride 0.9 % flush 10 mL  10 mL Intravenous PRN Leighton Granda, DO       • sodium chloride 0.9 % flush 3 mL  3 mL Intravenous Q12H Curt Pate APRN   3 mL at 12/08/19 2054   • sodium chloride 0.9 % flush 3 mL  3 mL Intravenous Q12H Leighton Granda DO   3 mL at 12/08/19 2018       dextrose 5 % and sodium chloride 0.9 % 100 mL/hr Last Rate: 100 mL/hr (12/05/19 1949)     •  acetaminophen **OR** acetaminophen  •  bisacodyl  •  hydrALAZINE  •  nitroglycerin  •  ondansetron  "**OR** ondansetron  •  potassium & sodium phosphates **OR** potassium & sodium phosphates  •  potassium chloride **OR** potassium chloride **OR** potassium chloride  •  QUEtiapine  •  sodium chloride  No Known Allergies  Family History   Problem Relation Age of Onset   • Cancer Mother    • Hypertension Mother      Social History     Socioeconomic History   • Marital status:      Spouse name: Not on file   • Number of children: Not on file   • Years of education: Not on file   • Highest education level: Not on file   Tobacco Use   • Smoking status: Never Smoker   • Smokeless tobacco: Never Used   Substance and Sexual Activity   • Alcohol use: No   • Drug use: No   • Sexual activity: Defer     Review of Systems -all others reviewed and found negative that mentioned in the HPI      /100   Pulse 78   Temp 98.2 °F (36.8 °C) (Axillary)   Resp 20   Ht 188 cm (74.02\")   Wt 86.4 kg (190 lb 7.6 oz)   SpO2 91%   BMI 24.45 kg/m²     Intake/Output Summary (Last 24 hours) at 12/9/2019 1511  Last data filed at 12/9/2019 0800  Gross per 24 hour   Intake 420 ml   Output 280 ml   Net 140 ml     Physical Exam:      General Appearance:    Alert, cooperative, in no acute distress   Head:    Normocephalic, without obvious abnormality, atraumatic   Eyes:            Lids and lashes normal, conjunctivae and sclerae normal, no   icterus, no pallor, corneas clear, PERRLA   Ears:    Ears appear intact with no abnormalities noted   Throat:   No oral lesions, no thrush, oral mucosa moist   Neck:   No adenopathy, supple, trachea midline, no thyromegaly, no   carotid bruit, no JVD   Back:     No kyphosis present, no scoliosis present, no skin lesions,      erythema or scars, no tenderness to percussion or                   palpation,   range of motion normal   Lungs:     Clear to auscultation,respirations regular, even and                  unlabored    Heart:    Regular rhythm and normal rate, normal S1 and S2, no            " murmur, no gallop, no rub, no click   Chest Wall:    No abnormalities observed   Abdomen:     Normal bowel sounds, no masses, no organomegaly, soft        non-tender, non-distended, no guarding, no rebound                tenderness   Rectal:     Deferred   Extremities:   Moves all extremities well, no edema, no cyanosis, no             redness   Pulses:   Pulses palpable and equal bilaterally   Skin:   No bleeding, bruising or rash   Lymph nodes:   No palpable adenopathy   Neurologic:   Cranial nerves 2 - 12 grossly intact, sensation intact, DTR       present and equal bilaterally       Results from last 7 days   Lab Units 12/07/19  0420   WBC 10*3/mm3 9.54   HEMOGLOBIN g/dL 14.3   HEMATOCRIT % 46.4   PLATELETS 10*3/mm3 127*     Results from last 7 days   Lab Units 12/09/19  0509  12/05/19  1604   SODIUM mmol/L 143   < > 141   POTASSIUM mmol/L 3.2*   < > 4.7   CHLORIDE mmol/L 109*   < > 105   CO2 mmol/L 20.0*   < > 24.0   BUN mg/dL 40*   < > 28*   CREATININE mg/dL 1.13   < > 1.29*   CALCIUM mg/dL 9.7   < > 10.0   BILIRUBIN mg/dL  --   --  0.4   ALK PHOS U/L  --   --  89   ALT (SGPT) U/L  --   --  12   AST (SGOT) U/L  --   --  23   GLUCOSE mg/dL 102*   < > 98    < > = values in this interval not displayed.     Results from last 7 days   Lab Units 12/09/19  0509   SODIUM mmol/L 143   POTASSIUM mmol/L 3.2*   CHLORIDE mmol/L 109*   CO2 mmol/L 20.0*   BUN mg/dL 40*   CREATININE mg/dL 1.13   GLUCOSE mg/dL 102*   CALCIUM mg/dL 9.7     Imaging Results (Last 72 Hours)     Procedure Component Value Units Date/Time    XR Chest PA & Lateral [[id number]] Collected:  12/07/19 1029     Updated:  12/07/19 2353    Narrative:          EXAMINATION: XR CHEST PA AND LATERAL - 12/07/2019     INDICATION: Evaluate pacemaker placement.     COMPARISON: 12/05/2019     FINDINGS: Left-sided dual-lead pacemaker has been placed. The heart is  normal in size. There is mild pulmonary vascular congestion and what  appears to be early interstitial  edema. No lung consolidation, effusion  or pneumothorax is seen.       Impression:       1. Pacemaker placement with no evidence of pneumothorax.  2. Mild pulmonary vascular congestion.     DICTATED:   12/07/2019  EDITED/ls :   12/07/2019      This report was finalized on 12/7/2019 11:50 PM by DR. Randal Hu MD.           Impression: Bradycardia  Anorexia  Dementia  [facility]      Plan: Had a discussion with the patient's daughter at bedside.  Overall the plan is the outpatient dose over next 24 to 48 hours.  They are looking at trying go to rehab facility at discharge, however it is somewhat concerning that the patient is declining having decreasing appetite.  It patient's appetite improves over the next 24 hours and will continue with looking at rehab.  If he starts declining we may need to talk about hospice in some capacity.        Vincent Roberts,   12/09/19  3:11 PM

## 2019-12-09 NOTE — PLAN OF CARE
Problem: Palliative Care (Adult)  Intervention: Support/Optimize Psychosocial Response  Flowsheets (Taken 12/9/2019 3010)  Family/Support System Care: caregiver stress acknowledged; presence promoted; involvement promoted; self-care encouraged; support provided

## 2019-12-09 NOTE — THERAPY TREATMENT NOTE
Patient Name: Ayan Pichardo  : 1932    MRN: [id number]                              Today's Date: 2019       Admit Date: 2019    Visit Dx:     ICD-10-CM ICD-9-CM   1. Second degree type II atrioventricular block I44.1 426.12   2. General weakness R53.1 780.79   3. Nonspecific chest pain R07.9 786.50   4. History of coronary artery disease Z86.79 V12.59   5. Complete heart block (CMS/HCC) I44.2 426.0     Patient Active Problem List   Diagnosis   • Balance problem   • Memory deficit   • BMI 25.0-25.9,adult   • Nonsmoker   • Recurrent malignant melanoma of skin (CMS/HCC)   • Atrial fibrillation not on anticoagulation    • Essential hypertension   • Benign prostatic hyperplasia   • Brain atrophy (CMS/HCC)   • Coronary artery disease involving native coronary artery of native heart   • Chronic constipation   • Dyspnea   • Encounter for screening colonoscopy   • History of penile implant   • Hyperlipidemia LDL goal <70   • Hypoesthesia of skin   • Idiopathic peripheral neuropathy   • Lumbar radiculopathy   • Malaise and fatigue   • Paroxysmal atrial fibrillation (CMS/HCC)   • Systolic heart failure (CMS/HCC)   • Weakness   • 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 Possibly could be atrophy but cannot completely exclude hydrocephaly.   • Atrial fibrillation (CMS/HCC)    • Back pain    • Brain atrophy (CMS/HCC)    • CAD (coronary artery disease)    • Constipation    • Hyperlipidemia    • Hypersomnia    • Melanoma (CMS/HCC)     SCALP   • Memory deficit    • Neuropathy    • Radiculopathy      Past Surgical History:   Procedure Laterality Date   • AV NODE ABLATION     • CARDIAC ELECTROPHYSIOLOGY PROCEDURE N/A 2019    Procedure: DEVICE IMPLANT;  Surgeon: Leighton Granda DO;   Location:  [facility] INVASIVE LOCATION;  Service: Cardiology   • CATARACT EXTRACTION     • COLONOSCOPY      <5years, clear   • CORONARY ANGIOPLASTY WITH STENT PLACEMENT      [place]   • PENILE PROSTHESIS IMPLANT     • SCALP/NECK TISSUE EXPANDER INSERTION N/A 9/11/2018    Procedure: Procedure performed:     1) Excision malignant neoplasm of skin of scalp, 6 cm x 5.5 cm, subfascial    2) full-thickness skin graft closure of 6 cm x 5.5 cm    3) left selective neck dissection (level IIA and IIB)    4) right sentinel lymph node biopsy.    5) complex closure skin of right neck, 10 cm ;  Surgeon: Eric Silva MD;  Location:  [facility] OR;  Service: ENT     General Information     Row Name 12/09/19 1511          PT Evaluation Time/Intention    Document Type  therapy note (daily note)  -MP     Mode of Treatment  physical therapy  -MP     Row Name 12/09/19 1511          General Information    Existing Precautions/Restrictions  cardiac;fall;pacemaker  -MP     Row Name 12/09/19 1511          Cognitive Assessment/Intervention- PT/OT    Orientation Status (Cognition)  oriented to;person  -MP     Cognitive Assessment/Intervention Comment  Baseline dementia.  -MP       User Key  (r) = Recorded By, (t) = Taken By, (c) = Cosigned By    Initials Name Provider Type    MP Pete Solano, PT Physical Therapist        Mobility     Row Name 12/09/19 1513          Bed Mobility Assessment/Treatment    Bed Mobility Assessment/Treatment  sit-supine;supine-sit;rolling right  -MP     Rolling Right [place] (Bed Mobility)  moderate assist (50% patient effort);1 person assist;verbal cues  -MP     Supine-Sit [place] (Bed Mobility)  moderate assist (50% patient effort);1 person assist;verbal cues  -MP     Sit-Supine [place] (Bed Mobility)  maximum assist (25% patient effort);1 person assist  -MP     Comment (Bed Mobility)  VC's needed for sequencing and hand placement.  -MP     Row Name 12/09/19 1510          Transfer  Assessment/Treatment    Comment (Transfers)  Noted posterior lean; pt able to self correct with VC's; manual cues needed to for gripping walker  -MP     Row Name 12/09/19 1513          Sit-Stand Transfer    Sit-Stand [place] (Transfers)  moderate assist (50% patient effort);1 person assist;verbal cues  -MP     Assistive Device (Sit-Stand Transfers)  walker, front-wheeled  -MP     Row Name 12/09/19 1513          Gait/Stairs Assessment/Training    [place] Level (Gait)  unable to assess  -MP     Comment (Gait/Stairs)  Gait deferred secondary to noted decline in functional mobility at this date; unsafe to attempt. Pt able to take ~2 steps forward with mod.Ax1 and max verbal cues for LE advancement.  -MP       User Key  (r) = Recorded By, (t) = Taken By, (c) = Cosigned By    Initials Name Provider Type    Pete Perez PT Physical Therapist        Obj/Interventions     Row Name 12/09/19 1523          Static Sitting Balance    Level of [place] (Unsupported Sitting, Static Balance)  moderate assist, 50 to 74% patient effort;1 person assist  -MP     Sitting Position (Unsupported Sitting, Static Balance)  sitting on edge of bed  -MP     Time Able to Maintain Position (Unsupported Sitting, Static Balance)  1 to 2 minutes  -MP     Comment (Unsupported Sitting, Static Balance)  Progressed to moments of min.A; frequent VC needed to promote weight shift to midline.  -MP     Row Name 12/09/19 1523          Dynamic Sitting Balance    Level of [place], Reaches Outside Midline (Sitting, Dynamic Balance)  moderate assist, 50 to 74% patient effort;1 person assist  -MP     Sitting Position, Reaches Outside Midline (Sitting, Dynamic Balance)  sitting on edge of bed  -MP     Row Name 12/09/19 1523          Static Standing Balance    Level of [place] (Supported Standing, Static Balance)  moderate assist, 50 to 74% patient effort;1 person assist  -MP     Assistive Device Utilized (Supported Standing, Static  Balance)  walker, rolling  -MP     Row Name 12/09/19 1523          Dynamic Standing Balance    Level of [place], Reaches Outside Midline (Standing, Dynamic Balance)  maximal assist, 25 to 49% patient effort  -MP     Time Able to Maintain Position, Reaches Outside Midline (Standing, Dynamic Balance)  2 to 3 minutes  -MP     Assistive Device Utilized (Supported Standing, Dynamic Balance)  walker, rolling  -MP     Comment, Reaches Outside Midline (Standing, Dynamic Balance)  Noted LOB with standing weight shift; pt able to correct.  -MP       User Key  (r) = Recorded By, (t) = Taken By, (c) = Cosigned By    Initials Name Provider Type    MP Pete Solano, PT Physical Therapist        Goals/Plan    No documentation.       Clinical Impression     Row Name 12/09/19 1524          Pain Assessment    Additional Documentation  Pain Scale: Numbers Pre/Post-Treatment (Group)  -MP     Row Name 12/09/19 1524          Pain Scale: Numbers Pre/Post-Treatment    Pain Scale: Numbers, Pretreatment  0/10 - no pain  -MP     Pain Scale: Numbers, Post-Treatment  0/10 - no pain  -MP     [facility] Name 12/09/19 1524          Vital Signs    Pre Systolic BP Rehab  134  -MP     Pre Treatment Diastolic BP  82  -MP     Post Systolic BP Rehab  134  -MP     Post Treatment Diastolic BP  77  -MP     Pretreatment Heart Rate (beats/min)  90  -MP     Posttreatment Heart Rate (beats/min)  88  -MP     Pre SpO2 (%)  94  -MP     O2 Delivery Pre Treatment  room air  -MP     O2 Delivery Intra Treatment  room air  -MP     Post SpO2 (%)  94  -MP     O2 Delivery Post Treatment  room air  -MP     Pre Patient Position  Supine  -MP     Intra Patient Position  Standing  -MP     Post Patient Position  Supine  -MP     Row Name 12/09/19 1524          Positioning and Restraints    Pre-Treatment Position  in bed  -MP     Post Treatment Position  bed  -MP     In Bed  notified nsg;supine;call light within reach;encouraged to call for assist;with family/caregiver;side rails  up x3  -MP       User Key  (r) = Recorded By, (t) = Taken By, (c) = Cosigned By    Initials Name Provider Type    Pete Perez PT Physical Therapist        Outcome Measures     Row Name 12/09/19 1526          How much help from another person do you currently need...    Turning from your back to your side while in flat bed without using bedrails?  3  -MP     Moving from lying on back to sitting on the side of a flat bed without bedrails?  2  -MP     Moving to and from a bed to a chair (including a wheelchair)?  2  -MP     Standing up from a chair using your arms (e.g., wheelchair, bedside chair)?  2  -MP     Climbing 3-5 steps with a railing?  2  -MP     To walk in hospital room?  2  -MP     AM-PAC 6 Clicks Score (PT)  13  -MP     Row Name 12/09/19 1526          Functional Assessment    Outcome Measure Options  AM-PAC 6 Clicks Basic Mobility (PT)  -MP       User Key  (r) = Recorded By, (t) = Taken By, (c) = Cosigned By    Initials Name Provider Type    Pete Perez PT Physical Therapist          PT Recommendation and Plan     Plan of Care Reviewed With: patient, daughter  Progress: declining  Outcome Summary: Noted decline in functional mobility during session. Pt required mod.Ax1 for sitting unsupported EOB; required mod.Ax1 for sit to stand transfer with frequent VC to correct posterior lean. Pt took ~2 steps fwd and back with max. VC for LE advancement. Cont PT POC.     Time Calculation:   PT Charges     Row Name 12/09/19 1400             Time Calculation    Start Time  1400  -MP      PT Received On  12/09/19  -         Time Calculation- PT    Total Timed Code Minutes- PT  30 minute(s)  -MP         Timed Charges    22073 - PT Therapeutic Activity Minutes  30  -MP        User Key  (r) = Recorded By, (t) = Taken By, (c) = Cosigned By    Initials Name Provider Type    Pete Perez PT Physical Therapist        Therapy Charges for Today     Code Description Service Date Service Provider Modifiers  Qty    [id number]  PT THERAPEUTIC ACT EA 15 MIN 12/9/2019 Pete Solano, PT GP 2          PT G-Codes  Outcome Measure Options: AM-PAC 6 Clicks Basic Mobility (PT)  AM-PAC 6 Clicks Score (PT): 13    Pete Solano, PT  12/9/2019

## 2019-12-09 NOTE — PLAN OF CARE
Problem: Patient Care Overview  Goal: Interprofessional Rounds/Family Conf  Flowsheets (Taken 12/9/2019 1526)  Summary: Pt has been napping today but arouses easily and is calm. Pt has short term memory deficits. Pt is very weak and needs a lot of assist to get up to the bsc.  Dtr Maria C (Chrissy/[address] [place] [address] on living will) at the bedside. Pt cannot return to [facility]. Family looking at snf/rehab. Discussed palliative care support at snf vs longterm placement with hospice. Family leaning towards rehab if pt is able to particpate but will continue to monitor. seroquel reduced due to daytime fatigue/weakness  Note:   Team Meeting 1200 Vincent Roberts DO, Odilia Rodriguez LCSW, Ela VILLANUEVA, Vicki Ordoñez RN CHPN, Jena Huertas RN CHPN, Miriam Sebastian RN, CHPN

## 2019-12-09 NOTE — PLAN OF CARE
Problem: Patient Care Overview  Goal: Plan of Care Review  Outcome: Ongoing (interventions implemented as appropriate)  Flowsheets (Taken 12/9/2019 1523)  Progress: declining  Plan of Care Reviewed With: patient; daughter  Outcome Summary: Noted decline in functional mobility during today's PTx. Pt required mod.Ax1 for sitting supported EOB; required mod.Ax1 for sit to stand transfer with frequent VC for correction of posterior lean. Pt took ~2 steps fwd and back with max. VC for LE advancement. Cont PT POC.

## 2019-12-09 NOTE — PROGRESS NOTES
Continued Stay Note  [facility]     Patient Name: Ayan Pichardo  MRN: [id number]  Today's Date: 12/9/2019    Admit Date: 12/5/2019    Discharge Plan     Row Name 12/09/19 1047       Plan    Plan Comments  CM spoke with pts daughters who report he will not be able to return to [facility]. They are interested in a selfpay long term care bed at either [facility], [facility] or [facility]. Referrals will be made.         Discharge Codes    No documentation.       Expected Discharge Date and Time     Expected Discharge Date Expected Discharge Time    Dec 10, 2019             Tarah Botello RN

## 2019-12-09 NOTE — PROGRESS NOTES
"Clinical Nutrition Note      Patient Name: Ayan Pichardo  MRN: [id number]  Admission date: 12/5/2019      Multidisciplinary Rounds    Additional information obtained during MDR:  RN reports pt is s/p PPM placement; K+ and phos replaced . He is ready for transfer to floor or possibly back to [facility] after PT evaluation.    Current diet: Diet Regular    Pertinent medical data reviewed  No nutrition risk identified on nursing screen; MST score \"0\"    Intervention:  Plan of care and goals reviewed    Monitor:  RD to follow per protocol      Indiana Ireland MS,RD,LD  12/09/19 10:52 AM  Time: 20 mins       "

## 2019-12-09 NOTE — PLAN OF CARE
Patient is confused most of the day. Family requested information on nursing homes. Case management following. Vitals stable. Family at bedside all day. Not strong enough to ambulate. Stood up with gait belt and walker to bedside commode. Did not walk with PT, lack of strength. Transfer orders to floor. Will continue to monitor.

## 2019-12-10 PROCEDURE — 97110 THERAPEUTIC EXERCISES: CPT

## 2019-12-10 PROCEDURE — 99232 SBSQ HOSP IP/OBS MODERATE 35: CPT | Performed by: INTERNAL MEDICINE

## 2019-12-10 PROCEDURE — 97116 GAIT TRAINING THERAPY: CPT

## 2019-12-10 RX ORDER — IBUPROFEN 400 MG/1
400 TABLET ORAL EVERY 8 HOURS PRN
Status: DISCONTINUED | OUTPATIENT
Start: 2019-12-10 | End: 2019-12-11 | Stop reason: HOSPADM

## 2019-12-10 RX ADMIN — FAMOTIDINE 20 MG: 20 TABLET ORAL at 17:46

## 2019-12-10 RX ADMIN — IBUPROFEN 400 MG: 400 TABLET, FILM COATED ORAL at 21:20

## 2019-12-10 RX ADMIN — FAMOTIDINE 20 MG: 20 TABLET ORAL at 09:00

## 2019-12-10 RX ADMIN — CARVEDILOL 12.5 MG: 12.5 TABLET, FILM COATED ORAL at 17:46

## 2019-12-10 RX ADMIN — ACETAMINOPHEN 650 MG: 325 TABLET ORAL at 02:54

## 2019-12-10 RX ADMIN — ROSUVASTATIN CALCIUM 10 MG: 10 TABLET, COATED ORAL at 21:20

## 2019-12-10 RX ADMIN — CARVEDILOL 12.5 MG: 12.5 TABLET, FILM COATED ORAL at 09:00

## 2019-12-10 RX ADMIN — LISINOPRIL 10 MG: 10 TABLET ORAL at 09:00

## 2019-12-10 RX ADMIN — MEMANTINE 10 MG: 10 TABLET ORAL at 21:20

## 2019-12-10 RX ADMIN — CETIRIZINE HYDROCHLORIDE 10 MG: 10 TABLET, FILM COATED ORAL at 09:00

## 2019-12-10 RX ADMIN — SERTRALINE HYDROCHLORIDE 50 MG: 50 TABLET ORAL at 21:20

## 2019-12-10 NOTE — PLAN OF CARE
Problem: Patient Care Overview  Goal: Interprofessional Rounds/Family Conf  Flowsheets (Taken 12/10/2019 1420)  Summary: Patient improved today - family optimistic, looking to rehab.  Palliative Care continues to follow for support and assist with plan of care.  Participants: advanced practice nurse; nursing; physician; social work/services  Note:   1300 Palliative Care Interdisciplinary Rounds - Palliative Care Team members present:  BRIANNA Lozano MD; KEAGAN Rolon MD; ROB Vance APRN; ROB Chavez APRN; DANIEL Huertas RN, CHPN; DAYANA Ordoñez RN, CHPN; DAYANA Rodriguez [facility], [facility]; SHAYY Sebastian RN, CHPN

## 2019-12-10 NOTE — PLAN OF CARE
"  Problem: Patient Care Overview  Goal: Plan of Care Review  Outcome: Ongoing (interventions implemented as appropriate)  Flowsheets (Taken 12/10/2019 0617)  Progress: no change  Plan of Care Reviewed With: daughter  Outcome Summary: Patient rested well this shift. family refused scheduled dose of seroquel to \"help patient not be so drowsy.\" Patient up to BSC with 2-3 assist. Adequate UOP. Daughter has remained at bedside with patient. One PRN dose of tylenol given for pain. Telemetry and all monitors removed per family request and pending med/surg bed transfer. Patient and daughter deny any further questions or concerns. Daughter hopeful patient will be discharged soon. Will continue to monitor and assess.     "

## 2019-12-10 NOTE — PLAN OF CARE
Problem: Palliative Care (Adult)  Intervention: Support/Optimize Psychosocial Response  Note:   Visit with patient and family at bedside - patient up to chair, drinking coffee, has his own clothes on, ate breakfast, and denies symptoms.  Family excited that patient is feeling better.  Patient tolerated symptom assessment, family receptive to supportive/empathic presence and active listening.

## 2019-12-10 NOTE — PROGRESS NOTES
Intensivist Note     12/9/2019  Hospital Day: 4  3 Days Post-Op  ICU Stays Timeline      Dates and times are displayed in the time zone of the admission          Hospital Admission: 12/05/19 1551 - Current  ICU stays: 2      In Date/Time Event Department ICU Stay Duration     12/05/19 1551 Admission [facility] EMERGENCY DEPT      12/05/19 1938 Transfer In  [facility] 2B ICU 23 hours 6 minutes     12/06/19 1844 Transfer In  [facility] EP LAB      12/06/19 2023 Transfer In [facility] 2B ICU 3 days 26 minutes                Mr. Ayan Pichardo, 87 y.o. 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  Resolving    Essential hypertension    Hyperlipidemia LDL goal <70       SUBJECTIVE     87-year-old white male lifelong non-smoker who lives in an assisted living facility due to Alzheimer's disease.  Patient has a history of CAD and previous stent, PAF not on anticoagulation, previous AV nella ablation, CAD, hypertension, and hyperlipidemia.  Apparently the patient was complaining of fatigue and was noted to have bradycardia down into the 40s the week PTA despite reducing his Coreg dose.  His physician insisted he go to the ER and he arrived at [facility] the afternoon of 12/5/2019.  Was noted to have alternating CHB and 2-1 AV conduction with RBBB and LAFB.  Heart rates were in the 30s but blood pressure in the ER was 229/100.  Cardiology recommended he be admitted to the ICU where he was begun on Isopril drip.  Because of the significant risk of cardiac syncope and subsequent falls it was decided to place a dual-chamber PPM and this was performed 12/6/2019.  He was subsequently resumed on his home Coreg and lisinopril and it was felt that he could be discharged back to his nursing facility.    Interval history:  "Unfortunately post procedure the patient has been quite debilitated.  There is been a great deal of difficulty with ambulation requiring multiple individuals to support him (not a problem prior to this hospitalization).  In addition has been increasingly confused at night and at times combative requiring Seroquel which makes him more lethargic during the day.  Today the patient is quite sleepy but he can be aroused.  Very confused and does not follow all of my commands.  When directly questioned he denies any specific pain or dyspnea.  O2 sats are 94% on room air.  Family very concerned about his status specifically worsening confusion, inability to mobilize without extensive help, and his ability to be transferred back to his facility.    ROS: Unable to obtain at this point due to patient's altered mental status    The patient's relevant PMH, PSH, FH, and SH were reviewed and updated in Epic as appropriate. Allergies and Medications reviewed.    OBJECTIVE     /80 (BP Location: Left arm, Patient Position: Lying)   Pulse 80   Temp 98.2 °F (36.8 °C) (Axillary)   Resp 18   Ht 188 cm (74.02\")   Wt 86.4 kg (190 lb 7.6 oz)   SpO2 94%   BMI 24.45 kg/m²      Flow (L/min): 2    Flowsheet Rows      First Filed Value   Admission Height  185.4 cm (73\") Documented at 12/05/2019 1538   Admission Weight  88.5 kg (195 lb) Documented at 12/05/2019 1538        Intake & Output (last day)   Incomplete and not reliable       Exam:  General Exam:  Elderly lethargic white male sitting up in chair sleeping.  NAD  HEENT: Pupils equal and reactive. Nose and throat clear.  Neck:                          Supple, no JVD, thyromegaly, or adenopathy  Lungs: Few scattered rhonchi  Cardiovascular: RRR.  Paced rhythm at 80 bpm  Abdomen: Soft nontender without organomegaly or masses.   and rectal: Deferred.  Extremities: No cyanosis clubbing edema.  Neurologic:                 Symmetric strength but diffusely weak. No focal deficits.  " Confused and sleepy    Chest X-Ray: No film today.  Last film was 12/7/2019 revealing the presence of a dual-chamber PPM.  Heart size was mildly enlarged and there was some congestive changes as well as what appears to be a left pleural effusion + /- atelectasis      Results from last 7 days   Lab Units 12/07/19  0420 12/06/19  0548 12/05/19  1604   WBC 10*3/mm3 9.54 9.59 7.19   HEMOGLOBIN g/dL 14.3 15.0 14.6   HEMATOCRIT % 46.4 47.6 44.2   PLATELETS 10*3/mm3 127* 150 154     Results from last 7 days   Lab Units 12/09/19  0509 12/07/19  0420   SODIUM mmol/L 143 141   POTASSIUM mmol/L 3.2* 4.1   CHLORIDE mmol/L 109* 112*   CO2 mmol/L 20.0* 16.0*   BUN mg/dL 40* 29*   CREATININE mg/dL 1.13 1.34*   GLUCOSE mg/dL 102* 102*   CALCIUM mg/dL 9.7 9.5     Results from last 7 days   Lab Units 12/09/19  0509 12/07/19  0420 12/06/19  1201 12/05/19  1604   MAGNESIUM mg/dL  --  2.1 2.0 2.2   PHOSPHORUS mg/dL 2.2* 2.3* 2.7  --      Results from last 7 days   Lab Units 12/05/19  1604   ALK PHOS U/L 89   BILIRUBIN mg/dL 0.4   ALT (SGPT) U/L 12   AST (SGOT) U/L 23       Lab Results   Component Value Date    SEDRATE 2 06/12/2019     No results found for: BNP  Lab Results   Component Value Date    TROPONINI <0.012 09/02/2018    TROPONINT 0.072 (C) 12/06/2019     Lab Results   Component Value Date    TSH 2.030 12/05/2019     No results found for: LACTATE  No results found for: CORTISOL      I reviewed the patient's results, images and medication.    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  Resolving    Essential hypertension    Hyperlipidemia LDL goal <70      DISCUSSION: Unfortunate situation.  It appears the patient's 2 daughters now think that they made a mistake in deciding to  proceed with PPM in view of their father's fairly advanced Alzheimer and what appears to be a marked decline in his status even prior to this hospitalization.  I discussed the situation in detail with both of the daughters and reviewed his CODE STATUS.  They both indicate that at this time they would like him to be DNR/DNI receiving only antimicrobial therapy if necessary in his usual cardiac medicines.  They know that he is not going to be able to get back to his assisted living at this time (if ever) and we decided to have palliative care looking in on them and possibly transition to hospice if his overall status does not improve.  They specifically request that he not be transferred to a telemetry unit as they do not feel he should be monitored despite his recent pacemaker placement.  I tend to agree with them especially in view of his recent decline in appetite and functional ability.    PLAN     1.  DNR/DNI  2.  Cardiology has resumed his home Coreg and Crestor  3.  Despite his high risk of stroke due to PAF anticoagulation is not an option because of his dementia and high risk of falls  4.  Transfer to non-telemetry bed and will ask the hospitalists to assume care until we can have him placed in rehab or a nursing facility  5.  Appreciate palliative care's help.  We both agree that if he does not improve regarding appetite and functional capability, we may have to look into hospice rather than rehab    Plan of care and goals reviewed with mulitdisciplinary team at daily rounds.    I discussed the patient's findings and my recommendations with patient, family, nursing staff and consulting provider    High level of risk due to: severe exacerbation of chronic illness, illness with threat to life or bodily function and parenteral controlled substances.    Time spent Critical care 40 min (It does not include procedure time).    Eric Langley MD  Intensive Care Medicine  12/09/19 9:11 PM

## 2019-12-10 NOTE — PROGRESS NOTES
Intensivist Note     12/10/2019  Hospital Day: 5  4 Days Post-Op  ICU Stays Timeline      Dates and times are displayed in the time zone of the admission          Hospital Admission: 12/05/19 1551 - Current  ICU stays: 2      In Date/Time Event Department ICU Stay Duration     12/05/19 1551 Admission [facility] EMERGENCY DEPT      12/05/19 1938 Transfer In  [facility] 2B ICU 23 hours 6 minutes     12/06/19 1844 Transfer In  [facility] EP LAB      12/06/19 2023 Transfer In [facility] 2B ICU 3 days 17 hours 8 minutes     12/10/19 1331 Transfer In [facility] 4H                 Mr. Ayan Pichardo, 87 y.o. 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  Resolving    Essential hypertension    Hyperlipidemia LDL goal <70       SUBJECTIVE     87-year-old white male lifelong non-smoker who lives in an assisted living facility due to Alzheimer's disease.  Patient has a history of CAD and previous stent, PAF not on anticoagulation, previous AV nella ablation, CAD, hypertension, and hyperlipidemia.  Apparently the patient was complaining of fatigue and was noted to have bradycardia down into the 40s the week PTA despite reducing his Coreg dose.  His physician insisted he go to the ER and he arrived at [facility] the afternoon of 12/5/2019.  Was noted to have alternating CHB and 2-1 AV conduction with RBBB and LAFB.  Heart rates were in the 30s but blood pressure in the ER was 229/100.  Cardiology recommended he be admitted to the ICU where he was begun on Isopril drip.  Because of the significant risk of cardiac syncope and subsequent falls it was decided to place a dual-chamber PPM and this was performed 12/6/2019.  He was subsequently resumed on his home Coreg and lisinopril and it was felt that he could be discharged  back to his nursing facility.     Interval history:  Post procedure the patient had a great deal of difficulty with ambulation requiring multiple individuals to support him and he remained quite debilitated (had not been a problem prior to this hospitalization).  In addition had become increasingly confused at night and at times combative requiring Seroquel.  This however made him very lethargic during the day and poorly responsive with poor oral intake.  I first saw the patient 12/9/2019 and he was lethargic but arousable.  Sleepy but he can be aroused.  Was confused and would not follow my commands and when directly questioned would deny pain or dyspnea.  There was no problem with gas exchange.  Family was concerned about his status regarding worsening confusion, inability to mobilize, and realized he would not be able to be transferred back to his independent living facility.   Both the daughters were nurses and we had long discussions at which point it was decided that we would continue routine medical care but CODE STATUS would be changed to DNR/DNI.  Today the patient appears much improved.  He is sitting up in a chair and eating his breakfast without difficulty.  Answered me appropriately but is somewhat confused as we go along with his underlying dementia.  We are awaiting a floor bed, and going to watch him for a couple of days to see if he improves enough to return to his assisted living situation.  If he deteriorates would probably go home with hospice.  If however he maintains his present status (which today is much better than yesterday), he could go back to his assisted living, or go home with home health.       ROS: Per subjective, all other systems reviewed and were negative.    The patient's relevant PMH, PSH, FH, and SH were reviewed and updated in Epic as appropriate. Allergies and Medications reviewed.    OBJECTIVE     /72 (BP Location: Left arm, Patient Position: Sitting)   Pulse 79    "Temp 98.2 °F (36.8 °C) (Oral)   Resp 16   Ht 188 cm (74.02\")   Wt 86.4 kg (190 lb 7.6 oz)   SpO2 94%   BMI 24.45 kg/m²      Flow (L/min): 2    Flowsheet Rows      First Filed Value   Admission Height  185.4 cm (73\") Documented at 12/05/2019 1538   Admission Weight  88.5 kg (195 lb) Documented at 12/05/2019 1538        Intake & Output (last day)       12/09 0701 - 12/10 0700 12/10 0701 - 12/11 0700    P.O. 240     Total Intake(mL/kg) 240 (2.8)     Urine (mL/kg/hr)      Stool      Total Output      Net +240           Urine Unmeasured Occurrence 1 x 1 x    Stool Unmeasured Occurrence 3 x 1 x          Exam:  General Exam:  Elderly white male sitting up in a chair eating breakfast.  Response to me appropriately  HEENT: Pupils equal and reactive. Nose and throat clear.  Neck:                          Supple, no JVD, thyromegaly, or adenopathy  Lungs: Clear anteriorly and laterally  Cardiovascular: Regular rate and rhythm without murmurs or gallops.  HR 80 bpm  Abdomen: Soft nontender without organomegaly or masses.   and rectal: Deferred.  Extremities: No cyanosis clubbing edema.  Neurologic:                 Symmetric strength and seems much stronger today. No focal deficits.  Slightly confused but answers appropriately today.    Chest X-Ray: No film today      Results from last 7 days   Lab Units 12/07/19  0420 12/06/19  0548 12/05/19  1604   WBC 10*3/mm3 9.54 9.59 7.19   HEMOGLOBIN g/dL 14.3 15.0 14.6   HEMATOCRIT % 46.4 47.6 44.2   PLATELETS 10*3/mm3 127* 150 154     Results from last 7 days   Lab Units 12/09/19  2201 12/09/19  0509 12/07/19  0420   SODIUM mmol/L  --  143 141   POTASSIUM mmol/L 4.5 3.2* 4.1   CHLORIDE mmol/L  --  109* 112*   CO2 mmol/L  --  20.0* 16.0*   BUN mg/dL  --  40* 29*   CREATININE mg/dL  --  1.13 1.34*   GLUCOSE mg/dL  --  102* 102*   CALCIUM mg/dL  --  9.7 9.5     Results from last 7 days   Lab Units 12/09/19  2201 12/09/19  0509 12/07/19  0420 12/06/19  1201  12/05/19  1604 "   MAGNESIUM mg/dL  --   --  2.1 2.0  --  2.2   PHOSPHORUS mg/dL 3.6 2.2* 2.3* 2.7   < >  --     < > = values in this interval not displayed.     Results from last 7 days   Lab Units 12/05/19  1604   ALK PHOS U/L 89   BILIRUBIN mg/dL 0.4   ALT (SGPT) U/L 12   AST (SGOT) U/L 23       Lab Results   Component Value Date    SEDRATE 2 06/12/2019     No results found for: BNP  Lab Results   Component Value Date    TROPONINI <0.012 09/02/2018    TROPONINT 0.072 (C) 12/06/2019     Lab Results   Component Value Date    TSH 2.030 12/05/2019     No results found for: LACTATE  No results found for: CORTISOL    I reviewed the patient's results, images and medication.    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  Resolving    Essential hypertension    Hyperlipidemia LDL goal <70      DISCUSSION: Much improved and awaiting bed on the floor.  Will ask the hospitalist to assume attending role while we are waiting to see if he improves enough to return to his assisted living facility.  Family has requested that  help them with home health should he go home.    PLAN     1.  Transfer to non-telemetry bed per family request  2.  Family requested no further labs be obtained  3.  Physical therapy to work with the patient and help decision-making process regarding his capabilities and whether he can go back to assisted living, nursing home, or back home  4.  Family requested Seroquel be discontinued  5.  Continue low-dose Lovenox while in hospital    Plan of care and goals reviewed with mulitdisciplinary team at daily rounds.    I discussed the patient's findings and my recommendations with patient, family and nursing staff    High level of risk due to: severe exacerbation of  chronic illness, illness with threat to life or bodily function and parenteral controlled substances.    Time spent Critical care 20 min (It does not include procedure time).    Eric Langley MD  Intensive Care Medicine  12/10/19 2:31 PM

## 2019-12-10 NOTE — PLAN OF CARE
Problem: Patient Care Overview  Goal: Plan of Care Review  Outcome: Ongoing (interventions implemented as appropriate)  Flowsheets (Taken 12/10/2019 1522)  Progress: improving  Plan of Care Reviewed With: patient;spouse;daughter  Outcome Summary: Pt demonstrated increased indep with transfers today; able to perform 230 ft gait with use of RW, min A, constant cues for posture/ increasing step length. Cont to be limited by decreased safety awareness and noted initial post lean upon standing. Edu pt/family re: seated HEP and progression of PT POC. Will cont PT POC as clinically appropriate.

## 2019-12-10 NOTE — THERAPY TREATMENT NOTE
Patient Name: Ayan Pichardo  : 1932    MRN: [id number]                              Today's Date: 12/10/2019       Admit Date: 2019    Visit Dx:     ICD-10-CM ICD-9-CM   1. Second degree type II atrioventricular block I44.1 426.12   2. General weakness R53.1 780.79   3. Nonspecific chest pain R07.9 786.50   4. History of coronary artery disease Z86.79 V12.59   5. Complete heart block (CMS/HCC) I44.2 426.0     Patient Active Problem List   Diagnosis   • Balance problem   • Memory deficit   • BMI 25.0-25.9,adult   • Nonsmoker   • Recurrent malignant melanoma of skin (CMS/HCC)   • PAF not on anticoagulation    • Essential hypertension   • Benign prostatic hyperplasia   • Brain atrophy (CMS/HCC)   • CAD with history of JESSICA    • Chronic constipation   • Dyspnea   • Encounter for screening colonoscopy   • History of penile implant   • Hyperlipidemia LDL goal <70   • Hypoesthesia of skin   • Idiopathic peripheral neuropathy   • Lumbar radiculopathy   • Malaise and fatigue   • Paroxysmal atrial fibrillation (CMS/HCC)   • Systolic heart failure (CMS/HCC)   • Weakness   • 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  Resolving   • 2:1 complete AV block with associated traumatic bradycardia and near syncope (CMS/HCC)   • Placement of dual-chamber PPM 2019     Past Medical History:   Diagnosis Date   • Abnormal brain CT 2016    Overview:  Dilated lateral and 3rd ventricle. Possibly could be atrophy but cannot completely exclude hydrocephaly.   • Atrial fibrillation (CMS/HCC)    • Back pain    • Brain atrophy (CMS/HCC)    • CAD (coronary artery disease)    • Constipation    • Hyperlipidemia    • Hypersomnia    • Melanoma (CMS/HCC)     SCALP   • Memory deficit    • Neuropathy    • Radiculopathy      Past Surgical History:   Procedure Laterality Date   • AV NODE ABLATION     • CARDIAC  ELECTROPHYSIOLOGY PROCEDURE N/A 12/6/2019    Procedure: DEVICE IMPLANT;  Surgeon: Leighton Granda DO;  Location:  [facility];  Service: Cardiology   • CATARACT EXTRACTION     • COLONOSCOPY      <5years, clear   • CORONARY ANGIOPLASTY WITH STENT PLACEMENT      [place]   • PENILE PROSTHESIS IMPLANT     • SCALP/NECK TISSUE EXPANDER INSERTION N/A 9/11/2018    Procedure: Procedure performed:     1) Excision malignant neoplasm of skin of scalp, 6 cm x 5.5 cm, subfascial    2) full-thickness skin graft closure of 6 cm x 5.5 cm    3) left selective neck dissection (level IIA and IIB)    4) right sentinel lymph node biopsy.    5) complex closure skin of right neck, 10 cm ;  Surgeon: Eric Silva MD;  Location:  [facility];  Service: ENT     General Information     Row Name 12/10/19 1518          PT Evaluation Time/Intention    Document Type  therapy note (daily note)  -     Mode of Treatment  physical therapy  -     Row Name 12/10/19 1518          General Information    Existing Precautions/Restrictions  cardiac;fall;pacemaker  -     Row Name 12/10/19 1518          Cognitive Assessment/Intervention- PT/OT    Orientation Status (Cognition)  oriented to;person;situation  -       User Key  (r) = Recorded By, (t) = Taken By, (c) = Cosigned By    Initials Name Provider Type    LS Jena Whitaker, PT Physical Therapist        Mobility     Row Name 12/10/19 1519          Bed Mobility Assessment/Treatment    Comment (Bed Mobility)  UIC  -     Row Name 12/10/19 1519          Transfer Assessment/Treatment    Comment (Transfers)  Post lean upon initial stand; vc's for weightshifting hips over JERROD  -     Row Name 12/10/19 1519          Sit-Stand Transfer    Sit-Stand [place] (Transfers)  minimum assist (75% patient effort);verbal cues  -     Assistive Device (Sit-Stand Transfers)  walker, front-wheeled  -     Row Name 12/10/19 1519          Gait/Stairs Assessment/Training    Gait/Stairs  Assessment/Training  gait/ambulation independence  -     [place] Level (Gait)  minimum assist (75% patient effort);verbal cues;1 person to manage equipment  -     Assistive Device (Gait)  walker, front-wheeled  -     Distance in Feet (Gait)  230  -LS     Deviations/Abnormal Patterns (Gait)  bilateral deviations;alonzo decreased;stride length decreased  -LS     Bilateral Gait Deviations  forward flexed posture;heel strike decreased  -     Comment (Gait/Stairs)  VC's for upright posture, keeping RW close to body, and increasing step length/heel strike bilat. Noted decreased quality of gait with fatigue; 2 brief standing rest breaks.   -       User Key  (r) = Recorded By, (t) = Taken By, (c) = Cosigned By    Initials Name Provider Type     Jena Whitaker PT Physical Therapist        Obj/Interventions     Row Name 12/10/19 1521          Therapeutic Exercise    Upper Extremity Range of Motion (Therapeutic Exercise)  elbow flexion/extension, bilateral;shoulder abduction/adduction, bilateral;other (see comments) cross body reaches  -LS     Lower Extremity (Therapeutic Exercise)  LAQ (long arc quad), bilateral  -     Lower Extremity Range of Motion (Therapeutic Exercise)  ankle dorsiflexion/plantar flexion, bilateral;hip flexion/extension, bilateral  -LS     Exercise Type (Therapeutic Exercise)  AROM (active range of motion)  -LS     Position (Therapeutic Exercise)  seated  -     Sets/Reps (Therapeutic Exercise)  1/10  -     Row Name 12/10/19 1521          Static Sitting Balance    Level of [place] (Unsupported Sitting, Static Balance)  supervision  -     Sitting Position (Unsupported Sitting, Static Balance)  sitting in chair  -     Row Name 12/10/19 1521          Static Standing Balance    Level of [place] (Supported Standing, Static Balance)  minimal assist, 75% patient effort  -     Assistive Device Utilized (Supported Standing, Static Balance)  walker, rolling  -LS     Comment  (Supported Standing, Static Balance)  initial post lean; progressed to CGA  -       User Key  (r) = Recorded By, (t) = Taken By, (c) = Cosigned By    Initials Name Provider Type    Jena Laws, PT Physical Therapist        Goals/Plan    No documentation.       Clinical Impression     Row Name 12/10/19 1522          Pain Assessment    Additional Documentation  Pain Scale: Numbers Pre/Post-Treatment (Group)  -     Row Name 12/10/19 1522          Pain Scale: Numbers Pre/Post-Treatment    Pain Scale: Numbers, Pretreatment  0/10 - no pain  -     Pain Scale: Numbers, Post-Treatment  0/10 - no pain  -     Row Name 12/10/19 1522          Plan of Care Review    Plan of Care Reviewed With  patient;spouse;daughter  -     Progress  improving  -     Outcome Summary  Pt demonstrated increased indep with transfers today; able to perform 230 ft gait with use of RW, min A, constant cues for posture/ increasing step length. Cont to be limited by decreased safety awareness and noted initial post lean upon standing. Edu pt/family re: seated HEP and progression of PT POC. Will cont PT POC as clinically appropriate.   -     Row Name 12/10/19 1522          Vital Signs    Pre Systolic BP Rehab  -- no tele intact; RN present and gave consent for therapy  -LS     O2 Delivery Pre Treatment  room air  -LS     O2 Delivery Intra Treatment  room air  -LS     O2 Delivery Post Treatment  room air  -LS     Pre Patient Position  Sitting  -LS     Post Patient Position  Sitting  -     Row Name 12/10/19 1522          Positioning and Restraints    Pre-Treatment Position  sitting in chair/recliner  -LS     Post Treatment Position  chair  -LS     In Chair  notified nsg;call light within reach;encouraged to call for assist;exit alarm on;sitting;with nsg;with family/caregiver  -       User Key  (r) = Recorded By, (t) = Taken By, (c) = Cosigned By    Initials Name Provider Type    Jena Laws, PT Physical Therapist        Outcome  Measures     Row Name 12/10/19 1526          How much help from another person do you currently need...    Turning from your back to your side while in flat bed without using bedrails?  3  -LS     Moving from lying on back to sitting on the side of a flat bed without bedrails?  3  -LS     Moving to and from a bed to a chair (including a wheelchair)?  3  -LS     Standing up from a chair using your arms (e.g., wheelchair, bedside chair)?  3  -LS     Climbing 3-5 steps with a railing?  1  -LS     To walk in hospital room?  3  -LS     AM-PAC 6 Clicks Score (PT)  16  -LS       User Key  (r) = Recorded By, (t) = Taken By, (c) = Cosigned By    Initials Name Provider Type    Jena Laws, PT Physical Therapist          PT Recommendation and Plan  Planned Therapy Interventions (PT Eval): balance training, bed mobility training, gait training, home exercise program, patient/family education, strengthening, transfer training  Outcome Summary/Treatment Plan (PT)  Anticipated Discharge Disposition (PT): home with assist, home with home health, other (see comments)(return to indep living facility with assist of spouse/ DTRs)  Plan of Care Reviewed With: patient, spouse, daughter  Progress: improving  Outcome Summary: Pt demonstrated increased indep with transfers today; able to perform 230 ft gait with use of RW, min A, constant cues for posture/ increasing step length. Cont to be limited by decreased safety awareness and noted initial post lean upon standing. Edu pt/family re: seated HEP and progression of PT POC. Will cont PT POC as clinically appropriate.      Time Calculation:   PT Charges     Row Name 12/10/19 1527             Time Calculation    Start Time  1118  -LS      PT Received On  12/10/19  -         Time Calculation- PT    Total Timed Code Minutes- PT  23 minute(s)  -LS         Timed Charges    33842 - PT Therapeutic Exercise Minutes  7  -LS      17964 - Gait Training Minutes   16  -LS        User Key  (r) =  Recorded By, (t) = Taken By, (c) = Cosigned By    Initials Name Provider Type     Jena Whitaker, PT Physical Therapist        Therapy Charges for Today     Code Description Service Date Service Provider Modifiers Qty    02001971054 HC PT THER PROC EA 15 MIN 12/10/2019 Jena Whitaker, PT GP 1    [id number]  GAIT TRAINING EA 15 MIN 12/10/2019 Jena Whitaker, PT GP 1          PT G-Codes  Outcome Measure Options: AM-PAC 6 Clicks Basic Mobility (PT)  AM-PAC 6 Clicks Score (PT): 16    Jena Whitaker, PT  12/10/2019

## 2019-12-10 NOTE — CONSULTS
"Adult Nutrition  Assessment/PES    Patient Name:  Ayan Pichardo  YOB: 1932  MRN: [id number]  Admit Date:  12/5/2019    Assessment Date:  12/10/2019    Comments: Met w/ pt's and his 2 daughter explained menu selection process and alternate selections available ; pt is receiving Ensure HP w/ meals. He uses this at home. Recommend multivitamin/mineral supplement w/ marginal po intake. RD will follow.    Reason for Assessment     Row Name 12/10/19 1453          Reason for Assessment    Reason For Assessment  per organizational policy MDR, LOS review; 30 mins     Diagnosis  cardiac disease;neurologic conditions adm w/ symptomatic bradycardia HTN, s/p PPM placement HX: Alzheimer's dementia     Identified At Risk by Screening Criteria  no indicators present         Nutrition/Diet History     Row Name 12/10/19 1454          Nutrition/Diet History    Typical Food/Fluid Intake  RN reports pt improving no episodes of agitation last night , needs transfer to floor and PT eval for dc planning. Daughters report he is eating poorly less than 25% of meals.     Supplemental Drinks/Foods/Additives  Darcy Ensure HP - drinks this well at home.     Factors Affecting Nutritional Intake  impaired cognitive status/motor control         Anthropometrics     Row Name 12/10/19 1501          Anthropometrics    Height  188 cm (74\")     Current Weight Method  measured     Weight  86.2 kg (190 lb)        Admit Weight    Admit Weight Method  measured     Admit Weight  86.2 kg (190 lb)        Ideal Body Weight (IBW)    Ideal Body Weight (IBW) (kg)  87.66     % Ideal Body Weight  98.32        Body Mass Index (BMI)    BMI (kg/m2)  24.45     BMI Assessment  BMI 18.5-24.9: normal         Labs/Tests/Procedures/Meds     Row Name 12/10/19 1501          Labs/Procedures/Meds    Lab Results Reviewed  reviewed, pertinent     Lab Results Comments  low K+, Phos -replaced per RN        Diagnostic Tests/Procedures    Diagnostic Test/Procedure " Reviewed  reviewed, pertinent        Medications    Pertinent Medications Reviewed  reviewed, pertinent                Nutrition Prescription Ordered     Row Name 12/10/19 1503          Nutrition Prescription PO    Current PO Diet  Regular     Fluid Consistency  Thin         Evaluation of Received Nutrient/Fluid Intake     Row Name 12/10/19 1504          PO Evaluation    Number of Days PO Intake Evaluated  Other (comment) LOS review     Number of Meals  6     % PO Intake  40%; NPO x 2; 5 meals not documented - daughter report 10-25% intake of meals           Problem/Interventions:  Problem 1     Row Name 12/10/19 1507          Nutrition Diagnoses Problem 1    Problem 1  Predicted Suboptimal Intake     Etiology (related to)  Medical Diagnosis     Cardiac  Other (comment) AV Block s/p PPM placement     Neurological  Alzheimer's;Dementia;AMS     Signs/Symptoms (evidenced by)  Report of Mnimal PO Intake;PO Intake     Percent (%) intake recorded  40 %     Over number of meals  6               Intervention Goal     Row Name 12/10/19 1508          Intervention Goal    General  Nutrition support treatment;Meet nutritional needs for age/condition     PO  Increase intake         Nutrition Intervention     Row Name 12/10/19 1507          Nutrition Intervention    RD/Tech Action  Advise alternate selection;Menu provided;Follow Tx progress;Care plan reviewd;Encourage intake;Supplement provided           Education/Evaluation     Row Name 12/10/19 1501          Monitor/Evaluation    Monitor  Per protocol;PO intake;Symptoms;Supplement intake;Weight           Electronically signed by:  Indiana Ireland MS,RD,LD  12/10/19 3:09 PM

## 2019-12-11 ENCOUNTER — TELEPHONE (OUTPATIENT)
Dept: FAMILY MEDICINE CLINIC | Facility: CLINIC | Age: 84
End: 2019-12-11

## 2019-12-11 VITALS
DIASTOLIC BLOOD PRESSURE: 104 MMHG | RESPIRATION RATE: 18 BRPM | SYSTOLIC BLOOD PRESSURE: 155 MMHG | BODY MASS INDEX: 24.38 KG/M2 | OXYGEN SATURATION: 94 % | HEART RATE: 89 BPM | WEIGHT: 190 LBS | TEMPERATURE: 97.8 F | HEIGHT: 74 IN

## 2019-12-11 PROCEDURE — 99239 HOSP IP/OBS DSCHRG MGMT >30: CPT | Performed by: INTERNAL MEDICINE

## 2019-12-11 RX ORDER — MEMANTINE HYDROCHLORIDE 10 MG/1
10 TABLET ORAL NIGHTLY
Start: 2019-12-11

## 2019-12-11 RX ORDER — IBUPROFEN 400 MG/1
400 TABLET ORAL EVERY 8 HOURS PRN
Start: 2019-12-11 | End: 2020-01-01 | Stop reason: CLARIF

## 2019-12-11 RX ORDER — NITROGLYCERIN 0.4 MG/1
0.4 TABLET SUBLINGUAL
Refills: 12
Start: 2019-12-11

## 2019-12-11 RX ADMIN — LISINOPRIL 10 MG: 10 TABLET ORAL at 08:52

## 2019-12-11 RX ADMIN — CARVEDILOL 12.5 MG: 12.5 TABLET, FILM COATED ORAL at 08:52

## 2019-12-11 RX ADMIN — FAMOTIDINE 20 MG: 20 TABLET ORAL at 08:52

## 2019-12-11 RX ADMIN — IBUPROFEN 400 MG: 400 TABLET, FILM COATED ORAL at 08:52

## 2019-12-11 NOTE — DISCHARGE PLACEMENT REQUEST
"Case management [phone]  Please contact patient daughter Maria C Leblanc for scheduling visits.   I will fax discharge summary when it is available.  Ayan Suarez (87 y.o. Male)     Date of Birth Social Security Number Address Home Phone MRN    06/05/1932  [address]  [address]  [facility] [address] [phone] [id number]    Mormon Marital Status          Moravian        Admission Date Admission Type Admitting Provider Attending Provider Department, Room/Bed    12/5/19 Emergency Isabel Wyatt MD Hunter, Sarah M, MD [facility] 4H, S473/1    Discharge Date Discharge Disposition Discharge Destination                       Attending Provider:  Isabel Wyatt MD    Allergies:  No Known Allergies    Isolation:  None   Infection:  None   Code Status:  No CPR    Ht:  188 cm (74\")   Wt:  86.2 kg (190 lb)    Admission Cmt:  None   Principal Problem:  2:1 complete AV block with associated traumatic bradycardia and near syncope (CMS/HCC) [I44.2]                 Active Insurance as of 12/5/2019     Primary Coverage     Payor Plan Insurance Group Employer/Plan Group    ANTHEM MEDICARE REPLACEMENT ANTHEM MEDICARE ADVANTAGE [id number]     Payor Plan Address Payor Plan Phone Number Payor Plan Fax Number Effective Dates    [address] [phone]  1/1/2018 - None Entered    [facility] [address]       Subscriber Name Subscriber Birth Date Member ID       AYAN SUAREZ 6/5/1932 [id number]                 Emergency Contacts      (Rel.) Home Phone Work Phone Mobile Phone    Dea Suarez (Spouse) [phone] -- [phone]    LeblancMaria C (Daughter) [phone] -- [phone]    Deep Stein (Daughter) -- -- [phone]        [address]  [address] [facility] [address]  Phone:  [phone]  Fax:   Date: Dec 11, 2019      Ambulatory Referral to Home Health     Patient:  Ayan Suarez MRN:  [id number]   [address]  [address]  [facility] [address] :  1932  SSN:    Phone: [phone] Sex:  M      INSURANCE PAYOR PLAN GROUP # SUBSCRIBER ID   Primary:    ANTHEM MEDICARE REPLACEMENT [id number]      Referring Provider Information:  ELIZABETH ARTHUR Phone: [phone] Fax:       Referral Information:   # Visits:  1 Referral Type: Home Health [42]   Urgency:  Routine Referral Reason: Specialty Services Required   Start Date: Dec 11, 2019 End Date:  To be determined by Insurer   Diagnosis: Second degree type II atrioventricular block (I44.1 [ICD-10-CM] 426.12 [ICD-9-CM])  Alzheimer's dementia without behavioral disturbance, unspecified timing of dementia onset (CMS/HCC) (G30.9,F02.80 [ICD-10-CM] 331.0,294.10 [ICD-9-CM])  Complete AV block (CMS/HCC) (I44.2 [ICD-10-CM] 426.0 [ICD-9-CM])  Presence of permanent cardiac pacemaker (Z95.0 [ICD-10-CM] V45.01 [ICD-9-CM])  Systolic heart failure, unspecified HF chronicity (CMS/HCC) (I50.20 [ICD-10-CM] 428.20 [ICD-9-CM])      Refer to Dept:   Refer to Provider:   Refer to Facility:       Face to Face Visit Date: 2019  Follow-up provider for Plan of Care? I treated the patient in an acute care facility and will not continue treatment after discharge.  Follow-up provider: VIELKA MACK [[id number]]  Reason/Clinical Findings: 2:1 complete AV block with associated traumatic bradycardia and near syncope, Placement of dual-chamber PPM 2019  Describe mobility limitations that make leaving home difficult: dementia, impaired physical mobility and gait  Nursing/Therapeutic Services Requested: Skilled Nursing  Nursing/Therapeutic Services Requested: Physical Therapy  Nursing/Therapeutic Services Requested: Occupational Therapy  Skilled nursing orders: Medication education  Skilled nursing orders: Cardiopulmonary assessments  PT orders: Gait Training  PT orders: Strengthening  PT orders: Home safety assessment  Weight Bearing Status: As Tolerated  Occupational  orders: Activities of daily living  Occupational orders: Energy conservation  Occupational orders: Strengthening  Occupational orders: Cognition  Frequency: 1 Week 1     This document serves as a request of services and does not constitute Insurance authorization or approval of services.  To determine eligibility, please contact the members Insurance carrier to verify and review coverage.     If you have medical questions regarding this request for services. Please contact [address] at [phone] during normal business hours.       Verbal Order Mode: Verbal with readback   Authorizing Provider: Isabel Wyatt MD  Authorizing Provider's NPI: [id number]     Order Entered By: Andrea Santiago RN 12/11/2019  9:24 AM     Electronically signed by:                 History & Physical      Leighton Granda DO at 12/06/19 8335          H&P updated. The patient was examined and the following changes are noted:  as per my note         Electronically signed by Leighton Granda DO at 12/06/19 1528   Source Note     Attestation signed by Leighton Granda DO at 12/06/19 3011    I have reviewed the documentation above and agree.            Cardiac Electrophysiology Procedure Note      [facility] at [facility]           CARDIAC ELECTROPHYSIOLOGIST ADDENDUM    I have personally performed a face to face diagnostic evaluation on this patient.  I have reviewed the note authored by the advanced practice provider and agree with the history of present illness, past medical history, surgical history, social history, family history and review of systems.. I have reviewed current medications, and lab values.  My findings are below:  .    History of Present Illness:  87 y.o. male dementia who lives in assisted living.  Multiple family members are present including a daughter who is an RN at this hospital.    The patient is not reliably able to participate in ROS or HPI due to dementia.    Noted to have  "fatigue.  This was correlated with alternating complete heart block and 2:1 av conduction with RBBB and LAFB conduction.    Presently does not answer questions appropriately.  This is his baseline per wife and children.    Review of Systems:   · Not able to give due to dementia     Physical Exam   Vital Signs: /63 (BP Location: Left arm, Patient Position: Lying)   Pulse (!) 45   Temp 98.7 °F (37.1 °C) (Oral)   Resp 16   Ht 188 cm (74\")   Wt 86.6 kg (190 lb 14.7 oz)   SpO2 98%   BMI 24.51 kg/m²        Admission Weight: 88.5 kg (195 lb)     General appearance: Alert oriented and cooperative.  In no acute distress  Skin:  Warm and Dry to touch  Head: Normocephalic, without obvious abnormality, atraumatic.   Eyes: Conjunctivae unremarkable, EOM's intact.Sclera non icteric.  Neck: No JVD, No carotid bruit. Neck supple, trachea midline  Lungs: Clear to auscultation bilaterally, no use of accessory muscles.    Heart:: bradycardia S1S2  Abdomen: Soft, non-tender. Bowel sounds normal.  Extremities: No edema.  Neurologic: Oriented to time, person and place, affect appropriate.  No focal/major motor or sensory defects noted.    Psychiatric:  dementia is present.    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  This patient has significant risk of cardiac syncope and sudden death as well as significant injury which would occur with syncope and falls.  The family understands his and accept the procedural risk of complication associated with PPM implantation.  The daughter who is an RN is POA and has provided written informed consent.  The patient who really is not able to understand the scenario is in agreement to proceed but is clearly not decisional.       Leighton Granda, DO                           Cardiac Electrophysiology In Patient Consultation        [facility]     Consultation      PATIENT " NAME:  Ayan Pichardo    :  1932 AGE: 87 y.o.     Date of Admission:  2019  Date of Consultation:  2019    Subjective      REASON FOR CONSULT: Atrioventricular Block    CHIEF COMPLAINT: Weakness, Fatigue, SOA, Dizziness    Problem List:      1. Complete Heart Block  a. 2019 ED presentation with EKG revealing 2:1 complete AV block with heart rate 30 to 40 bpm.  2. Paroxysmal Atrial Fibrillation  a. CHADSVASc = 5 not on anticoagulation  b. Diagnosed / Documented 2008 and started on Amiodarone  c. ECV 2008 ( data deficit )  d. AF ablation 2008 with successful RFA catheter ablation of atrial fibrillation with a circumferential pulmonary vein ablation approach including roof and lateral posterior line  e. Atrial flutter /atrial fibrillation ablation 6/15/2015 with successful pulmonary vein isolation with segment ostial ablation of each vein, and successful ablation of atrial flutter with creation of bidirectional block in the cavotricuspid isthmus.  3. Coronary artery disease  a. Non-ST elevated MI 3/8/2011  b. LHC 3/9/2011 with JESSICA to RCA and LAD.  LVEF 46% /data deficit  4. Chronic Systolic Heart Failure  a. ECHO 2008 LVEF 40% /data deficit  b. ECHO 2013 LVEF 70% /data deficit  c. ECHO 6/15/2015 LVEF 57% /data deficit  5. Essential Hypertension  6. Dyslipidemia  7. Coronary Artery Disease  a. Reported coronary stent approximately 10 years ago at [facility] /data deficit  8. Dementia  9. BPH      HISTORY OF PRESENT ILLNESS:  Mr Pichardo is a 87 year old white male with a past medical history significant for paroxysmal atrial fibrillation, hypertension, dyslipidemia, dementia, and coronary artery disease.  Mr. Pichardo lives at a independent living facility here in [place] and was having physical therapy with a vital sign check revealing bradycardia 20 to 30 bpm with systolic blood pressure in the 170s. It was recommended that he present to the local emergency  room.  Upon presentation he was noted to be in complete heart block 30 to 40 bpm.  He was admitted to the ICU with electrophysiology consultation today per Dr. Granda today.  Upon review of history with patient and family it is reported that he has daily symptoms of increasing weakness, fatigue, SOA, & AGUILERA that are noted to be worse during the morning hours after awakening.  Mr. Pichardo uses a walker for mobility due to gait instability and frequent falls 1-2 times per week with his worst injury occurring approximately 2 weeks ago hitting his head on the kitchen counter.  Patient denies symptoms of palpitations, presyncope, or syncope but the family is unsure if this is why he has frequent falls in light of the patient having dementia and being a poor historian.  The patient does endorse occasional symptoms of chest pain described as a 4/10 in the center of his chest that is transient and relieved with rest.  Patient does report occasionally taking his wife's sublingual nitroglycerin for his symptoms.  He reports taking 2 nitroglycerin 5 days ago with resolution of his symptoms.  Patient admits compliance to his medication therapy with a recent decrease in his Coreg per his PCP for bradycardia which has not helped his slow heart rates.  Patient and family deny any recent signs of infection with fever, sweats, or chills.       PAST MEDICAL HISTORY  Past Medical History:   Diagnosis Date   • Abnormal brain CT 6/2/2016    Overview:  Dilated lateral and 3rd ventricle. Possibly could be atrophy but cannot completely exclude hydrocephaly.   • Atrial fibrillation (CMS/HCC)    • Back pain    • Brain atrophy (CMS/HCC)    • CAD (coronary artery disease)    • Constipation    • Hyperlipidemia    • Hypersomnia    • Melanoma (CMS/HCC)     SCALP   • Memory deficit    • Neuropathy    • Radiculopathy        SURGICAL HISTORY   has a past surgical history that includes Penile prosthesis implant; AV node ablation; Scalp/Neck Tissue  Expander Insertion (N/A, 9/11/2018); Cataract extraction; Coronary angioplasty with stent; and Colonoscopy.     SOCIAL HISTORY  Social History     Socioeconomic History   • Marital status:      Spouse name: Not on file   • Number of children: Not on file   • Years of education: Not on file   • Highest education level: Not on file   Tobacco Use   • Smoking status: Never Smoker   • Smokeless tobacco: Never Used   Substance and Sexual Activity   • Alcohol use: No   • Drug use: No   • Sexual activity: Defer       FAMILY HISTORY  family history includes Cancer in his mother; Hypertension in his mother.     MEDICATIONS  Prior to Admission medications    Medication Sig Start Date End Date Taking? Authorizing Provider   carvedilol (COREG) 6.25 MG tablet Take 6.25 mg by mouth 2 (Two) Times a Day With Meals.   Yes Dee Palm MD   cetirizine (zyrTEC) 10 MG tablet Take 10 mg by mouth Daily.   Yes Dee Palm MD   lisinopril (PRINIVIL,ZESTRIL) 10 MG tablet Take 1 tablet by mouth Daily. 8/27/19  Yes Isma May MD   meloxicam (MOBIC) 15 MG tablet Take 1 tablet by mouth Daily As Needed for Mild Pain . 8/27/19  Yes Isma May MD   memantine (NAMENDA) 10 MG tablet Take 10 mg by mouth every night at bedtime.   Yes ProviderDee MD   sertraline (ZOLOFT) 100 MG tablet Take 1 tablet by mouth Daily. 8/27/19  Yes Isma May MD   simvastatin (ZOCOR) 10 MG tablet Take 1 tablet by mouth Every Night. 8/27/19  Yes Isma May MD   nitroglycerin (NITROSTAT) 0.4 MG SL tablet DISSOLVE 1 TABLET UNDER THE TONGUE AS NEEDED FOR CHEST PAIN EVERY 5 MINUTES UP TO 3 TIMES. IF NO RELIEF CALL 911. 10/1/19   Isma May MD       ALLERGIES  No Known Allergies    REVIEW OF SYSTEMS    Constitutional: No fevers or chills, no recent weight gain or weight loss, + Weakness, fatigue  Eyes: No visual loss, blurred vision, double vision, yellow sclerae.  ENT: No headaches, hearing loss,  "vertigo, congestion or sore throat.   Cardiovascular: Per HPI  Respiratory: No cough or wheezing, no sputum production, no hematemesis, + SOA, AGUILERA   Gastrointestinal: No abdominal pain, no nausea, vomiting, constipation, diarrhea, melena.   Genitourinary: No dysuria, hematuria or increased frequency.  Musculoskeletal:  + gait disturbance, weakness   Integumentary: No rashes, urticaria, ulcers or sores.   Neurological: No headache, syncope, paralysis, ataxia, no prior CVA/TIA, + dizziness  Psychiatric: No anxiety, or depression  Endocrine: No diaphoresis, cold or heat intolerance. No polyuria or polydipsia.   Hematologic/Lymphatic: No anemia, abnormal bruising or bleeding. No history of DVT/PE.       Objective     VITAL SIGNS: /84 (BP Location: Left arm, Patient Position: Lying)   Pulse 62   Temp 98.7 °F (37.1 °C) (Oral)   Resp 18   Ht 188 cm (74\")   Wt 86.6 kg (190 lb 14.7 oz)   SpO2 97%   BMI 24.51 kg/m²       ADMIT WEIGHT:  88.5 kg (195 lb)  BMI: Body mass index is 24.51 kg/m².    Admission Weight: 88.5 kg (195 lb)     PHYSICAL EXAM  General appearance: Awake, alert, cooperative, Oriented to person / place  Head: Normocephalic, without obvious abnormality, atraumatic  Eyes: Conjunctivae/corneas clear, EOMs intact  Neck: no adenopathy, no carotid bruit, no JVD and thyroid: not enlarged  Lungs: clear to auscultation bilaterally and no rhonchi or crackles\", ' symmetric  Heart: irregular rate and rhythm, S1, S2 normal, no murmur, click, rub or gallop  Abdomen: Soft, non-tender, bowel sounds normal,  no organomegaly  Extremities: extremities normal, atraumatic, no cyanosis or edema  Skin: Skin color, turgor normal, no rashes or lesions  Neurologic: Grossly normal     CBC:   Results from last 7 days   Lab Units 12/06/19  0548 12/05/19  1604   WBC 10*3/mm3 9.59 7.19   HEMOGLOBIN g/dL 15.0 14.6   HEMATOCRIT % 47.6 44.2   MCV fL 104.6* 100.7*   PLATELETS 10*3/mm3 150 154         BMP:  Results from last 7 days "   Lab Units 12/05/19  1604   POTASSIUM mmol/L 4.7   CHLORIDE mmol/L 105   CO2 mmol/L 24.0   BUN mg/dL 28*   CREATININE mg/dL 1.29*   GLUCOSE mg/dL 98   CALCIUM mg/dL 10.0   MAGNESIUM mg/dL 2.2     PT/INR:   Results from last 7 days   Lab Units 12/06/19  0549   PROTIME Seconds 14.0   INR  1.13     APTT:     MAG:   Results from last 7 days   Lab Units 12/05/19  1604   MAGNESIUM mg/dL 2.2       CURRENT MEDICATIONS:    Current Facility-Administered Medications:   •  acetaminophen (TYLENOL) tablet 650 mg, 650 mg, Oral, Q4H PRN, 650 mg at 12/05/19 2050 **OR** acetaminophen (TYLENOL) suppository 650 mg, 650 mg, Rectal, Q4H PRN, Maribel Meier MD  •  bisacodyl (DULCOLAX) EC tablet 5 mg, 5 mg, Oral, Daily PRN, Maribel Meier MD  •  dextrose 5 % and sodium chloride 0.9 % infusion, 100 mL/hr, Intravenous, Continuous, Maribel Meier MD, Last Rate: 100 mL/hr at 12/05/19 1949, 100 mL/hr at 12/05/19 1949  •  docusate sodium (COLACE) capsule 100 mg, 100 mg, Oral, BID, Maribel Meier MD, 100 mg at 12/05/19 2050  •  famotidine (PEPCID) tablet 20 mg, 20 mg, Oral, BID AC, Maribel Meier MD, 20 mg at 12/05/19 2050  •  heparin (porcine) 5000 UNIT/ML injection 5,000 Units, 5,000 Units, Subcutaneous, Q12H, Mariebl Meier MD, 5,000 Units at 12/05/19 2050  •  hydrALAZINE (APRESOLINE) injection 20 mg, 20 mg, Intravenous, Q4H PRN, Maribel Meier MD, 20 mg at 12/06/19 0640  •  isoproterenol (ISUPREL) 1,000 mcg in sodium chloride 0.9 % 250 mL (4 mcg/mL) infusion, 1 mcg/min, Intravenous, Continuous, Pretty, Annette Cate, DNP, APRN, Last Rate: 15 mL/hr at 12/05/19 2001, 1 mcg/min at 12/05/19 2001  •  memantine (NAMENDA) tablet 10 mg, 10 mg, Oral, Nightly, Maribel Meier MD, 10 mg at 12/05/19 2050  •  nitroglycerin (NITROSTAT) SL tablet 0.4 mg, 0.4 mg, Sublingual, Q5 Min PRN, Maribel Meier MD  •  ondansetron (ZOFRAN) tablet 4 mg, 4 mg, Oral, Q6H PRN **OR** ondansetron (ZOFRAN)  injection 4 mg, 4 mg, Intravenous, Q6H PRN, Maribel Meier MD  •  rosuvastatin (CRESTOR) tablet 10 mg, 10 mg, Oral, Nightly, Maribel Meier MD, 10 mg at 19  •  sertraline (ZOLOFT) tablet 50 mg, 50 mg, Oral, Nightly, Maribel Meier MD, 50 mg at 19  •  sodium chloride 0.9 % flush 10 mL, 10 mL, Intravenous, PRN, Anand Lopez MD  •  sodium chloride 0.9 % flush 10 mL, 10 mL, Intravenous, Q12H, Maribel Meier MD, 10 mL at 19  •  sodium chloride 0.9 % flush 10 mL, 10 mL, Intravenous, PRN, Maribel Meier MD  CONTINUOUS INFUSIONS:    dextrose 5 % and sodium chloride 0.9 % 100 mL/hr Last Rate: 100 mL/hr (19)   isoproterenol (ISUPREL) infusion 4 mcg/mL 1 mcg/min Last Rate: 1 mcg/min (19)         EK2019 1550 with CHB 37 bpm, incomplete RBBB  ECHO: Limited ECHO ordered for today / not yet performed       TELEMETRY: Atrial Fibrillation 40-50 bpm    Assessment      1.  Complete heart block  2.  Paroxysmal atrial fibrillation  3.  Essential hypertension  4.  Acute renal insufficiency  5.  Coronary artery disease  6.  Dementia    Plan        Mr. Pichardo is an elderly 87-year-old white male with the above noted past medical history who presents with complete heart block not associated with an identified transient or reversible cause.  He has a concerning history of multiple falls 1-2 times a week.  We will plan to proceed with permanent pacemaker implantation for complete heart block.  All risks, benefits, and alternatives to the procedure were discussed with the patient/family and they are willing to proceed at this time.    Electronically signed by RICH Saleem, 19, 9:31 AM.      This note was completed using a voice transcription system. Every effort was made to ensure accuracy. However, inadvertent computerized transcription errors may be present.    Electronically signed by Leighton Granda DO at 19  "1737             Curt Pate APRN at 12/06/19 0907     Attestation signed by Leighton Granda DO at 12/06/19 1737    I have reviewed the documentation above and agree.            Cardiac Electrophysiology Procedure Note      [facility] at [facility]           CARDIAC ELECTROPHYSIOLOGIST ADDENDUM    I have personally performed a face to face diagnostic evaluation on this patient.  I have reviewed the note authored by the advanced practice provider and agree with the history of present illness, past medical history, surgical history, social history, family history and review of systems.. I have reviewed current medications, and lab values.  My findings are below:  .    History of Present Illness:  87 y.o. male dementia who lives in assisted living.  Multiple family members are present including a daughter who is an RN at this hospital.    The patient is not reliably able to participate in ROS or HPI due to dementia.    Noted to have fatigue.  This was correlated with alternating complete heart block and 2:1 av conduction with RBBB and LAFB conduction.    Presently does not answer questions appropriately.  This is his baseline per wife and children.    Review of Systems:   · Not able to give due to dementia     Physical Exam   Vital Signs: /63 (BP Location: Left arm, Patient Position: Lying)   Pulse (!) 45   Temp 98.7 °F (37.1 °C) (Oral)   Resp 16   Ht 188 cm (74\")   Wt 86.6 kg (190 lb 14.7 oz)   SpO2 98%   BMI 24.51 kg/m²        Admission Weight: 88.5 kg (195 lb)     General appearance: Alert oriented and cooperative.  In no acute distress  Skin:  Warm and Dry to touch  Head: Normocephalic, without obvious abnormality, atraumatic.   Eyes: Conjunctivae unremarkable, EOM's intact.Sclera non icteric.  Neck: No JVD, No carotid bruit. Neck supple, trachea midline  Lungs: Clear to auscultation bilaterally, no use of accessory muscles.    Heart:: bradycardia S1S2  Abdomen: Soft, " non-tender. Bowel sounds normal.  Extremities: No edema.  Neurologic: Oriented to time, person and place, affect appropriate.  No focal/major motor or sensory defects noted.    Psychiatric:  dementia is present.    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  This patient has significant risk of cardiac syncope and sudden death as well as significant injury which would occur with syncope and falls.  The family understands his and accept the procedural risk of complication associated with PPM implantation.  The daughter who is an RN is POA and has provided written informed consent.  The patient who really is not able to understand the scenario is in agreement to proceed but is clearly not decisional.       Leighton Granda DO                           Cardiac Electrophysiology In Patient Consultation        [facility] at [facility]     Consultation      PATIENT NAME:  Ayan Pichardo    :  1932 AGE: 87 y.o.     Date of Admission:  2019  Date of Consultation:  2019    Subjective      REASON FOR CONSULT: Atrioventricular Block    CHIEF COMPLAINT: Weakness, Fatigue, SOA, Dizziness    Problem List:      10. Complete Heart Block  a. 2019 ED presentation with EKG revealing 2:1 complete AV block with heart rate 30 to 40 bpm.  11. Paroxysmal Atrial Fibrillation  a. CHADSVASc = 5 not on anticoagulation  b. Diagnosed / Documented 2008 and started on Amiodarone  c. ECV 2008 ( data deficit )  d. AF ablation 2008 with successful RFA catheter ablation of atrial fibrillation with a circumferential pulmonary vein ablation approach including roof and lateral posterior line  e. Atrial flutter /atrial fibrillation ablation 6/15/2015 with successful pulmonary vein isolation with segment ostial ablation of each vein, and successful ablation of atrial flutter with creation of bidirectional block in  the cavotricuspid isthmus.  12. Coronary artery disease  a. Non-ST elevated MI 3/8/2011  b. LHC 3/9/2011 with JESSICA to RCA and LAD.  LVEF 46% /data deficit  13. Chronic Systolic Heart Failure  a. ECHO 1/7/2008 LVEF 40% /data deficit  b. ECHO 1/23/2013 LVEF 70% /data deficit  c. ECHO 6/15/2015 LVEF 57% /data deficit  14. Essential Hypertension  15. Dyslipidemia  16. Coronary Artery Disease  a. Reported coronary stent approximately 10 years ago at [facility] /data deficit  17. Dementia  18. BPH      HISTORY OF PRESENT ILLNESS:  Mr Pichardo is a 87 year old white male with a past medical history significant for paroxysmal atrial fibrillation, hypertension, dyslipidemia, dementia, and coronary artery disease.  Mr. Pichardo lives at a independent living facility here in [place] and was having physical therapy with a vital sign check revealing bradycardia 20 to 30 bpm with systolic blood pressure in the 170s. It was recommended that he present to the local emergency room.  Upon presentation he was noted to be in complete heart block 30 to 40 bpm.  He was admitted to the ICU with electrophysiology consultation today per Dr. Granda today.  Upon review of history with patient and family it is reported that he has daily symptoms of increasing weakness, fatigue, SOA, & AGUILERA that are noted to be worse during the morning hours after awakening.  Mr. Pichardo uses a walker for mobility due to gait instability and frequent falls 1-2 times per week with his worst injury occurring approximately 2 weeks ago hitting his head on the kitchen counter.  Patient denies symptoms of palpitations, presyncope, or syncope but the family is unsure if this is why he has frequent falls in light of the patient having dementia and being a poor historian.  The patient does endorse occasional symptoms of chest pain described as a 4/10 in the center of his chest that is transient and relieved with rest.  Patient does report occasionally taking  his wife's sublingual nitroglycerin for his symptoms.  He reports taking 2 nitroglycerin 5 days ago with resolution of his symptoms.  Patient admits compliance to his medication therapy with a recent decrease in his Coreg per his PCP for bradycardia which has not helped his slow heart rates.  Patient and family deny any recent signs of infection with fever, sweats, or chills.       PAST MEDICAL HISTORY  Past Medical History:   Diagnosis Date   • Abnormal brain CT 6/2/2016    Overview:  Dilated lateral and 3rd ventricle. Possibly could be atrophy but cannot completely exclude hydrocephaly.   • Atrial fibrillation (CMS/HCC)    • Back pain    • Brain atrophy (CMS/HCC)    • CAD (coronary artery disease)    • Constipation    • Hyperlipidemia    • Hypersomnia    • Melanoma (CMS/HCC)     SCALP   • Memory deficit    • Neuropathy    • Radiculopathy        SURGICAL HISTORY   has a past surgical history that includes Penile prosthesis implant; AV node ablation; Scalp/Neck Tissue Expander Insertion (N/A, 9/11/2018); Cataract extraction; Coronary angioplasty with stent; and Colonoscopy.     SOCIAL HISTORY  Social History     Socioeconomic History   • Marital status:      Spouse name: Not on file   • Number of children: Not on file   • Years of education: Not on file   • Highest education level: Not on file   Tobacco Use   • Smoking status: Never Smoker   • Smokeless tobacco: Never Used   Substance and Sexual Activity   • Alcohol use: No   • Drug use: No   • Sexual activity: Defer       FAMILY HISTORY  family history includes Cancer in his mother; Hypertension in his mother.     MEDICATIONS  Prior to Admission medications    Medication Sig Start Date End Date Taking? Authorizing Provider   carvedilol (COREG) 6.25 MG tablet Take 6.25 mg by mouth 2 (Two) Times a Day With Meals.   Yes ProviderDee MD   cetirizine (zyrTEC) 10 MG tablet Take 10 mg by mouth Daily.   Yes ProviderDee MD   lisinopril  "(PRINIVIL,ZESTRIL) 10 MG tablet Take 1 tablet by mouth Daily. 8/27/19  Yes Isma May MD   meloxicam (MOBIC) 15 MG tablet Take 1 tablet by mouth Daily As Needed for Mild Pain . 8/27/19  Yes Isma May MD   memantine (NAMENDA) 10 MG tablet Take 10 mg by mouth every night at bedtime.   Yes Provider, MD Dee   sertraline (ZOLOFT) 100 MG tablet Take 1 tablet by mouth Daily. 8/27/19  Yes Isma May MD   simvastatin (ZOCOR) 10 MG tablet Take 1 tablet by mouth Every Night. 8/27/19  Yes Isma May MD   nitroglycerin (NITROSTAT) 0.4 MG SL tablet DISSOLVE 1 TABLET UNDER THE TONGUE AS NEEDED FOR CHEST PAIN EVERY 5 MINUTES UP TO 3 TIMES. IF NO RELIEF CALL 911. 10/1/19   Isma May MD       ALLERGIES  No Known Allergies    REVIEW OF SYSTEMS    Constitutional: No fevers or chills, no recent weight gain or weight loss, + Weakness, fatigue  Eyes: No visual loss, blurred vision, double vision, yellow sclerae.  ENT: No headaches, hearing loss, vertigo, congestion or sore throat.   Cardiovascular: Per HPI  Respiratory: No cough or wheezing, no sputum production, no hematemesis, + SOA, AGUILERA   Gastrointestinal: No abdominal pain, no nausea, vomiting, constipation, diarrhea, melena.   Genitourinary: No dysuria, hematuria or increased frequency.  Musculoskeletal:  + gait disturbance, weakness   Integumentary: No rashes, urticaria, ulcers or sores.   Neurological: No headache, syncope, paralysis, ataxia, no prior CVA/TIA, + dizziness  Psychiatric: No anxiety, or depression  Endocrine: No diaphoresis, cold or heat intolerance. No polyuria or polydipsia.   Hematologic/Lymphatic: No anemia, abnormal bruising or bleeding. No history of DVT/PE.       Objective     VITAL SIGNS: /84 (BP Location: Left arm, Patient Position: Lying)   Pulse 62   Temp 98.7 °F (37.1 °C) (Oral)   Resp 18   Ht 188 cm (74\")   Wt 86.6 kg (190 lb 14.7 oz)   SpO2 97%   BMI 24.51 kg/m²       ADMIT WEIGHT:  88.5 " "kg (195 lb)  BMI: Body mass index is 24.51 kg/m².    Admission Weight: 88.5 kg (195 lb)     PHYSICAL EXAM  General appearance: Awake, alert, cooperative, Oriented to person / place  Head: Normocephalic, without obvious abnormality, atraumatic  Eyes: Conjunctivae/corneas clear, EOMs intact  Neck: no adenopathy, no carotid bruit, no JVD and thyroid: not enlarged  Lungs: clear to auscultation bilaterally and no rhonchi or crackles\", ' symmetric  Heart: irregular rate and rhythm, S1, S2 normal, no murmur, click, rub or gallop  Abdomen: Soft, non-tender, bowel sounds normal,  no organomegaly  Extremities: extremities normal, atraumatic, no cyanosis or edema  Skin: Skin color, turgor normal, no rashes or lesions  Neurologic: Grossly normal     CBC:   Results from last 7 days   Lab Units 12/06/19  0548 12/05/19  1604   WBC 10*3/mm3 9.59 7.19   HEMOGLOBIN g/dL 15.0 14.6   HEMATOCRIT % 47.6 44.2   MCV fL 104.6* 100.7*   PLATELETS 10*3/mm3 150 154         BMP:  Results from last 7 days   Lab Units 12/05/19  1604   POTASSIUM mmol/L 4.7   CHLORIDE mmol/L 105   CO2 mmol/L 24.0   BUN mg/dL 28*   CREATININE mg/dL 1.29*   GLUCOSE mg/dL 98   CALCIUM mg/dL 10.0   MAGNESIUM mg/dL 2.2     PT/INR:   Results from last 7 days   Lab Units 12/06/19  0549   PROTIME Seconds 14.0   INR  1.13     APTT:     MAG:   Results from last 7 days   Lab Units 12/05/19  1604   MAGNESIUM mg/dL 2.2       CURRENT MEDICATIONS:    Current Facility-Administered Medications:   •  acetaminophen (TYLENOL) tablet 650 mg, 650 mg, Oral, Q4H PRN, 650 mg at 12/05/19 2050 **OR** acetaminophen (TYLENOL) suppository 650 mg, 650 mg, Rectal, Q4H PRN, Maribel Meier MD  •  bisacodyl (DULCOLAX) EC tablet 5 mg, 5 mg, Oral, Daily PRN, Maribel Meier MD  •  dextrose 5 % and sodium chloride 0.9 % infusion, 100 mL/hr, Intravenous, Continuous, Maribel Meier MD, Last Rate: 100 mL/hr at 12/05/19 1949, 100 mL/hr at 12/05/19 1949  •  docusate sodium (COLACE) " capsule 100 mg, 100 mg, Oral, BID, Maribel Meier MD, 100 mg at 19  •  famotidine (PEPCID) tablet 20 mg, 20 mg, Oral, BID AC, Maribel Meier MD, 20 mg at 19  •  heparin (porcine) 5000 UNIT/ML injection 5,000 Units, 5,000 Units, Subcutaneous, Q12H, Maribel Meier MD, 5,000 Units at 19  •  hydrALAZINE (APRESOLINE) injection 20 mg, 20 mg, Intravenous, Q4H PRN, Maribel Meier MD, 20 mg at 19  •  isoproterenol (ISUPREL) 1,000 mcg in sodium chloride 0.9 % 250 mL (4 mcg/mL) infusion, 1 mcg/min, Intravenous, Continuous, Annette Pretty, SHAUN, APRN, Last Rate: 15 mL/hr at 19, 1 mcg/min at 19  •  memantine (NAMENDA) tablet 10 mg, 10 mg, Oral, Nightly, Maribel Meier MD, 10 mg at 19  •  nitroglycerin (NITROSTAT) SL tablet 0.4 mg, 0.4 mg, Sublingual, Q5 Min PRN, Maribel Meier MD  •  ondansetron (ZOFRAN) tablet 4 mg, 4 mg, Oral, Q6H PRN **OR** ondansetron (ZOFRAN) injection 4 mg, 4 mg, Intravenous, Q6H PRN, Maribel Meier MD  •  rosuvastatin (CRESTOR) tablet 10 mg, 10 mg, Oral, Nightly, Maribel Meier MD, 10 mg at 19  •  sertraline (ZOLOFT) tablet 50 mg, 50 mg, Oral, Nightly, Maribel Meier MD, 50 mg at 19  •  sodium chloride 0.9 % flush 10 mL, 10 mL, Intravenous, PRN, Anand Lopez MD  •  sodium chloride 0.9 % flush 10 mL, 10 mL, Intravenous, Q12H, Maribel Meier MD, 10 mL at 19  •  sodium chloride 0.9 % flush 10 mL, 10 mL, Intravenous, PRN, Maribel Meier MD  CONTINUOUS INFUSIONS:    dextrose 5 % and sodium chloride 0.9 % 100 mL/hr Last Rate: 100 mL/hr (19)   isoproterenol (ISUPREL) infusion 4 mcg/mL 1 mcg/min Last Rate: 1 mcg/min (19)         EK2019 1550 with CHB 37 bpm, incomplete RBBB  ECHO: Limited ECHO ordered for today / not yet performed       TELEMETRY: Atrial Fibrillation 40-50  bpm    Assessment      1.  Complete heart block  2.  Paroxysmal atrial fibrillation  3.  Essential hypertension  4.  Acute renal insufficiency  5.  Coronary artery disease  6.  Dementia    Plan        Mr. Pichardo is an elderly 87-year-old white male with the above noted past medical history who presents with complete heart block not associated with an identified transient or reversible cause.  He has a concerning history of multiple falls 1-2 times a week.  We will plan to proceed with permanent pacemaker implantation for complete heart block.  All risks, benefits, and alternatives to the procedure were discussed with the patient/family and they are willing to proceed at this time.    Electronically signed by RICH Saleem, 12/06/19, 9:31 AM.      This note was completed using a voice transcription system. Every effort was made to ensure accuracy. However, inadvertent computerized transcription errors may be present.    Electronically signed by Leighton Granda DO at 12/06/19 3011     Maribel Meier MD at 12/05/19 1803              ICU ADMISSION NOTE    Chief complaint Bradycardia     Subjective     Ayan Pichardo is a 87 y.o. male that presents to [facility] ED on 12/5 with complaints of slow heart rate associated with worsening generalized weakness over the past week.     He has a history of atrial fibrillation not on anticoagulation, CAD, hyperlipidemia, hypertension, brain atrophy, and Alzheimer's dementia.      Per the daughter, his heart rate has been intermittently low in the 40s over the past week with recent reduction in carvedilol dose from 12.5mg BID to 6.25mg daily as noted in Dr. May's office note from 11/22/19.     On ED arrival EKG shows second degree Mobitz 2 heart block with rates 26-32 and is hypertensive with BP 170s-229/.  Labs mostly unrevealing apart from sCr 1.29. CXR negative for acute cardiopulmonary process, but does recognize some aortic arch prominence with follow-up  CT imaging recommended. Troponin negative and thyroid studies WNL.     Dr. Lopez discussed the patient's status with Dr. Montiel who recommends ICU admission. His presentation does not appear to be consistent with beta-blocker overdose, but a dose of Zofran and Glucagon was administered without improvement in rate.     The patient is a poor historian with history of Alzheimer's dementia on memantine with impaired balance and previous frequent falls who recently moved to [place] this summer. He lives at the [facility] with his wife and his daughter is very participative in his care.      ATTENDING ADDITIONAL HPI: 87-year-old gentleman, lifetime non-smoker, moved to [place] 5 months ago to be near family.  He presented with bradycardia with associated dizziness.  He does have a previous history of cardiac catheterization with stent.  He does not recall the year.  He took Plavix for a while but it was stopped.  He currently denies angina.  He does admit to fatigue.  In addition, he has a history of paroxysmal atrial fibrillation.  He had 2 episodes requiring AV node ablation.  This was performed in [facility] [place].  He has been working with physical therapy as an outpatient for some balance problems.  They noted his severe low heart rate and notified his family physician.  His Coreg dose has been decreased and he is currently taking 3.125 mg once a day.  In the emergency room his heart rate was 30 and he had Mobitz type II, dropping a beat after a P wave.  His blood pressure has been elevated in the emergency room.  In addition to Coreg he also takes lisinopril.  His serum creatinine was elevated in the emergency room at 1.  2 9 compared to a baseline of 0.9 in June.  He has no history of thyroid disease and his TSH was 2.    Review of Systems  Review of Systems   Constitutional: Positive for fatigue. Negative for fever.   HENT: Negative.    Eyes: Negative for visual  "disturbance.   Respiratory: Positive for shortness of breath. Negative for apnea and cough.    Cardiovascular: Negative for chest pain, palpitations and leg swelling.        \"Low heart rate\"    Gastrointestinal: Positive for constipation. Negative for abdominal pain.   Endocrine: Negative.    Genitourinary: Positive for frequency. Negative for dysuria.   Musculoskeletal: Positive for arthralgias, back pain and neck pain.   Neurological: Positive for dizziness, weakness and light-headedness.   Psychiatric/Behavioral: Positive for confusion.   All other systems reviewed and are negative.       Home Medications    (Not in a hospital admission)  carvedilol (COREG) 6.25 MG tablet  12/5/2019  --  --     cetirizine (zyrTEC) 10 MG tablet  Past Week  --  --    lisinopril (PRINIVIL,ZESTRIL) 10 MG tablet  12/5/2019 08/27/19  --    Take 1 tablet by mouth Daily.    meloxicam (MOBIC) 15 MG tablet  Past Month  08/27/19  --    Take 1 tablet by mouth Daily As Needed for Mild Pain .    memantine (NAMENDA) 10 MG tablet  12/4/2019  --  --    nitroglycerin (NITROSTAT) 0.4 MG SL tablet  Unknown  10/01/19  --    DISSOLVE 1 TABLET UNDER THE TONGUE AS NEEDED FOR CHEST PAIN EVERY 5 MINUTES UP TO 3 TIMES. IF NO RELIEF CALL 911.    sertraline (ZOLOFT) 100 MG tablet  12/5/2019 08/27/19  --    Take 1 tablet by mouth Daily.    simvastatin (ZOCOR) 10 MG tablet  12/4/2019 08/27/19  --    Take 1 tablet by mouth Every Night.       History  Past Medical History:   Diagnosis Date   • Abnormal brain CT 6/2/2016    Overview:  Dilated lateral and 3rd ventricle. Possibly could be atrophy but cannot completely exclude hydrocephaly.   • Atrial fibrillation (CMS/HCC)    • Back pain    • Brain atrophy (CMS/HCC)    • CAD (coronary artery disease)    • Constipation    • Hyperlipidemia    • Hypersomnia    • Melanoma (CMS/HCC)     SCALP   • Memory deficit    • Neuropathy    • Radiculopathy      Past Surgical History:   Procedure Laterality Date   • AV NODE " "ABLATION     • CATARACT EXTRACTION     • COLONOSCOPY      <5years, clear   • CORONARY ANGIOPLASTY WITH STENT PLACEMENT      [place]   • PENILE PROSTHESIS IMPLANT     • SCALP/NECK TISSUE EXPANDER INSERTION N/A 9/11/2018    Procedure: Procedure performed:     1) Excision malignant neoplasm of skin of scalp, 6 cm x 5.5 cm, subfascial    2) full-thickness skin graft closure of 6 cm x 5.5 cm    3) left selective neck dissection (level IIA and IIB)    4) right sentinel lymph node biopsy.    5) complex closure skin of right neck, 10 cm ;  Surgeon: Eric Silva MD;  Location: [facility] OR;  Service: ENT     Family History   Problem Relation Age of Onset   • Cancer Mother    • Hypertension Mother      Social History     Tobacco Use   • Smoking status: Never Smoker   • Smokeless tobacco: Never Used   Substance Use Topics   • Alcohol use: No   • Drug use: No       (Not in a hospital admission)  Allergies:  Patient has no known allergies.  Attending physician personally reviewed the past medical history, social history, family history and performed the review of systems    Objective   ATTENDING PE  Vital Signs  Blood pressure (!) 197/116, pulse (!) 33, temperature 97.4 °F (36.3 °C), temperature source Oral, resp. rate 16, height 185.4 cm (73\"), weight 88.5 kg (195 lb), SpO2 95 %.    Physical Exam:  General Appearance:   Well-developed older gentleman in no distress   Head:   Normocephalic, atraumatic.  Crown of his head with a large surgical scar   Eyes:          Conjunctiva pink.  Pupils equal and reactive to light   Ears:     Throat:  Oral mucosa moist   Neck:  Trachea midline, no palpable thyroid   Back:      Lungs:    Symmetric chest expansion.  Breath sounds bilateral, equal, clear    Heart:   Slow rate, regular, S1, S2 auscultated   Abdomen:    Flat, bowel sounds present, soft, nontender   Rectal:     Deferred   Extremities:    No pitting edema or cyanosis, healing abrasion right lower extremity approximately 4cm  above " the ankle   Pulses:      Skin:  Warm and dry   Lymph nodes:  No cervical adenopathy   Neurologic:  Alert and cooperative.   symmetric       Results Review:   Lab Results (last 24 hours)     Procedure Component Value Units Date/Time    [place] Draw [[id number]] Collected:  12/05/19 1604    Specimen:  Blood Updated:  12/05/19 1715    Narrative:       The following orders were created for panel order [place] Draw.  Procedure                               Abnormality         Status                     ---------                               -----------         ------                     Light Blue Top[[id number]]                                   Final result               Green Top (Gel)[[id number]]                                  Final result               Lavender Top[[id number]]                                     Final result               Gold Top - SST[[id number]]                                   Final result               Green Top (No Gel)[[id number]]                                                            Please view results for these tests on the individual orders.    Light Blue Top [[id number]] Collected:  12/05/19 1604    Specimen:  Blood Updated:  12/05/19 1715     Extra Tube hold for add-on     Comment: Auto resulted       Green Top (Gel) [[id number]] Collected:  12/05/19 1604    Specimen:  Blood Updated:  12/05/19 1715     Extra Tube Hold for add-ons.     Comment: Auto resulted.       Lavender Top [[id number]] Collected:  12/05/19 1604    Specimen:  Blood Updated:  12/05/19 1715     Extra Tube hold for add-on     Comment: Auto resulted       Gold Top - SST [[id number]] Collected:  12/05/19 1604    Specimen:  Blood Updated:  12/05/19 1715     Extra Tube Hold for add-ons.     Comment: Auto resulted.       Comprehensive Metabolic Panel [[id number]]  (Abnormal) Collected:  12/05/19 1604    Specimen:  Blood Updated:  12/05/19 1712     Glucose 98 mg/dL      BUN 28 mg/dL      Creatinine 1.29 mg/dL      Sodium  141 mmol/L      Potassium 4.7 mmol/L      Comment: Specimen hemolyzed.  Results may be affected.        Chloride 105 mmol/L      CO2 24.0 mmol/L      Calcium 10.0 mg/dL      Total Protein 7.1 g/dL      Albumin 4.10 g/dL      ALT (SGPT) 12 U/L      Comment: Specimen hemolyzed.  Results may be affected.        AST (SGOT) 23 U/L      Comment: Specimen hemolyzed.  Results may be affected.        Alkaline Phosphatase 89 U/L      Total Bilirubin 0.4 mg/dL      eGFR Non African Amer 53 mL/min/1.73      Globulin 3.0 gm/dL      A/G Ratio 1.4 g/dL      BUN/Creatinine Ratio 21.7     Anion Gap 12.0 mmol/L     Narrative:       GFR Normal >60  Chronic Kidney Disease <60  Kidney Failure <15    Troponin [[id number]]  (Normal) Collected:  12/05/19 1604    Specimen:  Blood Updated:  12/05/19 1710     Troponin T 0.028 ng/mL     Narrative:       Troponin T Reference Range:  <= 0.03 ng/mL-   Negative for AMI  >0.03 ng/mL-     Abnormal for myocardial necrosis.  Clinicians would have to utilize clinical acumen, EKG, Troponin and serial changes to determine if it is an Acute Myocardial Infarction or myocardial injury due to an underlying chronic condition.     TSH [[id number]]  (Normal) Collected:  12/05/19 1604    Specimen:  Blood Updated:  12/05/19 1710     TSH 2.030 uIU/mL     BNP [[id number]]  (Normal) Collected:  12/05/19 1604    Specimen:  Blood Updated:  12/05/19 1710     proBNP 1,568.0 pg/mL     Narrative:       Among patients with dyspnea, NT-proBNP is highly sensitive for the detection of acute congestive heart failure. In addition NT-proBNP of <300 pg/ml effectively rules out acute congestive heart failure with 99% negative predictive value.    Magnesium [[id number]]  (Normal) Collected:  12/05/19 1604    Specimen:  Blood Updated:  12/05/19 1708     Magnesium 2.2 mg/dL     CBC & Differential [[id number]] Collected:  12/05/19 1604    Specimen:  Blood Updated:  12/05/19 1639    Narrative:       The following orders were created for  panel order CBC & Differential.  Procedure                               Abnormality         Status                     ---------                               -----------         ------                     CBC Auto Differential[[id number]]        Abnormal            Final result                 Please view results for these tests on the individual orders.    CBC Auto Differential [[id number]]  (Abnormal) Collected:  12/05/19 1604    Specimen:  Blood Updated:  12/05/19 1639     WBC 7.19 10*3/mm3      RBC 4.39 10*6/mm3      Hemoglobin 14.6 g/dL      Hematocrit 44.2 %      .7 fL      MCH 33.3 pg      MCHC 33.0 g/dL      RDW 12.2 %      RDW-SD 45.3 fl      MPV 9.9 fL      Platelets 154 10*3/mm3      Neutrophil % 54.2 %      Lymphocyte % 32.5 %      Monocyte % 9.7 %      Eosinophil % 2.6 %      Basophil % 0.6 %      Immature Grans % 0.4 %      Neutrophils, Absolute 3.89 10*3/mm3      Lymphocytes, Absolute 2.34 10*3/mm3      Monocytes, Absolute 0.70 10*3/mm3      Eosinophils, Absolute 0.19 10*3/mm3      Basophils, Absolute 0.04 10*3/mm3      Immature Grans, Absolute 0.03 10*3/mm3      nRBC 0.0 /100 WBC         Imaging Results (Last 24 Hours)     Procedure Component Value Units Date/Time    XR Chest 1 View [[id number]] Collected:  12/05/19 1610     Updated:  12/05/19 1700    Narrative:       EXAMINATION: XR CHEST 1 VW-12/05/2019:      INDICATION: Dysrhythmia, triage protocol.      COMPARISON: NONE.     FINDINGS: AP chest radiograph was performed in two acquisitions to image  the entire chest. Chronic changes in the lungs are identified. Aortic  arch calcifications are noted. There is mild prominence of aortic arch  noted. Likely calcified right mid lung granuloma noted. No evidence of  active airspace disease. The visualized upper abdomen is unrevealing. No  acute osseous abnormality identified.       Impression:       1.  No convincing evidence of active airspace disease in the setting of  chronic lung changes.  2.   There is mild prominence of the aortic arch suggested. Consider  follow-up CT imaging of the chest for further evaluation.     D:  12/05/2019  E:  12/05/2019     This report was finalized on 12/5/2019 4:57 PM by Dr. Abdulkadir Kuo MD.            I personally reviewed his chest x-ray and concur with some calcified granulomatous changes, no acute infiltrate edema or effusions.  Heart is mildly globular.  I also reviewed his lab and x-ray data    PROBLEM LIST  Patient Active Problem List   Diagnosis   • Balance problem   • Memory deficit   • BMI 25.0-25.9,adult   • Nonsmoker   • Recurrent malignant melanoma of skin (CMS/HCC)   • Atrial fibrillation not on anticoagulation    • Benign essential hypertension   • Benign prostatic hyperplasia   • Brain atrophy (CMS/HCC)   • CAD    • Chronic constipation   • Coronary arteriosclerosis in native artery   • Dyspnea   • Encounter for screening colonoscopy   • History of penile implant   • Hyperlipidemia   • Hypoesthesia of skin   • Idiopathic peripheral neuropathy   • Lumbar radiculopathy   • Malaise and fatigue   • Paroxysmal atrial fibrillation (CMS/HCC)   • Systolic heart failure (CMS/HCC)   • Weakness   • Symptomatic bradycardia   • Mobitz type 2 second degree AV block   • Alzheimer's dementia on memantine    • Acute renal insufficiency       Assessment/Plan   ATTENDING ASSESSMENT and PLAN    #1 profound bradycardia with a heart rate of 30 this is associated with dizziness and fatigue.  However he is actually hypertensive.  This could perhaps be from his Coreg, sick sinus syndrome, right coronary artery lesion.  TSH is low making hypothyroid unlikely.  Dr. Montiel was made aware of him by the emergency room physician.  He did not feel temporary pacemaker was required tonight.  Previous EKG in September revealed sinus rhythm with a rate of 55 and some PACs.  At night he has sinus bradycardia with a new right bundle branch block.  Rhythm strip however clearly shows that he  has nonconducted P waves.  Troponin was normal at 0.028.  He did receive glucagon and Zofran in the emergency room without benefit.    #2 acute renal insufficiency serum creatinine is 1.29 compared to 0.9 in June.  He does not take diuretics.  Potassium is normal at 4.7.  Calcium is not elevated.  He may be mildly volume depleted or it could be related to his ACE inhibitor    #3 hypertension currently elevated.  His blood pressure was 136/86 on November 22.  However, Coreg dose was decreased to 1/2 according to the note.    #4 history of coronary artery disease with previous stent done in [place].  Details are not available    #5 history of proximal atrial fibrillation undergoing AV node ablation x2 in [place] at [place]    #6 Alzheimer's dementia, late onset currently on  Memantine.  Problem is predominantly short-term memory and balance.    Monitor rhythm in the intensive care unit  Stop ACE inhibitor secondary to renal dysfunction  Isopril of rhythm becomes more difficult or an external pacemaker  Consult EP service  Use Norvasc for hypertension or Cardene drip  Gentle hydration  Echocardiogram to evaluate wall motion and ejection fraction and to look for valvular disease  Substitute Crestor for Zocor which is not on our formulary  Aspirin  Obviously hold beta-blockers  Subcutaneous heparin  Pepcid    Danielle Martinez, APRN, AGACN-BC, FNP-BC   Pulmonary and Critical Care     I interviewed the patient personally and face-to-face, reviewed his past medical history, performed the above physical examination, dictated the above assessment and plan and personally looked at his chest x-ray, EKGs, laboratory data.  RICH reviewed his history in the emergency room and amended the past medical history, but did not perform any face-to-face assessment.  He has a potentially life-threatening bradycardia arrhythmia.  He is at risk for asystole and needs careful monitoring in the ICU, correction of volume status.    Maribel KERR  MD Harde[facility]    Time: Critical care 45 min    This note was produced with a voice recognition program and may have uncorrected errors.       Electronically signed by Maribel Meier MD at 12/05/19 1915

## 2019-12-11 NOTE — TELEPHONE ENCOUNTER
Can you please confirm the number for hospice I can contact them.  The number that is listed states that is not a working number.

## 2019-12-11 NOTE — TELEPHONE ENCOUNTER
[facility] called and stated that patient is leaving [facility] today under Hospice care. Hospice would like to know if Dr. May will continue to treat patient.    Hospice call back  [phone]

## 2019-12-11 NOTE — DISCHARGE SUMMARY
"    [facility] Medicine Services  DISCHARGE SUMMARY    Patient Name: Ayan Pichardo  : 1932  MRN: [id number]    Date of Admission: 2019  3:51 PM  Date of Discharge:  2019  Primary Care Physician: Carlyle Coker MD    Consults     Date and Time Order Name Status Description    2019 1044 Inpatient Palliative Care MD Consult Completed     2019 1920 Inpatient Cardiology Consult Completed           Hospital Course     Presenting Problem:   Second degree type II atrioventricular block [I44.1]    Active Hospital Problems    Diagnosis  POA   • **2:1 complete AV block with associated traumatic bradycardia and near syncope (CMS/HCC) [I44.2]  Yes   • Placement of dual-chamber PPM 2019 [Z95.0]  No   • Alzheimer's dementia on memantine  [G30.9, F02.80]  Yes   • Acute renal insufficiency.  Resolving [N28.9]  Yes   • Essential hypertension [I10]  Yes   • PAF not on anticoagulation  [I48.91]  Yes   • CAD with history of JESSICA  [I25.10]  Yes   • Hyperlipidemia LDL goal <70 [E78.5]  Yes      Resolved Hospital Problems   No resolved problems to display.          Hospital Course:    per ICU history    Ayan Pichardo is a \"87-year-old white male lifelong non-smoker who lives in an assisted living facility due to Alzheimer's disease.  Patient has a history of CAD and previous stent, PAF not on anticoagulation, previous AV nella ablation, CAD, hypertension, and hyperlipidemia.  Apparently the patient was complaining of fatigue and was noted to have bradycardia down into the 40s the week PTA despite reducing his Coreg dose.  His physician insisted he go to the ER and he arrived at [facility] the afternoon of 2019.  Was noted to have alternating CHB and 2-1 AV conduction with RBBB and LAFB.  Heart rates were in the 30s but blood pressure in the ER was 229/100.  Cardiology recommended he be admitted to the ICU where he was begun on Isopril drip.  Because of the significant risk of " "cardiac syncope and subsequent falls it was decided to place a dual-chamber PPM and this was performed 12/6/2019. \"    Post-procedurally patient had issues with weakness, debility and sundowning. It was initially felt that patient may be unable to return home to his independent living given his confusion and weakness, however patient improved significantly 12/10-11 and was noted to be ambulating independently >200 ft with PT and daughters felt comfortable with transition home. Patient will have close follow up with PCP and cardiology.      Discharge Follow Up Recommendations for labs/diagnostics:  PCP in 1-2 weeks, need BP recheck  Cardiology in 1-2 weeks    Day of Discharge     HPI:   Doing well. Sitting up in chair in street clothes. Wants to go home. Daughters at bedside.     Review of Systems  Gen- No fevers, chills  CV- No chest pain, palpitations  Resp- No cough, dyspnea  GI- No N/V/D, abd pain      Otherwise ROS is negative except as mentioned in the HPI.    Vital Signs:   Temp:  [97.8 °F (36.6 °C)-98.2 °F (36.8 °C)] 97.8 °F (36.6 °C)  Heart Rate:  [74-89] 89  Resp:  [16-18] 18  BP: (131-182)/() 155/104     Physical Exam:  GEN- no acute distress noted, resting in chair, awake  HEENT- atraumatic, normocephlic, eomi  NECK- supple, trachea midline, no masses  RESP: ctab, normal effort  CV: no murmurs, s1/s2, rrr  MSK: no edema noted, spontaneous movement of all extremities  NEURO: alert, oriented, no focal deficits  SKIN: no rashes  PSYCH: appropriate mood and affect       Pertinent  and/or Most Recent Results     Results from last 7 days   Lab Units 12/09/19  2201 12/09/19  0509 12/07/19  0420 12/06/19  1201 12/06/19  0548 12/05/19  1604   WBC 10*3/mm3  --   --  9.54  --  9.59 7.19   HEMOGLOBIN g/dL  --   --  14.3  --  15.0 14.6   HEMATOCRIT %  --   --  46.4  --  47.6 44.2   PLATELETS 10*3/mm3  --   --  127*  --  150 154   SODIUM mmol/L  --  143 141 145  --  141   POTASSIUM mmol/L 4.5 3.2* 4.1 3.9  --  " 4.7   CHLORIDE mmol/L  --  109* 112* 110*  --  105   CO2 mmol/L  --  20.0* 16.0* 20.0*  --  24.0   BUN mg/dL  --  40* 29* 31*  --  28*   CREATININE mg/dL  --  1.13 1.34* 1.57*  --  1.29*   GLUCOSE mg/dL  --  102* 102* 101*  --  98   CALCIUM mg/dL  --  9.7 9.5 9.9  --  10.0     Results from last 7 days   Lab Units 12/06/19  0549 12/05/19  1604   BILIRUBIN mg/dL  --  0.4   ALK PHOS U/L  --  89   ALT (SGPT) U/L  --  12   AST (SGOT) U/L  --  23   PROTIME Seconds 14.0  --    INR  1.13  --            Invalid input(s): TG, LDLCALC, LDLREALC  Results from last 7 days   Lab Units 12/06/19  1201 12/05/19  1604   TSH uIU/mL  --  2.030   PROBNP pg/mL  --  1,568.0   TROPONIN T ng/mL 0.072* 0.028       Brief Urine Lab Results  (Last result in the past 365 days)      Color   Clarity   Blood   Leuk Est   Nitrite   Protein   CREAT   Urine HCG        05/15/19 1351 DK YELLOW Clear Negative Negative Negative                 Microbiology Results Abnormal     None          Imaging Results (All)     Procedure Component Value Units Date/Time    XR Chest PA & Lateral [[id number]] Collected:  12/07/19 1029     Updated:  12/07/19 2353    Narrative:          EXAMINATION: XR CHEST PA AND LATERAL - 12/07/2019     INDICATION: Evaluate pacemaker placement.     COMPARISON: 12/05/2019     FINDINGS: Left-sided dual-lead pacemaker has been placed. The heart is  normal in size. There is mild pulmonary vascular congestion and what  appears to be early interstitial edema. No lung consolidation, effusion  or pneumothorax is seen.       Impression:       1. Pacemaker placement with no evidence of pneumothorax.  2. Mild pulmonary vascular congestion.     DICTATED:   12/07/2019  EDITED/ls :   12/07/2019      This report was finalized on 12/7/2019 11:50 PM by DR. Randal Hu MD.       XR Chest 1 View [[id number]] Collected:  12/05/19 1610     Updated:  12/05/19 1700    Narrative:       EXAMINATION: XR CHEST 1 VW-12/05/2019:      INDICATION: Dysrhythmia, triage  protocol.      COMPARISON: NONE.     FINDINGS: AP chest radiograph was performed in two acquisitions to image  the entire chest. Chronic changes in the lungs are identified. Aortic  arch calcifications are noted. There is mild prominence of aortic arch  noted. Likely calcified right mid lung granuloma noted. No evidence of  active airspace disease. The visualized upper abdomen is unrevealing. No  acute osseous abnormality identified.       Impression:       1.  No convincing evidence of active airspace disease in the setting of  chronic lung changes.  2.  There is mild prominence of the aortic arch suggested. Consider  follow-up CT imaging of the chest for further evaluation.     D:  12/05/2019  E:  12/05/2019     This report was finalized on 12/5/2019 4:57 PM by Dr. Abdulkadir Kuo MD.                       Results for orders placed during the hospital encounter of 12/05/19   Adult Transthoracic Echo Limited W/ Cont if Necessary Per Protocol    Narrative · Estimated EF = 55%.  · Left ventricular systolic function is normal.  · Aortic valve maximum pressure gradient is 13.2 mmHg.  · Calculated right ventricular systolic pressure from tricuspid   regurgitation is 23 mmHg.            Discharge Details        Discharge Medications      New Medications      Instructions Start Date   famotidine 20 MG tablet  Commonly known as:  PEPCID   20 mg, Oral, 2 Times Daily Before Meals      ibuprofen 400 MG tablet  Commonly known as:  ADVIL,MOTRIN   400 mg, Oral, Every 8 Hours PRN      rosuvastatin 10 MG tablet  Commonly known as:  CRESTOR   10 mg, Oral, Nightly         Changes to Medications      Instructions Start Date   carvedilol 12.5 MG tablet  Commonly known as:  COREG  What changed:    · medication strength  · how much to take   12.5 mg, Oral, 2 Times Daily With Meals      memantine 10 MG tablet  Commonly known as:  NAMENDA  What changed:  when to take this   10 mg, Oral, Nightly      nitroglycerin 0.4 MG SL  tablet  Commonly known as:  NITROSTAT  What changed:  See the new instructions.   0.4 mg, Sublingual, Every 5 Minutes PRN, Take no more than 3 doses in 15 minutes.      sertraline 50 MG tablet  Commonly known as:  ZOLOFT  What changed:    · medication strength  · how much to take  · when to take this   50 mg, Oral, Nightly         Continue These Medications      Instructions Start Date   cetirizine 10 MG tablet  Commonly known as:  zyrTEC   10 mg, Oral, Daily      lisinopril 10 MG tablet  Commonly known as:  PRINIVIL,ZESTRIL   10 mg, Oral, Daily         Stop These Medications    meloxicam 15 MG tablet  Commonly known as:  MOBIC     simvastatin 10 MG tablet  Commonly known as:  ZOCOR            No Known Allergies      Discharge Disposition:  [facility]    Diet:  Hospital:  Diet Order   Procedures   • Diet Regular       Activity:  As tolerated    Restrictions or Other Recommendations:  None       CODE STATUS:    Code Status and Medical Interventions:   Ordered at: 12/09/19 1041     Limited Support to NOT Include:    Artificial Nutrition    Blood Products    Cardioversion/Defibrillation    Dialysis    Intubation    NIPPV (Non-Invasive Positive Pressure Support)    Vasopressors    Antiarrhythmic Drugs     Level Of Support Discussed With:    Next of Kin (If No Surrogate)     Code Status:    No CPR     Medical Interventions (Level of Support Prior to Arrest):    Limited       Future Appointments   Date Time Provider Department Center   12/18/2019 11:45 AM WOUND CHECK [facility] None   12/20/2019  3:15 PM Isma May MD [facility] None   2/19/2020 10:00 AM Abdulkadir Goodson MD [facility] N [facility] [place] None   2/27/2020 10:30 AM Isma May MD [facility] None   3/23/2020 11:45 AM Leighton Granda DO [facility] None       Additional Instructions for the Follow-ups that You Need to Schedule     Ambulatory Referral to Home Health   As directed      Face to Face Visit Date:  12/11/2019    Follow-up provider  for Plan of Care?:  I treated the patient in an acute care facility and will not continue treatment after discharge.    Follow-up provider:  VIELKA MACK [[id number]]    Reason/Clinical Findings:  2:1 complete AV block with associated traumatic bradycardia and near syncope, Placement of dual-chamber PPM 12/6/2019    Describe mobility limitations that make leaving home difficult:  dementia, impaired physical mobility and gait    Nursing/Therapeutic Services Requested:  Skilled Nursing Physical Therapy Occupational Therapy    Skilled nursing orders:  Medication education Cardiopulmonary assessments    PT orders:  Gait Training Strengthening Home safety assessment    Weight Bearing Status:  As Tolerated    Occupational orders:  Activities of daily living Energy conservation Strengthening Cognition    Frequency:  1 Week 1         Discharge Follow-up with PCP   As directed       Currently Documented PCP:    Vielka Mack MD    PCP Phone Number:    [phone]     Follow Up Details:  1-2 weeks, need to recheck BP         Discharge Follow-up with Specified Provider: Cardiology 1-2 weeks   As directed      To:  Cardiology 1-2 weeks               Time Spent on Discharge:  35 minutes    Electronically signed by Isabel Wyatt MD, 12/11/19, 9:51 AM.

## 2019-12-11 NOTE — PROGRESS NOTES
Case Management Discharge Note      Final Note: I met with Mr. Pichardo and his daughter Maria C at the bedside. They have decided that they would like Mr. Pichardo to return home to his independent living apartmart. Yesterday he ambulated >200 feet with  minimum assist and this is appropriate. They would like home health services to continue therapy with him at home. [facility] has a contract with [facility]. I called the referral to them and faxed orders to [phone]. They will follow Mr. Pichardo for SN, PT, and OT. They are requesting a sitter services list to potentially hire someone to assist him and his wife at home. I provided this to them as well. They are requesting a wheelchair for Mr. Pichardo to use when he leaves the apartment. Per Medicare guidelines, he does not qualify for this but they are interseted in private paying. They are requesting to purchase this from [employer]. I contacted Vivek with [employer]. She is going to meet with them at the bedside this morning to discuss options with them. Maria C will transport Mr. Pichardo home by car. She denies having any additional discharge needs.         Destination      No service has been selected for the patient.      Durable Medical Equipment      Service Provider Request Status Selected Services Address Phone Number Fax Number    [facility] [place] Selected Durable Medical Equipment [address] [address] [phone]      Dialysis/Infusion      No service has been selected for the patient.      Home Medical Care - Selection Complete      Service Provider Request Status Selected Services Address Phone Number Fax Number    [facility] Selected Home Health Services [address] [facility] [address] [address] [phone]      Therapy      No service has been selected for the patient.      Community Resources      No service has been selected for the patient.              Final Discharge Disposition Code: 06 - home with home health care

## 2019-12-11 NOTE — TELEPHONE ENCOUNTER
Isadora with [facility] is wanting to know if Dr. May is wanting to know if he is willing to sign off for home health for pt.  Isadora is requesting call back.

## 2019-12-12 ENCOUNTER — READMISSION MANAGEMENT (OUTPATIENT)
Dept: CALL CENTER | Facility: HOSPITAL | Age: 84
End: 2019-12-12

## 2019-12-12 ENCOUNTER — TRANSITIONAL CARE MANAGEMENT TELEPHONE ENCOUNTER (OUTPATIENT)
Dept: CALL CENTER | Facility: HOSPITAL | Age: 84
End: 2019-12-12

## 2019-12-12 NOTE — OUTREACH NOTE
Spoke with daughter Maria C.  She says that patient is doing well and is only having some minor pain to his incisional site from his pacemaker placed.  They are trying to get Hospice involved and set up.   Daughter says that they received discharge instructions and were able to get all prescribed medications.  Reviewed follow up appts, it was noted that patient has an appt with cardiology on 12/18/19 at 11:45 am and with Dr. May on 12/20/19 at 3:15 pm.  Daughter states that they have no questions or concerns at this time.

## 2019-12-12 NOTE — OUTREACH NOTE
Prep Survey      Responses   Facility patient discharged from?  [place]   Is patient eligible?  Yes   Discharge diagnosis  heart block with syncope, Pacemaker placed   Does the patient have one of the following disease processes/diagnoses(primary or secondary)?  General Surgery   Does the patient have Home health ordered?  Yes   What is the Home health agency?   [facility]   Is there a DME ordered?  Yes   What DME was ordered?  W/C requested - priviate pay - met with [place]   General alerts for this patient  Independent Living apartment   Prep survey completed?  Yes          Charo Sánchez RN

## 2019-12-13 ENCOUNTER — READMISSION MANAGEMENT (OUTPATIENT)
Dept: CALL CENTER | Facility: HOSPITAL | Age: 84
End: 2019-12-13

## 2019-12-13 RX ORDER — CARVEDILOL 12.5 MG/1
12.5 TABLET ORAL 2 TIMES DAILY WITH MEALS
Qty: 180 TABLET | Refills: 2 | Status: SHIPPED | OUTPATIENT
Start: 2019-12-13 | End: 2020-01-01 | Stop reason: ALTCHOICE

## 2019-12-13 NOTE — TELEPHONE ENCOUNTER
Isadora with [facility] is calling back. She is requesting verbal orders for home health.  She can be reached at [phone].

## 2019-12-13 NOTE — OUTREACH NOTE
General Surgery Week 1 Survey      Responses   Facility patient discharged from?  [place]   Does the patient have one of the following disease processes/diagnoses(primary or secondary)?  General Surgery   Is there a successful TCM telephone encounter documented?  Yes          Daniela Sauceda RN

## 2019-12-20 ENCOUNTER — READMISSION MANAGEMENT (OUTPATIENT)
Dept: CALL CENTER | Facility: HOSPITAL | Age: 84
End: 2019-12-20

## 2019-12-20 NOTE — OUTREACH NOTE
General Surgery Week 2 Survey      Responses   Facility patient discharged from?  [place]   Does the patient have one of the following disease processes/diagnoses(primary or secondary)?  General Surgery   Week 2 attempt successful?  Yes   Call start time  1320   Call end time  1326   Discharge diagnosis  heart block with syncope, Pacemaker placed   Is patient permission given to speak with other caregiver?  Yes   List who call center can speak with  Dea-spouse   Person spoke with today (if not patient) and relationship  Pt and Dea   Meds reviewed with patient/caregiver?  Yes   Is the patient having any side effects they believe may be caused by any medication additions or changes?  No   Does the patient have all medications related to this admission filled (includes all antibiotics, pain medications, etc.)  Yes   Is the patient taking all medications as directed (includes completed medication regime)?  Yes   Does the patient have a follow up appointment scheduled with their surgeon?  No   What is preventing the patient from scheduling follow up appointments?  Unsure of when or with whom   Nursing Interventions  Educated patient on importance of making appointment   Has the patient kept scheduled appointments due by today?  N/A   What is the Home health agency?   [facility]    Has home health visited the patient within 72 hours of discharge?  Unsure   What DME was ordered?  [facility] met with [place]   Has all DME been delivered?  Yes   Psychosocial issues?  No   Did the patient receive a copy of their discharge instructions?  Yes   Nursing interventions  Reviewed instructions with patient   What is the patient's perception of their health status since discharge?  Improving   Nursing interventions  Nurse provided patient education   Is the patient /caregiver able to teach back basic post-op care?  No tub bath, swimming, or hot tub until instructed by MD, Take showers only when approved by  MD-sponge bathe until then, Lifting as instructed by MD in discharge instructions   Is the patient/caregiver able to teach back signs and symptoms of incisional infection?  Fever, Increased drainage or bleeding, Increased redness, swelling or pain at the incisonal site   Is the patient/caregiver able to teach back steps to recovery at home?  Set small, achievable goals for return to baseline health, Rest and rebuild strength, gradually increase activity   Is the patient/caregiver able to teach back the hierarchy of who to call/visit for symptoms/problems? PCP, Specialist, Home health nurse, Urgent Care, ED, 911  Yes   Week 2 call completed?  Yes   Wrap up additional comments  Family has 24 hour sitters since surgery that they will D/C tonight r/t him doing so well.           Rosemarie Padilla, RN

## 2019-12-27 ENCOUNTER — READMISSION MANAGEMENT (OUTPATIENT)
Dept: CALL CENTER | Facility: HOSPITAL | Age: 84
End: 2019-12-27

## 2019-12-27 NOTE — OUTREACH NOTE
General Surgery Week 3 Survey      Responses   Facility patient discharged from?  [place]   Does the patient have one of the following disease processes/diagnoses(primary or secondary)?  General Surgery   Week 3 attempt successful?  Yes   Call start time  1225   Revoke  Change in health status-moved to LTC/SNF/Hospice [Hospice r/t dementia]   Call end time  1226   General alerts for this patient  Independent Living apartment   Discharge diagnosis  heart block with syncope, Pacemaker placed   Person spoke with today (if not patient) and relationship  Deep-daughter   Meds reviewed with patient/caregiver?  Yes   Is the patient taking all medications as directed (includes completed medication regime)?  Yes          Rosemarie Padilla RN

## 2020-01-01 ENCOUNTER — APPOINTMENT (OUTPATIENT)
Dept: CT IMAGING | Facility: HOSPITAL | Age: 85
End: 2020-01-01

## 2020-01-01 ENCOUNTER — LAB (OUTPATIENT)
Dept: LAB | Facility: HOSPITAL | Age: 85
End: 2020-01-01

## 2020-01-01 ENCOUNTER — TELEMEDICINE (OUTPATIENT)
Dept: CARDIOLOGY | Facility: CLINIC | Age: 85
End: 2020-01-01

## 2020-01-01 ENCOUNTER — TELEPHONE (OUTPATIENT)
Dept: CARDIOLOGY | Facility: CLINIC | Age: 85
End: 2020-01-01

## 2020-01-01 ENCOUNTER — OFFICE VISIT (OUTPATIENT)
Dept: NEUROLOGY | Facility: CLINIC | Age: 85
End: 2020-01-01

## 2020-01-01 ENCOUNTER — OFFICE VISIT (OUTPATIENT)
Dept: CARDIOLOGY | Facility: CLINIC | Age: 85
End: 2020-01-01

## 2020-01-01 ENCOUNTER — TELEPHONE (OUTPATIENT)
Dept: NEUROLOGY | Facility: CLINIC | Age: 85
End: 2020-01-01

## 2020-01-01 ENCOUNTER — APPOINTMENT (OUTPATIENT)
Dept: GENERAL RADIOLOGY | Facility: HOSPITAL | Age: 85
End: 2020-01-01

## 2020-01-01 ENCOUNTER — HOSPITAL ENCOUNTER (EMERGENCY)
Facility: HOSPITAL | Age: 85
Discharge: HOME | End: 2020-08-17
Attending: EMERGENCY MEDICINE | Admitting: EMERGENCY MEDICINE

## 2020-01-01 ENCOUNTER — CLINICAL SUPPORT NO REQUIREMENTS (OUTPATIENT)
Dept: CARDIOLOGY | Facility: CLINIC | Age: 85
End: 2020-01-01

## 2020-01-01 VITALS
WEIGHT: 190 LBS | SYSTOLIC BLOOD PRESSURE: 120 MMHG | BODY MASS INDEX: 24.38 KG/M2 | HEIGHT: 74 IN | DIASTOLIC BLOOD PRESSURE: 88 MMHG | OXYGEN SATURATION: 94 % | TEMPERATURE: 98.3 F | RESPIRATION RATE: 20 BRPM | HEART RATE: 125 BPM

## 2020-01-01 VITALS — WEIGHT: 195 LBS | OXYGEN SATURATION: 96 % | HEART RATE: 66 BPM | HEIGHT: 74 IN | BODY MASS INDEX: 25.03 KG/M2

## 2020-01-01 VITALS
HEART RATE: 96 BPM | DIASTOLIC BLOOD PRESSURE: 64 MMHG | HEIGHT: 74 IN | SYSTOLIC BLOOD PRESSURE: 110 MMHG | BODY MASS INDEX: 24.49 KG/M2 | OXYGEN SATURATION: 96 % | TEMPERATURE: 97.7 F | WEIGHT: 190.8 LBS

## 2020-01-01 VITALS
SYSTOLIC BLOOD PRESSURE: 130 MMHG | HEIGHT: 74 IN | BODY MASS INDEX: 23.74 KG/M2 | OXYGEN SATURATION: 96 % | TEMPERATURE: 97.9 F | HEART RATE: 72 BPM | WEIGHT: 185 LBS | DIASTOLIC BLOOD PRESSURE: 71 MMHG

## 2020-01-01 DIAGNOSIS — I44.2 COMPLETE AV BLOCK (HCC): ICD-10-CM

## 2020-01-01 DIAGNOSIS — F03.90 DEMENTIA WITHOUT BEHAVIORAL DISTURBANCE, UNSPECIFIED DEMENTIA TYPE: ICD-10-CM

## 2020-01-01 DIAGNOSIS — I48.0 PAROXYSMAL ATRIAL FIBRILLATION (HCC): Primary | ICD-10-CM

## 2020-01-01 DIAGNOSIS — Z95.0 PRESENCE OF PERMANENT CARDIAC PACEMAKER: ICD-10-CM

## 2020-01-01 DIAGNOSIS — I45.9 HEART BLOCK: ICD-10-CM

## 2020-01-01 DIAGNOSIS — G30.1 LATE ONSET ALZHEIMER'S DISEASE WITHOUT BEHAVIORAL DISTURBANCE (HCC): Primary | ICD-10-CM

## 2020-01-01 DIAGNOSIS — R00.1 SYMPTOMATIC BRADYCARDIA: Primary | ICD-10-CM

## 2020-01-01 DIAGNOSIS — R00.0 RAPID HEART RATE: ICD-10-CM

## 2020-01-01 DIAGNOSIS — I48.0 PAROXYSMAL ATRIAL FIBRILLATION (HCC): ICD-10-CM

## 2020-01-01 DIAGNOSIS — R00.1 SYMPTOMATIC BRADYCARDIA: ICD-10-CM

## 2020-01-01 DIAGNOSIS — N39.0 URINARY TRACT INFECTION WITH HEMATURIA, SITE UNSPECIFIED: ICD-10-CM

## 2020-01-01 DIAGNOSIS — R31.9 URINARY TRACT INFECTION WITH HEMATURIA, SITE UNSPECIFIED: ICD-10-CM
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
## 2020-01-01 DIAGNOSIS — R10.2 PELVIC PAIN: ICD-10-CM

## 2020-01-01 DIAGNOSIS — M54.16 LUMBAR RADICULOPATHY: ICD-10-CM

## 2020-01-01 DIAGNOSIS — J32.0 CHRONIC MAXILLARY SINUSITIS: ICD-10-CM

## 2020-01-01 DIAGNOSIS — Z95.0 CARDIAC PACEMAKER IN SITU: ICD-10-CM

## 2020-01-01 DIAGNOSIS — D72.829 LEUKOCYTOSIS, UNSPECIFIED TYPE: ICD-10-CM

## 2020-01-01 DIAGNOSIS — F02.80 LATE ONSET ALZHEIMER'S DISEASE WITHOUT BEHAVIORAL DISTURBANCE (HCC): Primary | ICD-10-CM

## 2020-01-01 LAB
ALBUMIN SERPL-MCNC: 3.3 G/DL (ref 3.5–5.2)
ALBUMIN SERPL-MCNC: 3.8 G/DL (ref 3.5–5.2)
ALBUMIN/GLOB SERPL: 0.9 G/DL
ALBUMIN/GLOB SERPL: 1.1 G/DL
ALP SERPL-CCNC: 75 U/L (ref 39–117)
ALP SERPL-CCNC: 90 U/L (ref 39–117)
ALT SERPL W P-5'-P-CCNC: 17 U/L (ref 1–41)
ALT SERPL W P-5'-P-CCNC: 20 U/L (ref 1–41)
ANION GAP SERPL CALCULATED.3IONS-SCNC: 10 MMOL/L (ref 5–15)
ANION GAP SERPL CALCULATED.3IONS-SCNC: 9.7 MMOL/L (ref 5–15)
AST SERPL-CCNC: 21 U/L (ref 1–40)
AST SERPL-CCNC: 23 U/L (ref 1–40)
BACTERIA SPEC AEROBE CULT: ABNORMAL
BACTERIA UR QL AUTO: ABNORMAL /HPF
BASOPHILS # BLD MANUAL: 0 10*3/MM3 (ref 0–0.2)
BASOPHILS NFR BLD AUTO: 0 % (ref 0–1.5)
BILIRUB SERPL-MCNC: 0.3 MG/DL (ref 0–1.2)
BILIRUB SERPL-MCNC: 0.9 MG/DL (ref 0–1.2)
BILIRUB UR QL STRIP: NEGATIVE
BUN SERPL-MCNC: 20 MG/DL (ref 8–23)
BUN SERPL-MCNC: 21 MG/DL (ref 8–23)
BUN/CREAT SERPL: 16.4 (ref 7–25)
BUN/CREAT SERPL: 16.7 (ref 7–25)
CALCIUM SPEC-SCNC: 9.6 MG/DL (ref 8.6–10.5)
CALCIUM SPEC-SCNC: 9.8 MG/DL (ref 8.6–10.5)
CHLORIDE SERPL-SCNC: 102 MMOL/L (ref 98–107)
CHLORIDE SERPL-SCNC: 106 MMOL/L (ref 98–107)
CLARITY UR: ABNORMAL
CO2 SERPL-SCNC: 25.3 MMOL/L (ref 22–29)
CO2 SERPL-SCNC: 28 MMOL/L (ref 22–29)
COLOR UR: YELLOW
CREAT SERPL-MCNC: 1.2 MG/DL (ref 0.76–1.27)
CREAT SERPL-MCNC: 1.28 MG/DL (ref 0.76–1.27)
DEPRECATED RDW RBC AUTO: 49.1 FL (ref 37–54)
EOSINOPHIL # BLD MANUAL: 0 10*3/MM3 (ref 0–0.4)
EOSINOPHIL NFR BLD MANUAL: 0 % (ref 0.3–6.2)
ERYTHROCYTE [DISTWIDTH] IN BLOOD BY AUTOMATED COUNT: 13.5 % (ref 12.3–15.4)
GFR SERPL CREATININE-BSD FRML MDRD: 53 ML/MIN/1.73
GFR SERPL CREATININE-BSD FRML MDRD: 57 ML/MIN/1.73
GLOBULIN UR ELPH-MCNC: 3.5 GM/DL
GLOBULIN UR ELPH-MCNC: 3.8 GM/DL
GLUCOSE SERPL-MCNC: 116 MG/DL (ref 65–99)
GLUCOSE SERPL-MCNC: 141 MG/DL (ref 65–99)
GLUCOSE UR STRIP-MCNC: NEGATIVE MG/DL
HCT VFR BLD AUTO: 49 % (ref 37.5–51)
HGB BLD-MCNC: 15.6 G/DL (ref 13–17.7)
HGB UR QL STRIP.AUTO: ABNORMAL
HYALINE CASTS UR QL AUTO: ABNORMAL /LPF
KETONES UR QL STRIP: NEGATIVE
LEUKOCYTE ESTERASE UR QL STRIP.AUTO: ABNORMAL
LYMPHOCYTES # BLD MANUAL: 0.75 10*3/MM3 (ref 0.7–3.1)
LYMPHOCYTES NFR BLD MANUAL: 4 % (ref 19.6–45.3)
LYMPHOCYTES NFR BLD MANUAL: 7 % (ref 5–12)
MAGNESIUM SERPL-MCNC: 1.9 MG/DL (ref 1.6–2.4)
MCH RBC QN AUTO: 31.6 PG (ref 26.6–33)
MCHC RBC AUTO-ENTMCNC: 31.8 G/DL (ref 31.5–35.7)
MCV RBC AUTO: 99.2 FL (ref 79–97)
MONOCYTES # BLD AUTO: 1.31 10*3/MM3 (ref 0.1–0.9)
NEUTROPHILS # BLD AUTO: 16.71 10*3/MM3 (ref 1.7–7)
NEUTROPHILS NFR BLD MANUAL: 80 % (ref 42.7–76)
NEUTS BAND NFR BLD MANUAL: 9 % (ref 0–5)
NITRITE UR QL STRIP: POSITIVE
PH UR STRIP.AUTO: <=5 [PH] (ref 5–8)
PLAT MORPH BLD: NORMAL
PLATELET # BLD AUTO: 137 10*3/MM3 (ref 140–450)
PMV BLD AUTO: 9.9 FL (ref 6–12)
POTASSIUM SERPL-SCNC: 3.7 MMOL/L (ref 3.5–5.2)
POTASSIUM SERPL-SCNC: 4.5 MMOL/L (ref 3.5–5.2)
PROT SERPL-MCNC: 7.1 G/DL (ref 6–8.5)
PROT SERPL-MCNC: 7.3 G/DL (ref 6–8.5)
PROT UR QL STRIP: ABNORMAL
RBC # BLD AUTO: 4.94 10*6/MM3 (ref 4.14–5.8)
RBC # UR: ABNORMAL /HPF
RBC MORPH BLD: NORMAL
REF LAB TEST METHOD: ABNORMAL
SODIUM SERPL-SCNC: 140 MMOL/L (ref 136–145)
SODIUM SERPL-SCNC: 141 MMOL/L (ref 136–145)
SP GR UR STRIP: 1.02 (ref 1–1.03)
SQUAMOUS #/AREA URNS HPF: ABNORMAL /HPF
T-UPTAKE NFR SERPL: 0.78 TBI (ref 0.8–1.3)
T4 SERPL-MCNC: 5.6 MCG/DL (ref 4.5–11.7)
TSH SERPL DL<=0.05 MIU/L-ACNC: 2.08 UIU/ML (ref 0.27–4.2)
TSH SERPL DL<=0.05 MIU/L-ACNC: 2.51 UIU/ML (ref 0.27–4.2)
UROBILINOGEN UR QL STRIP: ABNORMAL
WBC # BLD AUTO: 18.78 10*3/MM3 (ref 3.4–10.8)
WBC MORPH BLD: NORMAL
WBC UR QL AUTO: ABNORMAL /HPF

## 2020-01-01 PROCEDURE — 84479 ASSAY OF THYROID (T3 OR T4): CPT

## 2020-01-01 PROCEDURE — 93005 ELECTROCARDIOGRAM TRACING: CPT | Performed by: EMERGENCY MEDICINE

## 2020-01-01 PROCEDURE — 72192 CT PELVIS W/O DYE: CPT

## 2020-01-01 PROCEDURE — 83735 ASSAY OF MAGNESIUM: CPT | Performed by: EMERGENCY MEDICINE

## 2020-01-01 PROCEDURE — 84436 ASSAY OF TOTAL THYROXINE: CPT

## 2020-01-01 PROCEDURE — 84443 ASSAY THYROID STIM HORMONE: CPT

## 2020-01-01 PROCEDURE — 96365 THER/PROPH/DIAG IV INF INIT: CPT

## 2020-01-01 PROCEDURE — 85025 COMPLETE CBC W/AUTO DIFF WBC: CPT | Performed by: EMERGENCY MEDICINE

## 2020-01-01 PROCEDURE — 96375 TX/PRO/DX INJ NEW DRUG ADDON: CPT

## 2020-01-01 PROCEDURE — 71045 X-RAY EXAM CHEST 1 VIEW: CPT

## 2020-01-01 PROCEDURE — 84443 ASSAY THYROID STIM HORMONE: CPT | Performed by: EMERGENCY MEDICINE

## 2020-01-01 PROCEDURE — 36415 COLL VENOUS BLD VENIPUNCTURE: CPT

## 2020-01-01 PROCEDURE — 99442 PR PHYS/QHP TELEPHONE EVALUATION 11-20 MIN: CPT | Performed by: INTERNAL MEDICINE

## 2020-01-01 PROCEDURE — 70450 CT HEAD/BRAIN W/O DYE: CPT

## 2020-01-01 PROCEDURE — 99214 OFFICE O/P EST MOD 30 MIN: CPT | Performed by: PHYSICIAN ASSISTANT

## 2020-01-01 PROCEDURE — 80053 COMPREHEN METABOLIC PANEL: CPT | Performed by: EMERGENCY MEDICINE

## 2020-01-01 PROCEDURE — 93280 PM DEVICE PROGR EVAL DUAL: CPT | Performed by: PHYSICIAN ASSISTANT

## 2020-01-01 PROCEDURE — 87147 CULTURE TYPE IMMUNOLOGIC: CPT | Performed by: EMERGENCY MEDICINE

## 2020-01-01 PROCEDURE — 99214 OFFICE O/P EST MOD 30 MIN: CPT | Performed by: PSYCHIATRY & NEUROLOGY

## 2020-01-01 PROCEDURE — 87086 URINE CULTURE/COLONY COUNT: CPT | Performed by: EMERGENCY MEDICINE

## 2020-01-01 PROCEDURE — 80053 COMPREHEN METABOLIC PANEL: CPT

## 2020-01-01 PROCEDURE — 99285 EMERGENCY DEPT VISIT HI MDM: CPT

## 2020-01-01 PROCEDURE — 25010000002 CEFTRIAXONE PER 250 MG: Performed by: EMERGENCY MEDICINE

## 2020-01-01 PROCEDURE — 81001 URINALYSIS AUTO W/SCOPE: CPT | Performed by: EMERGENCY MEDICINE

## 2020-01-01 PROCEDURE — 85007 BL SMEAR W/DIFF WBC COUNT: CPT | Performed by: EMERGENCY MEDICINE

## 2020-01-01 RX ORDER — DONEPEZIL HYDROCHLORIDE 5 MG/1
5 TABLET, FILM COATED ORAL DAILY
Qty: 30 TABLET | Refills: 11 | Status: SHIPPED | OUTPATIENT
Start: 2020-01-01 | End: 2021-02-18

## 2020-01-01 RX ORDER — METOPROLOL TARTRATE 50 MG/1
50 TABLET, FILM COATED ORAL 2 TIMES DAILY
Qty: 180 TABLET | Refills: 3 | Status: SHIPPED | OUTPATIENT
Start: 2020-01-01

## 2020-01-01 RX ORDER — MELOXICAM 15 MG/1
15 TABLET ORAL DAILY PRN
Qty: 30 TABLET | Refills: 10 | Status: SHIPPED | OUTPATIENT
Start: 2020-01-01 | End: 2020-01-01

## 2020-01-01 RX ORDER — ROSUVASTATIN CALCIUM 10 MG/1
TABLET, COATED ORAL
Qty: 30 TABLET | Refills: 10 | Status: SHIPPED | OUTPATIENT
Start: 2020-01-01 | End: 2020-01-01

## 2020-01-01 RX ORDER — HALOPERIDOL 0.5 MG/1
TABLET ORAL AS NEEDED
COMMUNITY
Start: 2020-01-01

## 2020-01-01 RX ORDER — IBUPROFEN 400 MG/1
400 TABLET ORAL EVERY 6 HOURS PRN
COMMUNITY

## 2020-01-01 RX ORDER — ACETAMINOPHEN 325 MG/1
650 TABLET ORAL EVERY 4 HOURS PRN
COMMUNITY

## 2020-01-01 RX ORDER — METOPROLOL TARTRATE 5 MG/5ML
2.5 INJECTION INTRAVENOUS ONCE
Status: COMPLETED | OUTPATIENT
Start: 2020-01-01 | End: 2020-01-01

## 2020-01-01 RX ORDER — DOCUSATE SODIUM 100 MG/1
100 CAPSULE, LIQUID FILLED ORAL AS NEEDED
COMMUNITY

## 2020-01-01 RX ORDER — MELOXICAM 15 MG/1
15 TABLET ORAL DAILY
Qty: 30 TABLET | Refills: 1 | OUTPATIENT
Start: 2020-01-01

## 2020-01-01 RX ORDER — AMIODARONE HYDROCHLORIDE 200 MG/1
200 TABLET ORAL 2 TIMES DAILY
Qty: 60 TABLET | Refills: 3 | Status: SHIPPED | OUTPATIENT
Start: 2020-01-01

## 2020-01-01 RX ORDER — SERTRALINE HYDROCHLORIDE 100 MG/1
50 TABLET, FILM COATED ORAL DAILY
COMMUNITY
Start: 2020-01-01

## 2020-01-01 RX ORDER — CEFDINIR 300 MG/1
300 CAPSULE ORAL 2 TIMES DAILY
Qty: 14 CAPSULE | Refills: 0 | Status: SHIPPED | OUTPATIENT
Start: 2020-01-01 | End: 2020-01-01

## 2020-01-01 RX ADMIN — METOPROLOL TARTRATE 2.5 MG: 5 INJECTION INTRAVENOUS at 12:13

## 2020-01-01 RX ADMIN — CEFTRIAXONE SODIUM 1 G: 1 INJECTION, POWDER, FOR SOLUTION INTRAMUSCULAR; INTRAVENOUS at 14:24

## 2020-01-10 NOTE — TELEPHONE ENCOUNTER
This medication was not prescribed to the patient by me and I cannot find in the chart who prescribed it or when.

## 2020-02-19 PROBLEM — G30.1 LATE ONSET ALZHEIMER'S DISEASE WITHOUT BEHAVIORAL DISTURBANCE (HCC): Status: ACTIVE | Noted: 2019-12-05

## 2020-02-19 NOTE — PROGRESS NOTES
"Subjective     Chief Complaint: memory loss      History of Present Illness   Ayan Pichardo is a 87 y.o. male who returns to clinic today with a history of Alzheimer's Disease. His family has noted symptoms since at least 2016 marked initially by forgetfulness. This has gradually worsened  over time. Additional symptoms have included impairments in orientation  and executive function. There have been associated  symptoms of depression. He is currently residing at the [facility] with his wife.     He has also noted balance impairment for several years. He has had several falls, though fortunately without significant injury. He is scheduled to start PT in 10/19.     Prior evaluation has included a head CT and screening bloodwork which were unremarkable.    Since his last visit on 9/24/19 he has undergone a pacemaker implantation in 12/19. Otherwise, he has done well, and cognitively his family feels he may have improved since moving into the [facility]. Reports from the [facility] indicate essentially no change since his last visit.      I have reviewed and confirmed the past family, social and medical history as accurate on 2/19/20.     Review of Systems   Constitutional: Negative.        Objective     Pulse 66   Ht 188 cm (74\")   Wt 88.5 kg (195 lb)   SpO2 96%   BMI 25.04 kg/m²      General appearance today is normal.   Peripheral pulses were present and symmetric.  The ophthalmoscopic exam today is unremarkable. The discs and posterior elements are unremarkable.      Physical Exam   Neurological: He has normal strength. He has a normal Finger-Nose-Finger Test.   Psychiatric: His speech is normal.        Neurologic Exam     Mental Status   Oriented to person.   Disoriented to place.   Disoriented to time.   Registration: recalls 3 of 3 objects. Recall of objects at 5 minutes: 0/3 objects. Follows 3 step commands.   Attention: normal.   Speech: speech is normal   Level of consciousness: alert  Able to name " object. Able to read. Able to repeat. Able to write. Normal comprehension.     Cranial Nerves   Cranial nerves II through XII intact.     Motor Exam   Muscle bulk: normal  Overall muscle tone: normal    Strength   Strength 5/5 throughout.     Sensory Exam   Light touch normal.     Gait, Coordination, and Reflexes     Coordination   Finger to nose coordination: normal        Results  MMSE=19      Assessment/Plan   There are no diagnoses linked to this encounter.      Discussion/Summary   Ayan Pichardo comes to clinic today with a history of AD. He is doing well generally. On review of his medications it was elected to continue memantine and add donepezil. We may consider stopping Haldol in the future. He will then follow up in 6 months, or sooner if needed.     I spent 25 minutes face to face with the patient and family. I spent 15 minutes counseling and discussing diagnostic testing, current status, treatment options and management.    As part of this visit I reviewed prior lab results and obtained additional history from the family which is incorporated in the HPI.      Abdulkadir Goodson MD

## 2020-04-02 NOTE — TELEPHONE ENCOUNTER
PTS DAUGHTER RICK CALLED IN STATING SHE HAS READ AND FEELS LIKE THE MEDICATION donepezil (ARICEPT) 5 MG tablet BEING GIVEN TO PT AT NIGHT IS NOT HELPING WITH SLEEPING ISSUES LATELY, SHE REQUEST WE ASK [facility] TO START GIVING THAT MEDICATION IN THE MORNING. SHE ALSO REQUESTS THAT WE SEND OVER UPDATED MEDICATION'S TO THEM   -  memantine (NAMENDA) 10 MG tablet  Donepezil (ARICEPT) 5 MG tablet  haloperidol (HALDOL) 0.5 MG tablet  -     JADA DIAZ  FAX -[phone]    RICK   [phone]

## 2020-06-22 NOTE — TELEPHONE ENCOUNTER
Pts daughter (Jan) called asking us to check her dad. She stated her mom called her on Saturday and was concerned about Mr Pichardo. Daughter stated he was so tired he could not get out of bed. Mr Pichardo told his daughter that his heart hurt. He is in assisted living and one of the nursed gave him a nitro according to his daughter. Mr Pichardo stated it 'helped'. After reviewing his latitude transmission he was in a fib with high rates for approximately 20 min. Daughter is adamant about not taking him to the hospital due to his alzheimers. We will continue to monitor him and daughter was agreable with plan.

## 2020-07-15 NOTE — TELEPHONE ENCOUNTER
Received transmission on Mr Pichardo this morning with high rates all day yesterday.150-160. Rates have been running high on and off since mid June per [facility] reports from 7/9/20 and 7/12/20 Pt still in a fib. Spoke with daughter and she states her dad really never has good days only ok days and bad days. She is willing to discuss any changes you wish to make with medication if you think it would help. and Mrs Pichardo are in assisted living and are not very dependable for information per their daughter.If you wish to make any changes please contact Maria C at [phone]

## 2020-07-17 NOTE — TELEPHONE ENCOUNTER
Spoke with Maria C and she is agreeable with plan. She is going to contact someone at the facility where her parents live and ask if they have some where they can do a video visit.. She will call me back and arrange visit and transmission the day before.

## 2020-07-28 NOTE — PROGRESS NOTES
[facility]   OFFICE NOTE      Ayan Pichardo  6/5/1932  PCP: Andrea Lundberg DO    SUBJECTIVE:   Ayan Pichardo is a 88 y.o. male seen for a follow up visit regarding the following:     CC:Afib    HPI:   Pleasant 88-year-old retired  presents to the office today with his daughter from assisted living care facility because of concerns of recent symptoms of feeling lethargic fatigue weakness and recent remote pacemaker interrogations revealing persistent atrial fibrillation with RVR.  The patient has advanced dementia is able to answer some questions but the daughter does fill in some details regarding his recent symptoms.  The daughter reports that there is a lot of mornings when the patient feels very fatigued and weak.  In addition he is not as active as he used to be is unable to walk very far as he is short of breath.  He has had some spells where he goes from a sitting standing position he gets dizzy and lightheaded and nearly falls.  He has not had any regi syncope.  He is not complaining of any chest pain.    Cardiac PMH: (Old records have been reviewed and summarized below)  1. Intermittent CHB  a. 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 Chronic RBBB, LAFB  c. Echocardiogram Normal EF 55%, Mild AS.   2. Persistent  a. PAF Now persistent since 6/2020  b. Afib with RVR episodes of AFib 170's.   c. Chadsvasc=4, Eliquis 5mg BID  3. Marginal BP  4. Dementia         Past Medical History, Past Surgical History, Family history, Social History, and Medications were all reviewed with the patient today and updated as necessary.       Current Outpatient Medications:   •  acetaminophen (TYLENOL) 325 MG tablet, Take 650 mg by mouth Every 4 (Four) Hours As Needed for Mild Pain ., Disp: , Rfl:   •  apixaban (Eliquis) 5 MG tablet tablet, Take 1 tablet by mouth Every 12 (Twelve) Hours., Disp: 180 tablet, Rfl: 3  •  carvedilol (COREG) 12.5 MG tablet, Take 1 tablet  by mouth 2 (Two) Times a Day With Meals., Disp: 180 tablet, Rfl: 2  •  cetirizine (zyrTEC) 10 MG tablet, Take 10 mg by mouth As Needed., Disp: , Rfl:   •  docusate sodium (COLACE) 100 MG capsule, Take 100 mg by mouth As Needed for Constipation., Disp: , Rfl:   •  donepezil (ARICEPT) 5 MG tablet, Take 1 tablet by mouth Daily. (Patient taking differently: Take 10 mg by mouth Daily.), Disp: 30 tablet, Rfl: 11  •  haloperidol (HALDOL) 0.5 MG tablet, As Needed., Disp: , Rfl:   •  ibuprofen (ADVIL,MOTRIN) 400 MG tablet, Take 400 mg by mouth Every 6 (Six) Hours As Needed for Mild Pain ., Disp: , Rfl:   •  memantine (NAMENDA) 10 MG tablet, Take 1 tablet by mouth Every Night. (Patient taking differently: Take 10 mg by mouth 2 (Two) Times a Day.), Disp: , Rfl:   •  nitroglycerin (NITROSTAT) 0.4 MG SL tablet, Place 1 tablet under the tongue Every 5 (Five) Minutes As Needed for Chest Pain. Take no more than 3 doses in 15 minutes., Disp: , Rfl: 12  •  sertraline (ZOLOFT) 100 MG tablet, 100 mg Daily., Disp: , Rfl:       No Known Allergies  Patient Active Problem List   Diagnosis   • Balance problem   • Memory deficit   • BMI 25.0-25.9,adult   • Nonsmoker   • Recurrent malignant melanoma of skin (CMS/HCC)   • PAF not on anticoagulation    • Essential hypertension   • Benign prostatic hyperplasia   • Brain atrophy (CMS/HCC)   • CAD with history of DES 2011   • Chronic constipation   • Dyspnea   • Encounter for screening colonoscopy   • History of penile implant   • Hyperlipidemia LDL goal <70   • Hypoesthesia of skin   • Idiopathic peripheral neuropathy   • Lumbar radiculopathy   • Malaise and fatigue   • Paroxysmal atrial fibrillation (CMS/HCC)   • Systolic heart failure (CMS/HCC)   • Weakness   • 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  Resolving   • 2:1 complete AV block with  associated traumatic bradycardia and near syncope (CMS/HCC)   • Placement of dual-chamber PPM 2019     Past Medical History:   Diagnosis Date   • Abnormal brain CT 2016    Overview:  Dilated lateral and 3rd ventricle. Possibly could be atrophy but cannot completely exclude hydrocephaly.   • Atrial fibrillation (CMS/HCC)    • Back pain    • Brain atrophy (CMS/HCC)    • CAD (coronary artery disease)    • Constipation    • Hyperlipidemia    • Hypersomnia    • Melanoma (CMS/HCC)     SCALP   • Memory deficit    • Neuropathy    • Radiculopathy      Past Surgical History:   Procedure Laterality Date   • AV NODE ABLATION     • CARDIAC ELECTROPHYSIOLOGY PROCEDURE N/A 2019    Procedure: DEVICE IMPLANT;  Surgeon: Leighton Granda DO;  Location:  [facility];  Service: Cardiology   • CATARACT EXTRACTION     • COLONOSCOPY      <5years, clear   • CORONARY ANGIOPLASTY WITH STENT PLACEMENT      [place]   • PACEMAKER IMPLANTATION     • PENILE PROSTHESIS IMPLANT     • SCALP/NECK TISSUE EXPANDER INSERTION N/A 2018    Procedure: Procedure performed:     1) Excision malignant neoplasm of skin of scalp, 6 cm x 5.5 cm, subfascial    2) full-thickness skin graft closure of 6 cm x 5.5 cm    3) left selective neck dissection (level IIA and IIB)    4) right sentinel lymph node biopsy.    5) complex closure skin of right neck, 10 cm ;  Surgeon: Eric Silva MD;  Location: [facility] OR;  Service: ENT     Family History   Problem Relation Age of Onset   • Cancer Mother    • Hypertension Mother      Social History     Tobacco Use   • Smoking status: Former Smoker     Types: Cigarettes     Last attempt to quit: 1980     Years since quittin.2   • Smokeless tobacco: Never Used   Substance Use Topics   • Alcohol use: No       ROS:  Review of Symptoms:  General: +fatigue  Skin: no rashes, lumps, or other skin changes  HEENT: no dizziness, lightheadedness, or vision changes  Respiratory: no cough or  "hemoptysis  Cardiovascular: no palpitations, + tachycardia  Gastrointestinal: no black/tarry stools or diarrhea  Urinary: no change in frequency or urgency  Peripheral Vascular: no claudication or leg cramps  Musculoskeletal: no muscle or joint pain/stiffness  Psychiatric: no depression or excessive stress  Neurological: + Dementia  Hematologic: no anemia, easy bruising or bleeding  Endocrine: no thyroid problems, nor heat or cold intolerance    PHYSICAL EXAM:    /64 (BP Location: Right arm, Patient Position: Sitting)   Pulse 96   Temp 97.7 °F (36.5 °C)   Ht 188 cm (74\")   Wt 86.5 kg (190 lb 12.8 oz)   SpO2 96%   BMI 24.50 kg/m²        Wt Readings from Last 5 Encounters:   07/28/20 86.5 kg (190 lb 12.8 oz)   05/22/20 83.9 kg (185 lb)   02/19/20 88.5 kg (195 lb)   12/10/19 86.2 kg (190 lb)   11/22/19 89.1 kg (196 lb 6.4 oz)       BP Readings from Last 5 Encounters:   07/28/20 110/64   05/22/20 130/71   12/11/19 (!) 155/104   11/22/19 136/86   09/24/19 160/67       General appearance -Mild Dementia   Mental status - Affect appropriate to mood.  Eyes - Sclerae anicteric,  ENMT - Hearing grossly normal bilaterally, Dental hygiene good.  Neck - Carotids upstroke normal bilaterally, no bruits, no JVD.  Resp - Clear to auscultation, no wheezes, rales or rhonchi, symmetric air entry.  Heart -IRIR, normal S1, S2, no murmurs, rubs, clicks or gallops.  GI - Soft, nontender, nondistended, no masses or organomegaly.  Neurological - Grossly intact - normal speech, no focal findings  Musculoskeletal - No joint tenderness, deformity or swelling, no muscular tenderness noted.  Extremities - Peripheral pulses normal, trace pedal edema, no clubbing or cyanosis.  Skin - Normal coloration and turgor.  Psych -  oriented to person, not to place or time.     Device Interrogation:  Please see device interrogation form which has been signed and updated for full details.  [employer] dual-chamber pacemaker a paced 70% RV " paced 96%.  Patient is been persistent atrial fibrillation since June with a total burden of A. fib 21%.  He has had episodes of RVR with average heart rate 140s sometimes as fast as 170.    ASSESSMENT   1. Persistent AFib: Persistent AFib since June with some episodes of RVR, average  rates 140's with some 's.   2. CHB, BSC PPM: Normal function.   3. Normal EF by Echocardiogram. 12/19    PLAN  · Long discussion with the patient as well as the daughter today regarding his persistent atrial fibrillation with recent RVR.  Appears he is having more symptoms with atrial fibrillation and the family is concerned this is led him to be more lethargic fatigued and potentially near syncope with high heart rates.    He will stop carvedilol initiate metoprolol 50 mg p.o. twice daily.  In addition we discussed options for further treatment A. fib such as antiarrhythmic medications versus considering AV node ablation the family are adamant that he is not a good candidate for further invasive procedures therefore they would like to consider amiodarone as an option.  We did discuss the risk and benefits of this medication.  He will initiate amiodarone 200 mg p.o. twice daily for 5 days and then daily.  He will baseline laboratory data today.  · Return for follow-up in 1 month or sooner as needed continue  vital signs monitoring at the [facility] care facility twice a week.    7/28/2020  11:42    Will Jose BRADFORD

## 2020-07-29 NOTE — TELEPHONE ENCOUNTER
Tyler with  PT called to clarify an order. He said that the paperwork her received from [facility] states the patient is supposed to hold PT until HR is under control. They would like to know if you also want to hold OT? I am to call him back at [phone].

## 2020-08-17 NOTE — ED PROVIDER NOTES
Subjective   This is a pleasant 88-year-old male who has  dementia but can generally feed himself and ambulate with the help of a walker.  He is at the [facility].  He is accompanied by his daughter today.    He is sent from his abode today for a fall.  This is 1 of many falls for the patient he fell about 4 times in July.  He saw Raman Murry in the cardiology clinic is thought that his A. fib might be a contributor and so he was started on amiodarone and switch from car Levatol to metoprolol.  Since that time he has had less frequent falls.  Somehow the day got wedged between the toilet and the wall initially was complaining of hip pain.    Getting history from the patient is difficult as he has no memory of the fall and has fairly advanced dementia.  He is on chronic Eliquis therapy as well.    Currently the patient has no complaints.  The daughter reports no medication changes except what was noted above.  He has had no recent illness.  He did have some diarrhea a week or 2 ago but that since resolved.        I really could not get any other history or review of systems from the patient.          Review of Systems   Unable to perform ROS: Dementia       Past Medical History:   Diagnosis Date   • Abnormal brain CT 6/2/2016    Overview:  Dilated lateral and 3rd ventricle. Possibly could be atrophy but cannot completely exclude hydrocephaly.   • Atrial fibrillation (CMS/HCC)    • Back pain    • Brain atrophy (CMS/HCC)    • CAD (coronary artery disease)    • Constipation    • Hyperlipidemia    • Hypersomnia    • Melanoma (CMS/HCC)     SCALP   • Memory deficit    • Neuropathy    • Radiculopathy    [facility]   OFFICE NOTE      Raman cathy, July 2020 note  Ayan Pichardo  6/5/1932  PCP: Andrea Lundberg DO     SUBJECTIVE:   Ayan Pichardo is a 88 y.o. male seen for a follow up visit regarding the following:      CC:Afib     HPI:   Pleasant 88-year-old retired   presents to the office today with his daughter from assisted living care facility because of concerns of recent symptoms of feeling lethargic fatigue weakness and recent remote pacemaker interrogations revealing persistent atrial fibrillation with RVR.  The patient has advanced dementia is able to answer some questions but the daughter does fill in some details regarding his recent symptoms.  The daughter reports that there is a lot of mornings when the patient feels very fatigued and weak.  In addition he is not as active as he used to be is unable to walk very far as he is short of breath.  He has had some spells where he goes from a sitting standing position he gets dizzy and lightheaded and nearly falls.  He has not had any regi syncope.  He is not complaining of any chest pain.     Cardiac PMH: (Old records have been reviewed and summarized below)  1. Intermittent CHB  a. 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 Chronic RBBB, LAFB  c. Echocardiogram Normal EF 55%, Mild AS.   2. Persistent  a. PAF Now persistent since 6/2020  b. Afib with RVR episodes of AFib 170's.   c. Chadsvasc=4, Eliquis 5mg BID  3. Marginal BP  Dementia   Device Interrogation:  Please see device interrogation form which has been signed and updated for full details.  [employer] dual-chamber pacemaker a paced 70% RV paced 96%.  Patient is been persistent atrial fibrillation since June with a total burden of A. fib 21%.  He has had episodes of RVR with average heart rate 140s sometimes as fast as 170.     ASSESSMENT   1. Persistent AFib: Persistent AFib since June with some episodes of RVR, average  rates 140's with some 's.   2. CHB, BSC PPM: Normal function.   3. Normal EF by Echocardiogram. 12/19     PLAN  · Long discussion with the patient as well as the daughter today regarding his persistent atrial fibrillation with recent RVR.  Appears he is having more symptoms with atrial fibrillation and the family is  concerned this is led him to be more lethargic fatigued and potentially near syncope with high heart rates.    He will stop carvedilol initiate metoprolol 50 mg p.o. twice daily.  In addition we discussed options for further treatment A. fib such as antiarrhythmic medications versus considering AV node ablation the family are adamant that he is not a good candidate for further invasive procedures therefore they would like to consider amiodarone as an option.  We did discuss the risk and benefits of this medication.  He will initiate amiodarone 200 mg p.o. twice daily for 5 days and then daily.  He will baseline laboratory data today.  · Return for follow-up in 1 month or sooner as needed continue  vital signs monitoring at the [facility] care facility twice a week.     7/28/2020  11:42     Will Jose BRADFORD  No Known Allergies    Past Surgical History:   Procedure Laterality Date   • AV NODE ABLATION     • CARDIAC ELECTROPHYSIOLOGY PROCEDURE N/A 12/6/2019    Procedure: DEVICE IMPLANT;  Surgeon: Leighton Granda DO;  Location: [facility] INVASIVE LOCATION;  Service: Cardiology   • CATARACT EXTRACTION     • COLONOSCOPY      <5years, clear   • CORONARY ANGIOPLASTY WITH STENT PLACEMENT      paduca   • PACEMAKER IMPLANTATION     • PENILE PROSTHESIS IMPLANT     • SCALP/NECK TISSUE EXPANDER INSERTION N/A 9/11/2018    Procedure: Procedure performed:     1) Excision malignant neoplasm of skin of scalp, 6 cm x 5.5 cm, subfascial    2) full-thickness skin graft closure of 6 cm x 5.5 cm    3) left selective neck dissection (level IIA and IIB)    4) right sentinel lymph node biopsy.    5) complex closure skin of right neck, 10 cm ;  Surgeon: Eric Silva MD;  Location: [facility] OR;  Service: ENT       Family History   Problem Relation Age of Onset   • Cancer Mother    • Hypertension Mother        Social History     Socioeconomic History   • Marital status:      Spouse name: Not on file   • Number of children: Not on file    • Years of education: Not on file   • Highest education level: Not on file   Tobacco Use   • Smoking status: Former Smoker     Types: Cigarettes     Last attempt to quit: 1980     Years since quittin.2   • Smokeless tobacco: Never Used   Substance and Sexual Activity   • Alcohol use: No   • Drug use: No   • Sexual activity: Defer           Objective   Physical Exam   Constitutional:   This is an elderly man in no acute distress.  He is alert recognizes his daughter but really cannot participate much in history he said he is not in pain currently.   HENT:   Head: Normocephalic and atraumatic.   Right Ear: External ear normal.   Left Ear: External ear normal.   Nose: Nose normal.   Mouth/Throat: Oropharynx is clear and moist.   Eyes: Pupils are equal, round, and reactive to light. Conjunctivae and EOM are normal.   Neck: Normal range of motion. Neck supple. No JVD present. No tracheal deviation present. No thyromegaly present.   Cervical, thoracic, and lumbosacral spine are nontender.   Cardiovascular: Intact distal pulses.   No murmur heard.  His heart is rapid about 135 beats a minute with slight irregularity but there is no murmurs rubs gallops or heaves.  His chest is nontender.  There is a healing sore on his left shoulder.   Pulmonary/Chest: Effort normal and breath sounds normal.   Chest is nontender and back is nontender to palpation no crepitance of the rib areas.   Abdominal: Soft. Bowel sounds are normal. He exhibits no distension. There is no tenderness. There is no guarding.   Musculoskeletal: Normal range of motion.   He moves his arms and legs fairly well.  He has slight tenderness to palpation of the right hip area.  There is no obvious bruising currently.  His good pulses without edema or synovitis or rash.   Neurological: He is alert.   He is alert but disoriented moderately advanced dementia about at his baseline per his daughter.  His face is symmetric his voice is soft and his tongue  is midline.  Vision, hearing, and speech are preserved.  Is modest generalized weakness without focality currently.   Skin: Skin is warm and dry. Capillary refill takes less than 2 seconds.   Nursing note and vitals reviewed.      Procedures           ED Course      Recent Results (from the past 24 hour(s))   Comprehensive Metabolic Panel    Collection Time: 08/17/20 12:07 PM   Result Value Ref Range    Glucose 141 (H) 65 - 99 mg/dL    BUN 21 8 - 23 mg/dL    Creatinine 1.28 (H) 0.76 - 1.27 mg/dL    Sodium 140 136 - 145 mmol/L    Potassium 3.7 3.5 - 5.2 mmol/L    Chloride 102 98 - 107 mmol/L    CO2 28.0 22.0 - 29.0 mmol/L    Calcium 9.6 8.6 - 10.5 mg/dL    Total Protein 7.3 6.0 - 8.5 g/dL    Albumin 3.80 3.50 - 5.20 g/dL    ALT (SGPT) 17 1 - 41 U/L    AST (SGOT) 23 1 - 40 U/L    Alkaline Phosphatase 90 39 - 117 U/L    Total Bilirubin 0.9 0.0 - 1.2 mg/dL    eGFR Non African Amer 53 (L) >60 mL/min/1.73    Globulin 3.5 gm/dL    A/G Ratio 1.1 g/dL    BUN/Creatinine Ratio 16.4 7.0 - 25.0    Anion Gap 10.0 5.0 - 15.0 mmol/L   Magnesium    Collection Time: 08/17/20 12:07 PM   Result Value Ref Range    Magnesium 1.9 1.6 - 2.4 mg/dL   TSH    Collection Time: 08/17/20 12:07 PM   Result Value Ref Range    TSH 2.510 0.270 - 4.200 uIU/mL   CBC Auto Differential    Collection Time: 08/17/20 12:07 PM   Result Value Ref Range    WBC 18.78 (H) 3.40 - 10.80 10*3/mm3    RBC 4.94 4.14 - 5.80 10*6/mm3    Hemoglobin 15.6 13.0 - 17.7 g/dL    Hematocrit 49.0 37.5 - 51.0 %    MCV 99.2 (H) 79.0 - 97.0 fL    MCH 31.6 26.6 - 33.0 pg    MCHC 31.8 31.5 - 35.7 g/dL    RDW 13.5 12.3 - 15.4 %    RDW-SD 49.1 37.0 - 54.0 fl    MPV 9.9 6.0 - 12.0 fL    Platelets 137 (L) 140 - 450 10*3/mm3   Manual Differential    Collection Time: 08/17/20 12:07 PM   Result Value Ref Range    Neutrophil % 80.0 (H) 42.7 - 76.0 %    Lymphocyte % 4.0 (L) 19.6 - 45.3 %    Monocyte % 7.0 5.0 - 12.0 %    Eosinophil % 0.0 (L) 0.3 - 6.2 %    Basophil % 0.0 0.0 - 1.5 %    Bands  %  9.0 (H) 0.0 - 5.0 %    Neutrophils Absolute 16.71 (H) 1.70 - 7.00 10*3/mm3    Lymphocytes Absolute 0.75 0.70 - 3.10 10*3/mm3    Monocytes Absolute 1.31 (H) 0.10 - 0.90 10*3/mm3    Eosinophils Absolute 0.00 0.00 - 0.40 10*3/mm3    Basophils Absolute 0.00 0.00 - 0.20 10*3/mm3    RBC Morphology Normal Normal    WBC Morphology Normal Normal    Platelet Morphology Normal Normal   Urinalysis With Microscopic If Indicated (No Culture) - Urine, Clean Catch    Collection Time: 08/17/20  1:35 PM   Result Value Ref Range    Color, UA Yellow Yellow, Straw    Appearance, UA Cloudy (A) Clear    pH, UA <=5.0 5.0 - 8.0    Specific Gravity, UA 1.020 1.001 - 1.030    Glucose, UA Negative Negative    Ketones, UA Negative Negative    Bilirubin, UA Negative Negative    Blood, UA Large (3+) (A) Negative    Protein, UA 30 mg/dL (1+) (A) Negative    Leuk Esterase, UA Moderate (2+) (A) Negative    Nitrite, UA Positive (A) Negative    Urobilinogen, UA 1.0 E.U./dL 0.2 - 1.0 E.U./dL   Urinalysis, Microscopic Only - Urine, Clean Catch    Collection Time: 08/17/20  1:35 PM   Result Value Ref Range    RBC, UA Too Numerous to Count (A) None Seen, 0-2 /HPF    WBC, UA 13-20 (A) None Seen, 0-2 /HPF    Bacteria, UA None Seen None Seen, Trace /HPF    Squamous Epithelial Cells, UA None Seen None Seen, 0-2 /HPF    Hyaline Casts, UA None Seen 0 - 6 /LPF    Methodology Manual Light Microscopy      Note: In addition to lab results from this visit, the labs listed above may include labs taken at another facility or during a different encounter within the last 24 hours. Please correlate lab times with ED admission and discharge times for further clarification of the services performed during this visit.    CT Head Without Contrast   Final Result   No acute intracranial abnormality specifically, no acute   intracranial hemorrhage with senescent changes similar to prior.   Progressed fluid signal within the left maxillary sinus concerning for   sinusitis  and/or mucus retention cyst involvement of opacification.       D:  08/17/2020   E:  08/17/2020               This report was finalized on 8/17/2020 5:06 PM by Dr. Pako Henson.          CT Pelvis Without Contrast   Final Result   No displaced pelvic ring fracture with degenerative changes   of the bilateral hips which appear well located at the hip joints.       D:  08/17/2020   E:  08/17/2020               This report was finalized on 8/17/2020 5:06 PM by Dr. Pako Henson.          XR Chest 1 View   Final Result   No acute traumatic findings of the chest.       D:  08/17/2020   E:  08/17/2020       This report was finalized on 8/17/2020 5:06 PM by Dr. Pako Henson.            Vitals:    08/17/20 1430 08/17/20 1436 08/17/20 1454 08/17/20 1500   BP: 131/95  131/95 120/88   BP Location:   Left arm    Patient Position:   Lying    Pulse:  (!) 126 (!) 126 (!) 125   Resp:   20    Temp:       TempSrc:       SpO2:  96% 93% 94%   Weight:       Height:         Medications   metoprolol tartrate (LOPRESSOR) injection 2.5 mg (2.5 mg Intravenous Given 8/17/20 1213)   cefTRIAXone (ROCEPHIN) 1 g/100 mL 0.9% NS (MBP) (0 g Intravenous Stopped 8/17/20 1502)     ECG/EMG Results (last 24 hours)     ** No results found for the last 24 hours. **        ECG 12 Lead    (Results Pending)                                          MDM  Number of Diagnoses or Management Options  Chronic maxillary sinusitis:   Dementia without behavioral disturbance, unspecified dementia type (CMS/[facility]):   Fall, initial encounter:   Leukocytosis, unspecified type:   Pelvic pain:   Rapid heart rate:   Urinary tract infection with hematuria, site unspecified:   Diagnosis management comments:       I reviewed all available studies at the bedside with the patient he has multiple abnormalities including persistent tachycardia may represent a flutter or inappropriate sinus tachycardia.  Is been refractory to low-dose IV beta-blocker here.  He also has a leukocytosis  and evidence of chronic sinus infection which her daughter said he has had in the past he also has a urinary tract infection.    I spoke to them at length about admission to the hospital but the daughter thinks the patient would not tolerate this very well.  I do not think it is unreasonable to try to treat him as an outpatient.  He recently started amiodarone and beta-blockade for his tachycardia so we can have him continue that follow-up with cardiology.  Discussed the fact that his heart rate being elevated weight is long-term would be detrimental to him and the family understands this but again do not want him admitted to the hospital.  We will try outpatient treatment and I will refer him back to cardiology for recheck in his primary care to follow-up on his UTI.  He will return to the ER if worse in any way.    All are agreeable with the plan       Amount and/or Complexity of Data Reviewed  Clinical lab tests: reviewed  Tests in the radiology section of CPT®: reviewed        Final diagnoses:   Fall, initial encounter   Pelvic pain   Chronic maxillary sinusitis   Urinary tract infection with hematuria, site unspecified   Dementia without behavioral disturbance, unspecified dementia type (CMS/[facility])   Leukocytosis, unspecified type   Rapid heart rate            Grady Hand MD  08/17/20 9330

## 2020-09-29 NOTE — TELEPHONE ENCOUNTER
These medications can certainly be discontinued. Oftentimes, hospice will oversee the tapering schedule. The donepezil can simply be stopped. They can cut the memantine and sertraline in half for a few days and then discontinue if they would like. So sorry to hear this. Thanks.

## 2020-09-29 NOTE — TELEPHONE ENCOUNTER
"Spoke with Chrissy (RENZO), she states he is now in hospice. He is terminal. He sleeps most of the time, he's not eating enough, he says he \"wants to die\". Mementine, Setraline, and Aricept are the three medications he is taking and Chrissy wants to know if they can stop those if possible? If so, how do they need to safely stop those?  "

## 2020-09-29 NOTE — TELEPHONE ENCOUNTER
Received a call from Maria C Leblanc who is patients daughter and she has some questions regarding her fathers medication that she would like to discuss with someone.     Patient is now in hospice and has been detracting badly and they are questioning If they should stop some medications.       Can someone please contact her and discuss this with her?     [phone]

## 2020-12-10 NOTE — TELEPHONE ENCOUNTER
"Patient's daughter called to let you know that the patient has been experiencing intermittent bleeding from his penis. He has a penile implant and she states that \" he gets confused and pulls on it.\" Patient has advanced dementia and is currently in Hospice care. She is unsure of the amount of blood loss, but she states that his vital signs have been stable. She would like to know if it would be ok for him to hold Eliquis periodically for episodes of bleeding or what your recommendation would be?  "

## 2020-12-29 ENCOUNTER — TELEPHONE (OUTPATIENT)
Dept: CARDIOLOGY | Facility: CLINIC | Age: 85
End: 2020-12-29

## (undated) DEVICE — SUT SILK 0 SUTUPAK TIES 24IN SA76G

## (undated) DEVICE — CAUTERY TIP POLISHER: Brand: DEVON

## (undated) DEVICE — SUT SILK 3/0 SUTUPAK TIES 24IN SA74H

## (undated) DEVICE — PREP SOL POVIDONE/IODINE BT 4OZ

## (undated) DEVICE — LEX ELECTRO PHYSIOLOGY: Brand: MEDLINE INDUSTRIES, INC.

## (undated) DEVICE — MARKR SKIN W/RULR AND LBL

## (undated) DEVICE — SUT SILK 2/0 SUTUPAK TIES 24IN SA75H

## (undated) DEVICE — DECANT BG O JET

## (undated) DEVICE — SET PRIMARY GRVTY 10DP MALE LL 104IN

## (undated) DEVICE — NDL HYPO PRECISIONGLIDE REG 25G 1 1/2

## (undated) DEVICE — GAUZE,SPONGE,4"X4",16PLY,XRAY,STRL,LF: Brand: MEDLINE

## (undated) DEVICE — ST EXT IV SMARTSITE 2VLV SP M LL 5ML IV1

## (undated) DEVICE — TUBING, SUCTION, 1/4" X 10', STRAIGHT: Brand: MEDLINE

## (undated) DEVICE — ADULT, W/LG. BACK PAD, RADIOTRANSPARENT ELEMENT AND LEAD WIRE: Brand: DEFIBRILLATION ELECTRODES

## (undated) DEVICE — ELECTRD NDL EDGE/INSUL/PFTE.787MM 2.84IN

## (undated) DEVICE — SUT VIC 4/0 PC3 18IN UD VCP845G

## (undated) DEVICE — CANN NASL CO2 DIVIDED A/

## (undated) DEVICE — SUT ETHLN 4/0 P3 18IN 699H

## (undated) DEVICE — INTRO TEAR AWAY/LVD W/SD PRT 6F 13CM

## (undated) DEVICE — ADHS SKIN DERMABOND TOP ADVANCED

## (undated) DEVICE — SUT SILK 2/0 SH 30IN K833H

## (undated) DEVICE — MEDI-VAC YANKAUER SUCTION HANDLE W/BULBOUS TIP: Brand: CARDINAL HEALTH

## (undated) DEVICE — SUT SILK 4/0 RB1CR8 18IN C054D

## (undated) DEVICE — ANTIBACTERIAL UNDYED BRAIDED (POLYGLACTIN 910), SYNTHETIC ABSORBABLE SUTURE: Brand: COATED VICRYL

## (undated) DEVICE — CONTAINER,SPECIMEN,OR STERILE,4OZ: Brand: MEDLINE

## (undated) DEVICE — PENCL E/S HNDSWCH ROCKRBTN HOLSTR 10FT

## (undated) DEVICE — PK TURNOVER RM ADV

## (undated) DEVICE — Device

## (undated) DEVICE — SUT GUT PLN FAST ABS 5/0 PC1 18IN 1915G

## (undated) DEVICE — SPNG GZ WOVN 4X4IN 12PLY 10/BX STRL

## (undated) DEVICE — DISPOSABLE BIPOLAR CABLE 12FT. (3.6M): Brand: KIRWAN

## (undated) DEVICE — DRSNG TELFA PAD NONADH STR 1S 3X8IN

## (undated) DEVICE — DRN PENRS 1/4X18IN LTX

## (undated) DEVICE — SUT SILK 2/0 FS BLK 18IN 685G

## (undated) DEVICE — SHEET,DRAPE,53X77,STERILE: Brand: MEDLINE

## (undated) DEVICE — SOL NACL 0.9PCT 1000ML

## (undated) DEVICE — TRY PREP SCRB VAG PVP

## (undated) DEVICE — DRSNG SURESITE123 4X4.8IN

## (undated) DEVICE — RESERVOIR,SUCTION,100CC,SILICONE: Brand: MEDLINE

## (undated) DEVICE — SUT SILK 4/0 SUTUPAK TIES 24IN SA73H

## (undated) DEVICE — LIMB HOLDER, WRIST/ANKLE: Brand: DEROYAL

## (undated) DEVICE — ELECTRD BLD EDGE/INSUL1P SFTY SLV 2.75IN

## (undated) DEVICE — CVR PROB GEN PURP W ISOSILK 6X48

## (undated) DEVICE — ST INF PRI SMRTSTE 20DRP 2VLV 24ML 117

## (undated) DEVICE — UTILITY MARKER W/MED LABELS: Brand: MEDLINE

## (undated) DEVICE — GLV SURG BIOGEL LTX PF 8

## (undated) DEVICE — PROXIMATE RH ROTATING HEAD SKIN STAPLERS (35 REGULAR) CONTAINS 35 STAINLESS STEEL STAPLES: Brand: PROXIMATE

## (undated) DEVICE — ADHS LIQ MASTISOL 2/3ML

## (undated) DEVICE — PK ENT HD AND NK 30

## (undated) DEVICE — 3M™ STERI-STRIP™ REINFORCED ADHESIVE SKIN CLOSURES, R1547, 1/2 IN X 4 IN (12 MM X 100 MM), 6 STRIPS/ENVELOPE: Brand: 3M™ STERI-STRIP™